# Patient Record
Sex: MALE | Race: WHITE | NOT HISPANIC OR LATINO
[De-identification: names, ages, dates, MRNs, and addresses within clinical notes are randomized per-mention and may not be internally consistent; named-entity substitution may affect disease eponyms.]

---

## 2017-01-04 ENCOUNTER — APPOINTMENT (OUTPATIENT)
Dept: UROLOGY | Facility: CLINIC | Age: 67
End: 2017-01-04

## 2017-01-04 ENCOUNTER — APPOINTMENT (OUTPATIENT)
Dept: ULTRASOUND IMAGING | Facility: IMAGING CENTER | Age: 67
End: 2017-01-04

## 2017-02-01 ENCOUNTER — APPOINTMENT (OUTPATIENT)
Dept: UROLOGY | Facility: CLINIC | Age: 67
End: 2017-02-01

## 2017-02-01 DIAGNOSIS — N20.0 CALCULUS OF KIDNEY: ICD-10-CM

## 2017-02-01 DIAGNOSIS — N40.1 BENIGN PROSTATIC HYPERPLASIA WITH LOWER URINARY TRACT SYMPMS: ICD-10-CM

## 2017-02-01 DIAGNOSIS — R33.9 RETENTION OF URINE, UNSPECIFIED: ICD-10-CM

## 2017-02-01 RX ORDER — SILDENAFIL 20 MG/1
20 TABLET ORAL 3 TIMES DAILY
Qty: 60 | Refills: 5 | Status: ACTIVE | COMMUNITY
Start: 2017-02-01 | End: 1900-01-01

## 2017-02-01 RX ORDER — FINASTERIDE 5 MG/1
5 TABLET, FILM COATED ORAL DAILY
Qty: 90 | Refills: 3 | Status: ACTIVE | COMMUNITY
Start: 2017-02-01 | End: 1900-01-01

## 2017-08-02 ENCOUNTER — APPOINTMENT (OUTPATIENT)
Dept: UROLOGY | Facility: CLINIC | Age: 67
End: 2017-08-02

## 2018-01-03 ENCOUNTER — RX RENEWAL (OUTPATIENT)
Age: 68
End: 2018-01-03

## 2018-01-03 RX ORDER — ALFUZOSIN HYDROCHLORIDE 10 MG/1
10 TABLET, EXTENDED RELEASE ORAL
Qty: 30 | Refills: 6 | Status: ACTIVE | COMMUNITY
Start: 2017-02-01 | End: 1900-01-01

## 2019-12-24 PROBLEM — E78.01 FAMILIAL HYPERCHOLESTEROLEMIA: Status: ACTIVE | Noted: 2019-12-24

## 2019-12-24 PROBLEM — N40.0 PROSTATIC HYPERTROPHY: Status: ACTIVE | Noted: 2019-12-24

## 2019-12-24 PROBLEM — Z76.89 ESTABLISHING CARE WITH NEW DOCTOR, ENCOUNTER FOR: Status: ACTIVE | Noted: 2019-12-24

## 2019-12-24 PROBLEM — J34.3 HYPERTROPHY OF NASAL TURBINATES: Status: ACTIVE | Noted: 2019-06-11

## 2019-12-24 PROBLEM — J34.2 DEVIATED NASAL SEPTUM: Status: ACTIVE | Noted: 2019-06-11

## 2019-12-24 PROBLEM — J32.9 CHRONIC SINUSITIS, UNSPECIFIED: Status: ACTIVE | Noted: 2019-06-11

## 2020-01-10 ENCOUNTER — OFFICE VISIT (OUTPATIENT)
Dept: FAMILY MEDICINE CLINIC | Facility: CLINIC | Age: 70
End: 2020-01-10
Payer: COMMERCIAL

## 2020-01-10 VITALS
DIASTOLIC BLOOD PRESSURE: 58 MMHG | OXYGEN SATURATION: 97 % | RESPIRATION RATE: 14 BRPM | WEIGHT: 158.6 LBS | BODY MASS INDEX: 25.49 KG/M2 | TEMPERATURE: 98.2 F | HEIGHT: 66 IN | SYSTOLIC BLOOD PRESSURE: 100 MMHG | HEART RATE: 89 BPM

## 2020-01-10 DIAGNOSIS — Z76.89 ESTABLISHING CARE WITH NEW DOCTOR, ENCOUNTER FOR: Primary | ICD-10-CM

## 2020-01-10 DIAGNOSIS — R53.83 FATIGUE, UNSPECIFIED TYPE: ICD-10-CM

## 2020-01-10 DIAGNOSIS — J31.0 CHRONIC RHINITIS: ICD-10-CM

## 2020-01-10 DIAGNOSIS — G47.33 OBSTRUCTIVE SLEEP APNEA SYNDROME: ICD-10-CM

## 2020-01-10 DIAGNOSIS — I71.2 THORACIC AORTIC ANEURYSM WITHOUT RUPTURE (HCC): ICD-10-CM

## 2020-01-10 DIAGNOSIS — E78.01 FAMILIAL HYPERCHOLESTEROLEMIA: ICD-10-CM

## 2020-01-10 DIAGNOSIS — F34.1 DYSTHYMIA: ICD-10-CM

## 2020-01-10 DIAGNOSIS — Z12.5 ENCOUNTER FOR PROSTATE CANCER SCREENING: ICD-10-CM

## 2020-01-10 DIAGNOSIS — M15.9 PRIMARY OSTEOARTHRITIS INVOLVING MULTIPLE JOINTS: ICD-10-CM

## 2020-01-10 PROBLEM — T84.498A FAILED ORTHOPEDIC IMPLANT (HCC): Status: ACTIVE | Noted: 2017-11-22

## 2020-01-10 PROBLEM — I71.20 THORACIC AORTIC ANEURYSM WITHOUT RUPTURE: Status: ACTIVE | Noted: 2020-01-10

## 2020-01-10 PROCEDURE — 99203 OFFICE O/P NEW LOW 30 MIN: CPT | Performed by: FAMILY MEDICINE

## 2020-01-10 PROCEDURE — 36415 COLL VENOUS BLD VENIPUNCTURE: CPT | Performed by: FAMILY MEDICINE

## 2020-01-10 PROCEDURE — 3008F BODY MASS INDEX DOCD: CPT | Performed by: FAMILY MEDICINE

## 2020-01-10 PROCEDURE — 1160F RVW MEDS BY RX/DR IN RCRD: CPT | Performed by: FAMILY MEDICINE

## 2020-01-10 RX ORDER — COVID-19 ANTIGEN TEST
KIT MISCELLANEOUS AS NEEDED
COMMUNITY

## 2020-01-10 RX ORDER — SILDENAFIL CITRATE 20 MG/1
20 TABLET ORAL AS NEEDED
COMMUNITY
Start: 2016-06-17 | End: 2021-08-03

## 2020-01-10 RX ORDER — HYDROXYZINE HYDROCHLORIDE 10 MG/1
10 TABLET, FILM COATED ORAL DAILY PRN
COMMUNITY

## 2020-01-10 RX ORDER — PYRIDOXINE HCL (VITAMIN B6) 50 MG
2000 TABLET ORAL WEEKLY
COMMUNITY

## 2020-01-10 RX ORDER — CELECOXIB 200 MG/1
CAPSULE ORAL
Refills: 0 | COMMUNITY
Start: 2019-12-10 | End: 2021-08-03

## 2020-01-10 RX ORDER — HYDROCODONE BITARTRATE AND ACETAMINOPHEN 5; 300 MG/1; MG/1
TABLET ORAL
Refills: 0 | COMMUNITY
Start: 2019-10-11 | End: 2021-08-03 | Stop reason: SDUPTHER

## 2020-01-10 RX ORDER — TRAMADOL HYDROCHLORIDE 50 MG/1
TABLET ORAL
Refills: 0 | COMMUNITY
Start: 2019-10-11 | End: 2022-02-16 | Stop reason: SDUPTHER

## 2020-01-10 NOTE — PROGRESS NOTES
BMI Counseling: Body mass index is 25 6 kg/m²  The BMI is above normal  Nutrition recommendations include encouraging healthy choices of fruits and vegetables and moderation in carbohydrate intake  Exercise recommendations include exercising 3-5 times per week  No pharmacotherapy was ordered  Assessment/Plan:    No problem-specific Assessment & Plan notes found for this encounter  Diagnoses and all orders for this visit:    Establishing care with new doctor, encounter for    Familial hypercholesterolemia  -     Comprehensive metabolic panel  -     Lipid panel    Primary osteoarthritis involving multiple joints    Obstructive sleep apnea syndrome  -     CBC and differential    Chronic rhinitis    Thoracic aortic aneurysm without rupture (HCC)    Fatigue, unspecified type  -     TSH, 3rd generation  -     CBC and differential  -     Comprehensive metabolic panel  -     Lipid panel  -     PSA Total (Reflex To Free)  -     Sedimentation rate, automated  -     C-reactive protein  -     Lyme disease, western blot    Encounter for prostate cancer screening  -     PSA Total (Reflex To Free)    Dysthymia    Other orders  -     traMADol (ULTRAM) 50 mg tablet; TK 1 T PO UP TO TID QD PRN  -     celecoxib (CeleBREX) 200 mg capsule; TK 1 C PO QD PRN  -     sildenafil (REVATIO) 20 mg tablet; Take 20 mg by mouth  -     hydrOXYzine HCL (ATARAX) 25 mg tablet; Take 25 mg by mouth  -     HYDROcodone-acetaminophen (XODOL) 5-300 MG per tablet; TK 1 T PO Q 6 H PRN  -     Naproxen Sodium (ALEVE) 220 MG CAPS; Take by mouth  -     vitamin B-12 (CYANOCOBALAMIN) 100 MCG TABS; Take 1,000 mcg by mouth daily  -     IRON, FERROUS SULFATE, PO; Take by mouth daily  -     Pseudoephedrine-guaiFENesin (MUCINEX D PO); Take by mouth as needed  -     Pseudoephedrine HCl (SUDAFED PO);  Take by mouth as needed          Patient Instructions   CONTINUE CURRENT TREATMENT PLAN AND MEDICATIONS  BW WILL BE OBTAINED    WILL SCHEDULE A CPX IN THE NEAR FUTURE    FURTHER PLANS PENDING EVALUATION        Return in about 4 weeks (around 2/7/2020) for Annual physical     Subjective:      Patient ID: Austin Winston is a 68 y o  male  Chief Complaint   Patient presents with   [facility]       PATIENT IS A NEW PATIENT TO THE PRACTICE    REVIEWED EXTENSIVE PAST MEDICAL HISTORY AND MEDICATIONS  DISCUSSED HEALTH MAINTENANCE ISSUES    PATIENT HAS BEEN UNDER A LOT OF STRESS  REVIEWED THERAPEUTIC OPTIONS      The following portions of the patient's history were reviewed and updated as appropriate: allergies, current medications, past family history, past medical history, past social history, past surgical history and problem list     Review of Systems   Constitutional: Positive for fatigue  Negative for chills and fever  HENT: Negative for congestion, ear discharge, ear pain, mouth sores, postnasal drip, sore throat and trouble swallowing  Eyes: Negative for pain, discharge and visual disturbance  Respiratory: Negative for cough, shortness of breath and wheezing  Cardiovascular: Negative for chest pain, palpitations and leg swelling  Gastrointestinal: Negative for abdominal distention, abdominal pain, blood in stool, diarrhea and nausea  Endocrine: Negative for polydipsia, polyphagia and polyuria  Genitourinary: Negative for dysuria, frequency, hematuria and urgency  Musculoskeletal: Negative for arthralgias, gait problem and joint swelling  Skin: Negative for pallor and rash  Neurological: Negative for dizziness, syncope, speech difficulty, weakness, light-headedness, numbness and headaches  Hematological: Negative for adenopathy  Psychiatric/Behavioral: Positive for dysphoric mood  Negative for behavioral problems, confusion and sleep disturbance  The patient is nervous/anxious            Current Outpatient Medications   Medication Sig Dispense Refill    alfuzosin (UROXATRAL) 10 mg 24 hr tablet Take 10 mg by mouth daily      aspirin 325 mg tablet Take 325 mg by mouth      atorvastatin (LIPITOR) 20 mg tablet Take 20 mg by mouth daily      celecoxib (CeleBREX) 200 mg capsule TK 1 C PO QD PRN  0    econazole nitrate 1 % cream Apply 1 application topically      finasteride (PROSCAR) 5 mg tablet Take 5 mg by mouth      HYDROcodone-acetaminophen (XODOL) 5-300 MG per tablet TK 1 T PO Q 6 H PRN  0    hydrOXYzine HCL (ATARAX) 25 mg tablet Take 25 mg by mouth      IRON, FERROUS SULFATE, PO Take by mouth daily      metaxalone (SKELAXIN) 800 mg tablet Take 800 mg by mouth       mometasone (NASONEX) 50 mcg/act nasal spray 50 mcg into each nostril daily      Naproxen Sodium (ALEVE) 220 MG CAPS Take by mouth      Pseudoephedrine HCl (SUDAFED PO) Take by mouth as needed      Pseudoephedrine-guaiFENesin (MUCINEX D PO) Take by mouth as needed      sildenafil (REVATIO) 20 mg tablet Take 20 mg by mouth      traMADol (ULTRAM) 50 mg tablet TK 1 T PO UP TO TID QD PRN  0    vitamin B-12 (CYANOCOBALAMIN) 100 MCG TABS Take 1,000 mcg by mouth daily      zolpidem (AMBIEN) 10 mg tablet Take 10 mg by mouth 1/2 to 1 PRN       No current facility-administered medications for this visit  Objective:    /58   Pulse 89   Temp 98 2 °F (36 8 °C) (Temporal)   Resp 14   Ht 5' 6" (1 676 m)   Wt 71 9 kg (158 lb 9 6 oz)   SpO2 97%   BMI 25 60 kg/m²        Physical Exam   Constitutional: He is oriented to person, place, and time  He appears well-developed and well-nourished  HENT:   Head: Normocephalic and atraumatic  Eyes: Pupils are equal, round, and reactive to light  Conjunctivae and EOM are normal  Right eye exhibits no discharge  Left eye exhibits no discharge  Neck: Neck supple  No JVD present  No thyromegaly present  Cardiovascular: Normal rate, regular rhythm and normal heart sounds  No murmur heard  Pulmonary/Chest: Effort normal and breath sounds normal  He has no wheezes  He has no rales  Abdominal: Soft   Bowel sounds are normal  He exhibits no mass  There is no hepatosplenomegaly  There is no tenderness  There is no rebound, no guarding and no CVA tenderness  Musculoskeletal: Normal range of motion  He exhibits no edema, tenderness or deformity  Lymphadenopathy:     He has no cervical adenopathy  He has no axillary adenopathy  Neurological: He is alert and oriented to person, place, and time  Skin: Skin is warm and dry  No rash noted  No erythema  Psychiatric: He has a normal mood and affect  His behavior is normal  Judgment and thought content normal        Depression Screening Follow-up Plan: Patient's depression screening was positive with a PHQ-2 score of 3  Their PHQ-9 score was 14  Patient assessed for underlying major depression  They have no active suicidal ideations  Brief counseling provided and recommend additional follow-up/re-evaluation next office visit           Joe Duran MD

## 2020-01-10 NOTE — LETTER
[facility] [address]    Patient Active Problem List   Diagnosis    Chronic rhinitis    Deviated nasal septum    Hypertrophy of nasal turbinates    Familial hypercholesterolemia    Prostatic hypertrophy    Establishing care with new doctor, encounter for    Allergic urticaria    Eczema of both external ears    Failed orthopedic implant ([facility] [address] )    LPRD (laryngopharyngeal reflux disease)    Osteoarthritis       Current Outpatient Medications on File Prior to Visit   Medication Sig Dispense Refill    alfuzosin (UROXATRAL) 10 mg 24 hr tablet Take 10 mg by mouth daily      aspirin 325 mg tablet Take 325 mg by mouth      atorvastatin (LIPITOR) 20 mg tablet Take 20 mg by mouth daily      econazole nitrate 1 % cream Apply 1 application topically      finasteride (PROSCAR) 5 mg tablet Take 5 mg by mouth      metaxalone (SKELAXIN) 400 MG tablet Take by mouth      mometasone (NASONEX) 50 mcg/act nasal spray 50 mcg into each nostril daily      zolpidem (AMBIEN) 10 mg tablet Take 10 mg by mouth       No current facility-administered medications on file prior to visit  No past medical history on file

## 2020-01-10 NOTE — PATIENT INSTRUCTIONS
CONTINUE CURRENT TREATMENT PLAN AND MEDICATIONS  BW WILL BE OBTAINED    WILL SCHEDULE A CPX IN THE NEAR FUTURE    FURTHER PLANS PENDING EVALUATION

## 2020-01-14 LAB
ALBUMIN SERPL-MCNC: 4.3 G/DL (ref 3.6–5.1)
ALBUMIN/GLOB SERPL: 1.7 (CALC) (ref 1–2.5)
ALP SERPL-CCNC: 98 U/L (ref 40–115)
ALT SERPL-CCNC: 10 U/L (ref 9–46)
AST SERPL-CCNC: 14 U/L (ref 10–35)
B BURGDOR IGG SER QL IB: NEGATIVE
B BURGDOR IGM SER QL IB: NEGATIVE
B BURGDOR18KD IGG SER QL IB: ABNORMAL
B BURGDOR23KD IGG SER QL IB: ABNORMAL
B BURGDOR23KD IGM SER QL IB: ABNORMAL
B BURGDOR28KD IGG SER QL IB: ABNORMAL
B BURGDOR30KD IGG SER QL IB: ABNORMAL
B BURGDOR39KD IGG SER QL IB: ABNORMAL
B BURGDOR39KD IGM SER QL IB: ABNORMAL
B BURGDOR41KD IGG SER QL IB: ABNORMAL
B BURGDOR41KD IGM SER QL IB: REACTIVE
B BURGDOR45KD IGG SER QL IB: ABNORMAL
B BURGDOR58KD IGG SER QL IB: ABNORMAL
B BURGDOR66KD IGG SER QL IB: ABNORMAL
B BURGDOR93KD IGG SER QL IB: ABNORMAL
BASOPHILS # BLD AUTO: 62 CELLS/UL (ref 0–200)
BASOPHILS NFR BLD AUTO: 1.1 %
BILIRUB SERPL-MCNC: 0.5 MG/DL (ref 0.2–1.2)
BUN SERPL-MCNC: 20 MG/DL (ref 7–25)
BUN/CREAT SERPL: NORMAL (CALC) (ref 6–22)
CALCIUM SERPL-MCNC: 10.2 MG/DL (ref 8.6–10.3)
CHLORIDE SERPL-SCNC: 106 MMOL/L (ref 98–110)
CHOLEST SERPL-MCNC: 158 MG/DL
CHOLEST/HDLC SERPL: 2.4 (CALC)
CO2 SERPL-SCNC: 27 MMOL/L (ref 20–32)
CREAT SERPL-MCNC: 0.97 MG/DL (ref 0.7–1.18)
CRP SERPL-MCNC: 25.8 MG/L
EOSINOPHIL # BLD AUTO: 202 CELLS/UL (ref 15–500)
EOSINOPHIL NFR BLD AUTO: 3.6 %
ERYTHROCYTE [DISTWIDTH] IN BLOOD BY AUTOMATED COUNT: 16.6 % (ref 11–15)
ERYTHROCYTE [SEDIMENTATION RATE] IN BLOOD BY WESTERGREN METHOD: 2 MM/H
GLOBULIN SER CALC-MCNC: 2.5 G/DL (CALC) (ref 1.9–3.7)
GLUCOSE SERPL-MCNC: 77 MG/DL (ref 65–99)
HCT VFR BLD AUTO: 44.7 % (ref 38.5–50)
HDLC SERPL-MCNC: 67 MG/DL
HGB BLD-MCNC: 14 G/DL (ref 13.2–17.1)
LDLC SERPL CALC-MCNC: 75 MG/DL (CALC)
LYMPHOCYTES # BLD AUTO: 958 CELLS/UL (ref 850–3900)
LYMPHOCYTES NFR BLD AUTO: 17.1 %
MCH RBC QN AUTO: 26.1 PG (ref 27–33)
MCHC RBC AUTO-ENTMCNC: 31.3 G/DL (ref 32–36)
MCV RBC AUTO: 83.4 FL (ref 80–100)
MONOCYTES # BLD AUTO: 448 CELLS/UL (ref 200–950)
MONOCYTES NFR BLD AUTO: 8 %
NEUTROPHILS # BLD AUTO: 3931 CELLS/UL (ref 1500–7800)
NEUTROPHILS NFR BLD AUTO: 70.2 %
NONHDLC SERPL-MCNC: 91 MG/DL (CALC)
PLATELET # BLD AUTO: 212 THOUSAND/UL (ref 140–400)
PMV BLD REES-ECKER: 11.2 FL (ref 7.5–12.5)
POTASSIUM SERPL-SCNC: 4.5 MMOL/L (ref 3.5–5.3)
PROT SERPL-MCNC: 6.8 G/DL (ref 6.1–8.1)
PSA SERPL-MCNC: 1.3 NG/ML
RBC # BLD AUTO: 5.36 MILLION/UL (ref 4.2–5.8)
SL AMB EGFR AFRICAN AMERICAN: 91 ML/MIN/1.73M2
SL AMB EGFR NON AFRICAN AMERICAN: 78 ML/MIN/1.73M2
SODIUM SERPL-SCNC: 143 MMOL/L (ref 135–146)
TRIGL SERPL-MCNC: 79 MG/DL
TSH SERPL-ACNC: 1.92 MIU/L (ref 0.4–4.5)
WBC # BLD AUTO: 5.6 THOUSAND/UL (ref 3.8–10.8)

## 2020-02-06 PROBLEM — Z76.89 ESTABLISHING CARE WITH NEW DOCTOR, ENCOUNTER FOR: Status: RESOLVED | Noted: 2019-12-24 | Resolved: 2020-02-06

## 2020-02-06 NOTE — PATIENT INSTRUCTIONS
DISCUSSED HEALTH MAINTENENCE ISSUES  BW WILL BE OBTAINED  ENCOURAGED HEALTHY DIET AND EXERCISE  COLONOSCOPY WILL BE ORDERED  RV FOR ANNUAL HEALTH EXAM IN 1 YEAR  RV SOONER IF THERE ARE ANY CONCERNS      Recent Results (from the past 6048 hour(s))   TSH, 3rd generation    Collection Time: 01/10/20  2:02 PM   Result Value Ref Range    TSH 1 92 0 40 - 4 50 mIU/L   CBC and differential    Collection Time: 01/10/20  2:02 PM   Result Value Ref Range    White Blood Cell Count 5 6 3 8 - 10 8 Thousand/uL    Red Blood Cell Count 5 36 4 20 - 5 80 Million/uL    Hemoglobin 14 0 13 2 - 17 1 g/dL    HCT 44 7 38 5 - 50 0 %    MCV 83 4 80 0 - 100 0 fL    MCH 26 1 (L) 27 0 - 33 0 pg    MCHC 31 3 (L) 32 0 - 36 0 g/dL    RDW 16 6 (H) 11 0 - 15 0 %    Platelet Count 635 449 - 400 Thousand/uL    SL AMB MPV 11 2 7 5 - 12 5 fL    Neutrophils (Absolute) 3,931 1,500 - 7,800 cells/uL    Lymphocytes (Absolute) 958 850 - 3,900 cells/uL    Monocytes (Absolute) 448 200 - 950 cells/uL    Eosinophils (Absolute) 202 15 - 500 cells/uL    Basophils ABS 62 0 - 200 cells/uL    Neutrophils 70 2 %    Lymphocytes 17 1 %    Monocytes 8 0 %    Eosinophils 3 6 %    Basophils PCT 1 1 %   Comprehensive metabolic panel    Collection Time: 01/10/20  2:02 PM   Result Value Ref Range    Glucose, Random 77 65 - 99 mg/dL    BUN 20 7 - 25 mg/dL    Creatinine 0 97 0 70 - 1 18 mg/dL    eGFR Non  78 > OR = 60 mL/min/1 73m2    eGFR  91 > OR = 60 mL/min/1 73m2    SL AMB BUN/CREATININE RATIO NOT APPLICABLE 6 - 22 (calc)    Sodium 143 135 - 146 mmol/L    Potassium 4 5 3 5 - 5 3 mmol/L    Chloride 106 98 - 110 mmol/L    CO2 27 20 - 32 mmol/L    SL AMB CALCIUM 10 2 8 6 - 10 3 mg/dL    Protein, Total 6 8 6 1 - 8 1 g/dL    Albumin 4 3 3 6 - 5 1 g/dL    Globulin 2 5 1 9 - 3 7 g/dL (calc)    Albumin/Globulin Ratio 1 7 1 0 - 2 5 (calc)    TOTAL BILIRUBIN 0 5 0 2 - 1 2 mg/dL    Alkaline Phosphatase 98 40 - 115 U/L    AST 14 10 - 35 U/L    ALT 10 9 - 46 U/L   Lipid panel    Collection Time: 01/10/20  2:02 PM   Result Value Ref Range    Total Cholesterol 158 <200 mg/dL    HDL 67 >40 mg/dL    Triglycerides 79 <150 mg/dL    LDL Direct 75 mg/dL (calc)    Chol HDLC Ratio 2 4 <5 0 (calc)    Non-HDL Cholesterol 91 <130 mg/dL (calc)   PSA Total (Reflex To Free)    Collection Time: 01/10/20  2:02 PM   Result Value Ref Range    Prostate Specific Antigen Total 1 3 < OR = 4 0 ng/mL   Sedimentation rate, automated    Collection Time: 01/10/20  2:02 PM   Result Value Ref Range    Sedimentation Rate 2 < OR = 20 mm/h   C-reactive protein    Collection Time: 01/10/20  2:02 PM   Result Value Ref Range    C-Reactive Protein, Quant 25 8 (H) <8 0 mg/L   Lyme disease, western blot    Collection Time: 01/10/20  2:02 PM   Result Value Ref Range    Lyme Disease Ab (IgG), Blot NEGATIVE NEGATIVE    Lyme 18 kD IgG NON-REACTIVE     Lyme 23 kD IgG NON-REACTIVE     Lyme 28 kD IgG NON-REACTIVE     Lyme 30 kD IgG NON-REACTIVE     Lyme 39 kD IgG NON-REACTIVE     Lyme 41 kD IgG NON-REACTIVE     Lyme 45 kD IgG NON-REACTIVE     Lyme 58 kD IgG NON-REACTIVE     Lyme 66 kD IgG NON-REACTIVE     Lyme 93 kD IgG NON-REACTIVE     Lyme Disease Ab (IgM), Blot NEGATIVE NEGATIVE    Lyme 23 kD IgM NON-REACTIVE     Lyme 39 kD IgM NON-REACTIVE     Lyme 41 kD IgM REACTIVE (A)

## 2020-02-07 ENCOUNTER — OFFICE VISIT (OUTPATIENT)
Dept: FAMILY MEDICINE CLINIC | Facility: CLINIC | Age: 70
End: 2020-02-07
Payer: COMMERCIAL

## 2020-02-07 VITALS
RESPIRATION RATE: 16 BRPM | SYSTOLIC BLOOD PRESSURE: 120 MMHG | BODY MASS INDEX: 24.61 KG/M2 | OXYGEN SATURATION: 97 % | WEIGHT: 156.8 LBS | DIASTOLIC BLOOD PRESSURE: 84 MMHG | HEIGHT: 67 IN | TEMPERATURE: 97.4 F | HEART RATE: 89 BPM

## 2020-02-07 DIAGNOSIS — E78.01 FAMILIAL HYPERCHOLESTEROLEMIA: ICD-10-CM

## 2020-02-07 DIAGNOSIS — J31.0 CHRONIC RHINITIS: ICD-10-CM

## 2020-02-07 DIAGNOSIS — M15.9 PRIMARY OSTEOARTHRITIS INVOLVING MULTIPLE JOINTS: ICD-10-CM

## 2020-02-07 DIAGNOSIS — G47.33 OBSTRUCTIVE SLEEP APNEA SYNDROME: ICD-10-CM

## 2020-02-07 DIAGNOSIS — F34.1 DYSTHYMIA: ICD-10-CM

## 2020-02-07 DIAGNOSIS — I71.2 THORACIC AORTIC ANEURYSM WITHOUT RUPTURE (HCC): ICD-10-CM

## 2020-02-07 DIAGNOSIS — N40.0 PROSTATIC HYPERTROPHY: ICD-10-CM

## 2020-02-07 DIAGNOSIS — Z12.11 COLON CANCER SCREENING: ICD-10-CM

## 2020-02-07 DIAGNOSIS — Z00.00 ROUTINE GENERAL MEDICAL EXAMINATION AT A HEALTH CARE FACILITY: Primary | ICD-10-CM

## 2020-02-07 DIAGNOSIS — K21.9 LPRD (LARYNGOPHARYNGEAL REFLUX DISEASE): ICD-10-CM

## 2020-02-07 LAB
SL AMB  POCT GLUCOSE, UA: 0
SL AMB LEUKOCYTE ESTERASE,UA: 0
SL AMB POCT BILIRUBIN,UA: 1
SL AMB POCT BLOOD,UA: 0
SL AMB POCT CLARITY,UA: CLEAR
SL AMB POCT COLOR,UA: YELLOW
SL AMB POCT FECES OCC BLD: NORMAL
SL AMB POCT KETONES,UA: 0
SL AMB POCT NITRITE,UA: 0
SL AMB POCT PH,UA: 5
SL AMB POCT SPECIFIC GRAVITY,UA: 1.03
SL AMB POCT URINE PROTEIN: 0
SL AMB POCT UROBILINOGEN: 1

## 2020-02-07 PROCEDURE — 99397 PER PM REEVAL EST PAT 65+ YR: CPT | Performed by: FAMILY MEDICINE

## 2020-02-07 PROCEDURE — 93000 ELECTROCARDIOGRAM COMPLETE: CPT | Performed by: FAMILY MEDICINE

## 2020-02-07 PROCEDURE — 3288F FALL RISK ASSESSMENT DOCD: CPT | Performed by: FAMILY MEDICINE

## 2020-02-07 PROCEDURE — 81003 URINALYSIS AUTO W/O SCOPE: CPT | Performed by: FAMILY MEDICINE

## 2020-02-07 PROCEDURE — 82270 OCCULT BLOOD FECES: CPT | Performed by: FAMILY MEDICINE

## 2020-02-07 PROCEDURE — 1101F PT FALLS ASSESS-DOCD LE1/YR: CPT | Performed by: FAMILY MEDICINE

## 2020-02-07 NOTE — PROGRESS NOTES
BMI Counseling: Body mass index is 24 56 kg/m²  The BMI is above normal  Nutrition recommendations include encouraging healthy choices of fruits and vegetables and moderation in carbohydrate intake  Exercise recommendations include exercising 3-5 times per week  No pharmacotherapy was ordered  FAMILY PRACTICE HEALTH MAINTENANCE OFFICE VISIT  [facility] [place] [address] PHYSICIANS    NAME: Phyllis Moya  AGE: 68 y o  SEX: male  : 1950     DATE: 2020    Assessment and Plan     Problem List Items Addressed This Visit        Digestive    LPRD (laryngopharyngeal reflux disease)       Respiratory    Chronic rhinitis    Obstructive sleep apnea syndrome       Cardiovascular and Mediastinum    Thoracic aortic aneurysm without rupture ([facility] [address] )    Relevant Orders    POCT urine dip auto non-scope (Completed)    POCT ECG (Completed)       Musculoskeletal and Integument    Osteoarthritis       Other    Familial hypercholesterolemia    Prostatic hypertrophy    Dysthymia      Other Visit Diagnoses     Routine general medical examination at a health care facility    -  Primary    Colon cancer screening        Relevant Orders    POCT hemoccult screening               Return in about 6 months (around 2020) for Recheck          Chief Complaint     Chief Complaint   Patient presents with    Annual Exam       History of Present Illness     [address] RECORD  NO CONCERNS AT THIS TIME        Well Adult Physical   Patient here for a comprehensive physical exam       Diet and Physical Activity  Diet: well balanced diet  Weight concerns: None, patient's BMI is between 18 5-24 9  Exercise: frequently      Depression Screen  PHQ-9 Depression Screening    PHQ-9:    Frequency of the following problems over the past two weeks:               General Health  Hearing: Normal:  bilateral  Vision: no vision problems  Dental: regular dental visits    Reproductive Health          The following portions of the patient's history were reviewed and updated as appropriate: allergies, current medications, past family history, past medical history, past social history, past surgical history and problem list     Review of Systems     Review of Systems   Constitutional: Positive for fatigue  Negative for chills and fever  HENT: Negative for congestion, ear discharge, ear pain, mouth sores, postnasal drip, sore throat and trouble swallowing  Eyes: Negative for pain, discharge and visual disturbance  Respiratory: Negative for cough, shortness of breath and wheezing  Cardiovascular: Negative for chest pain, palpitations and leg swelling  Gastrointestinal: Negative for abdominal distention, abdominal pain, blood in stool, diarrhea and nausea  Endocrine: Negative for polydipsia, polyphagia and polyuria  Genitourinary: Negative for dysuria, frequency, hematuria and urgency  Musculoskeletal: Positive for arthralgias, back pain and neck pain  Negative for gait problem and joint swelling  Skin: Negative for pallor and rash  Neurological: Negative for dizziness, syncope, speech difficulty, weakness, light-headedness, numbness and headaches  Hematological: Negative for adenopathy  Psychiatric/Behavioral: Negative for behavioral problems, confusion and sleep disturbance  The patient is not nervous/anxious  Past Medical History     History reviewed  No pertinent past medical history      Past Surgical History     Past Surgical History:   Procedure Laterality Date    HIP SURGERY Left     NISHA    HIP SURGERY Right     revision    KIDNEY STONE SURGERY      KNEE SURGERY Right     KNEE SURGERY Right     ligament    KNEE SURGERY Right     ligament    NASAL SINUS SURGERY      TONSILECTOMY AND ADNOIDECTOMY      TOTAL HIP ARTHROPLASTY Right        Social History     Social History     Socioeconomic History    Marital status: /Civil Union     Spouse name: None  Number of children: None    Years of education: None    Highest education level: None   Occupational History    None   Social Needs    Financial resource strain: None    Food insecurity:     Worry: None     Inability: None    Transportation needs:     Medical: None     Non-medical: None   Tobacco Use    Smoking status: Never Smoker    Smokeless tobacco: Never Used   Substance and Sexual Activity    Alcohol use: Yes     Frequency: Monthly or less    Drug use: Not Currently    Sexual activity: Yes     Partners: Female     Birth control/protection: None   Lifestyle    Physical activity:     Days per week: None     Minutes per session: None    Stress: None   Relationships    Social connections:     Talks on phone: None     Gets together: None     Attends Sabianist service: None     Active member of club or organization: None     Attends meetings of clubs or organizations: None     Relationship status: None    Intimate partner violence:     Fear of current or ex partner: None     Emotionally abused: None     Physically abused: None     Forced sexual activity: None   Other Topics Concern    None   Social History Narrative    None       Family History     Family History   Problem Relation Age of Onset    Cancer Mother     Cancer Father     Mental illness Neg Hx     Substance Abuse Neg Hx        Current Medications       Current Outpatient Medications:     alfuzosin (UROXATRAL) 10 mg 24 hr tablet, Take 10 mg by mouth daily, Disp: , Rfl:     aspirin 325 mg tablet, Take 325 mg by mouth, Disp: , Rfl:     atorvastatin (LIPITOR) 20 mg tablet, Take 20 mg by mouth daily, Disp: , Rfl:     celecoxib (CeleBREX) 200 mg capsule, TK 1 C PO QD PRN, Disp: , Rfl: 0    econazole nitrate 1 % cream, Apply 1 application topically, Disp: , Rfl:     finasteride (PROSCAR) 5 mg tablet, Take 5 mg by mouth, Disp: , Rfl:     HYDROcodone-acetaminophen (XODOL) 5-300 MG per tablet, TK 1 T PO Q 6 H PRN, Disp: , Rfl: 0   hydrOXYzine HCL (ATARAX) 10 mg tablet, Take 10 mg by mouth , Disp: , Rfl:     IRON, FERROUS SULFATE, PO, Take by mouth daily, Disp: , Rfl:     metaxalone (SKELAXIN) 800 mg tablet, Take 800 mg by mouth as needed , Disp: , Rfl:     mometasone (NASONEX) 50 mcg/act nasal spray, 50 mcg into each nostril as needed , Disp: , Rfl:     Naproxen Sodium (ALEVE) 220 MG CAPS, Take by mouth, Disp: , Rfl:     Pseudoephedrine HCl (SUDAFED PO), Take by mouth as needed, Disp: , Rfl:     Pseudoephedrine-guaiFENesin (MUCINEX D PO), Take by mouth as needed, Disp: , Rfl:     sildenafil (REVATIO) 20 mg tablet, Take 20 mg by mouth, Disp: , Rfl:     traMADol (ULTRAM) 50 mg tablet, TK 1 T PO UP TO TID QD PRN, Disp: , Rfl: 0    vitamin B-12 (CYANOCOBALAMIN) 100 MCG TABS, Take 1,000 mcg by mouth daily, Disp: , Rfl:     zolpidem (AMBIEN) 10 mg tablet, Take 10 mg by mouth 1/2 to 1 PRN, Disp: , Rfl:      Allergies     Allergies   Allergen Reactions    Meloxicam Other (See Comments)     suicidial / itching     Medical Tape Itching       Objective     /84   Pulse 89   Temp (!) 97 4 °F (36 3 °C) (Temporal)   Resp 16   Ht 5' 7" (1 702 m)   Wt 71 1 kg (156 lb 12 8 oz)   SpO2 97%   BMI 24 56 kg/m²      Physical Exam   Constitutional: He is oriented to person, place, and time  He appears well-developed and well-nourished  HENT:   Head: Normocephalic and atraumatic  Right Ear: External ear normal    Left Ear: External ear normal    Nose: Nose normal    Mouth/Throat: Oropharynx is clear and moist    Eyes: Pupils are equal, round, and reactive to light  Conjunctivae and EOM are normal  Right eye exhibits no discharge  Left eye exhibits no discharge  Neck: Neck supple  No JVD present  No thyromegaly present  Cardiovascular: Normal rate, regular rhythm, normal heart sounds and intact distal pulses  No murmur heard  Pulmonary/Chest: Effort normal and breath sounds normal  He has no wheezes  He has no rales     Abdominal: Soft  Bowel sounds are normal  He exhibits no mass  There is no hepatosplenomegaly  There is no tenderness  There is no rebound, no guarding and no CVA tenderness  Genitourinary: Rectum normal, prostate normal and penis normal  Rectal exam shows guaiac negative stool  Musculoskeletal: Normal range of motion  He exhibits no edema, tenderness or deformity  MOD DJD CHANGES     Lymphadenopathy:     He has no cervical adenopathy  He has no axillary adenopathy  Neurological: He is alert and oriented to person, place, and time  He has normal reflexes  No cranial nerve deficit  He exhibits normal muscle tone  Coordination normal    Skin: Skin is warm and dry  No rash noted  No erythema  Psychiatric: He has a normal mood and affect   His behavior is normal  Judgment and thought content normal          No exam data present    Health Maintenance     Health Maintenance   Topic Date Due    Hepatitis C Screening  1950    CRC Screening: Colonoscopy  1950    DTaP,Tdap,and Td Vaccines (1 - Tdap) 05/11/1959    Annual Physical  05/11/1966    Pneumococcal Vaccine: 65+ Years (2 of 2 - PPSV23) 12/18/2016    Influenza Vaccine  03/05/2020 (Originally 7/1/2019)    BMI: Adult  01/10/2021    Fall Risk  02/07/2021    Pneumococcal Vaccine: Pediatrics (0 to 5 Years) and At-Risk Patients (6 to 59 Years)  Aged Out    HIB Vaccine  Aged Out    Hepatitis B Vaccine  Aged Out    IPV Vaccine  Aged Out    Hepatitis A Vaccine  Aged Out    Meningococcal ACWY Vaccine  Aged Out    HPV Vaccine  Aged [employer] History   Administered Date(s) Administered    Pneumococcal Conjugate 13-Valent 12/18/2015       Channing Quintero MD  [facility]

## 2020-02-07 NOTE — LETTER
[address] [facility] [address]    Recent Results (from the past 6048 hour(s))   TSH, 3rd generation    Collection Time: 01/10/20  2:02 PM   Result Value Ref Range    TSH 1 92 0 40 - 4 50 mIU/L   CBC and differential    Collection Time: 01/10/20  2:02 PM   Result Value Ref Range    White Blood Cell Count 5 6 3 8 - 10 8 Thousand/uL    Red Blood Cell Count 5 36 4 20 - 5 80 Million/uL    Hemoglobin 14 0 13 2 - 17 1 g/dL    HCT 44 7 38 5 - 50 0 %    MCV 83 4 80 0 - 100 0 fL    MCH 26 1 (L) 27 0 - 33 0 pg    MCHC 31 3 (L) 32 0 - 36 0 g/dL    RDW 16 6 (H) 11 0 - 15 0 %    Platelet Count 868 265 - 400 Thousand/uL    SL AMB MPV 11 2 7 5 - 12 5 fL    Neutrophils (Absolute) 3,931 1,500 - 7,800 cells/uL    Lymphocytes (Absolute) 958 850 - 3,900 cells/uL    Monocytes (Absolute) 448 200 - 950 cells/uL    Eosinophils (Absolute) 202 15 - 500 cells/uL    Basophils ABS 62 0 - 200 cells/uL    Neutrophils 70 2 %    Lymphocytes 17 1 %    Monocytes 8 0 %    Eosinophils 3 6 %    Basophils PCT 1 1 %   Comprehensive metabolic panel    Collection Time: 01/10/20  2:02 PM   Result Value Ref Range    Glucose, Random 77 65 - 99 mg/dL    BUN 20 7 - 25 mg/dL    Creatinine 0 97 0 70 - 1 18 mg/dL    eGFR Non  78 > OR = 60 mL/min/1 73m2    eGFR  91 > OR = 60 mL/min/1 73m2    SL AMB BUN/CREATININE RATIO NOT APPLICABLE 6 - 22 (calc)    Sodium 143 135 - 146 mmol/L    Potassium 4 5 3 5 - 5 3 mmol/L    Chloride 106 98 - 110 mmol/L    CO2 27 20 - 32 mmol/L    SL AMB CALCIUM 10 2 8 6 - 10 3 mg/dL    Protein, Total 6 8 6 1 - 8 1 g/dL    Albumin 4 3 3 6 - 5 1 g/dL    Globulin 2 5 1 9 - 3 7 g/dL (calc)    Albumin/Globulin Ratio 1 7 1 0 - 2 5 (calc)    TOTAL BILIRUBIN 0 5 0 2 - 1 2 mg/dL    Alkaline Phosphatase 98 40 - 115 U/L    AST 14 10 - 35 U/L    ALT 10 9 - 46 U/L   Lipid panel    Collection Time: 01/10/20  2:02 PM   Result Value Ref Range    Total Cholesterol 158 <200 mg/dL    HDL 67 >40 mg/dL    Triglycerides 79 <150 mg/dL    LDL Direct 75 mg/dL (calc)    Chol HDLC Ratio 2 4 <5 0 (calc)    Non-HDL Cholesterol 91 <130 mg/dL (calc)   PSA Total (Reflex To Free)    Collection Time: 01/10/20  2:02 PM   Result Value Ref Range    Prostate Specific Antigen Total 1 3 < OR = 4 0 ng/mL   Sedimentation rate, automated    Collection Time: 01/10/20  2:02 PM   Result Value Ref Range    Sedimentation Rate 2 < OR = 20 mm/h   C-reactive protein    Collection Time: 01/10/20  2:02 PM   Result Value Ref Range    C-Reactive Protein, Quant 25 8 (H) <8 0 mg/L   Lyme disease, western blot    Collection Time: 01/10/20  2:02 PM   Result Value Ref Range    Lyme Disease Ab (IgG), Blot NEGATIVE NEGATIVE    Lyme 18 kD IgG NON-REACTIVE     Lyme 23 kD IgG NON-REACTIVE     Lyme 28 kD IgG NON-REACTIVE     Lyme 30 kD IgG NON-REACTIVE     Lyme 39 kD IgG NON-REACTIVE     Lyme 41 kD IgG NON-REACTIVE     Lyme 45 kD IgG NON-REACTIVE     Lyme 58 kD IgG NON-REACTIVE     Lyme 66 kD IgG NON-REACTIVE     Lyme 93 kD IgG NON-REACTIVE     Lyme Disease Ab (IgM), Blot NEGATIVE NEGATIVE    Lyme 23 kD IgM NON-REACTIVE     Lyme 39 kD IgM NON-REACTIVE     Lyme 41 kD IgM REACTIVE (A)            Current Outpatient Medications:     alfuzosin (UROXATRAL) 10 mg 24 hr tablet, Take 10 mg by mouth daily, Disp: , Rfl:     aspirin 325 mg tablet, Take 325 mg by mouth, Disp: , Rfl:     atorvastatin (LIPITOR) 20 mg tablet, Take 20 mg by mouth daily, Disp: , Rfl:     celecoxib (CeleBREX) 200 mg capsule, TK 1 C PO QD PRN, Disp: , Rfl: 0    econazole nitrate 1 % cream, Apply 1 application topically, Disp: , Rfl:     finasteride (PROSCAR) 5 mg tablet, Take 5 mg by mouth, Disp: , Rfl:     HYDROcodone-acetaminophen (XODOL) 5-300 MG per tablet, TK 1 T PO Q 6 H PRN, Disp: , Rfl: 0    hydrOXYzine HCL (ATARAX) 25 mg tablet, Take 25 mg by mouth, Disp: , Rfl:     IRON, FERROUS SULFATE, PO, Take by mouth daily, Disp: , Rfl:     metaxalone (SKELAXIN) 800 mg tablet, Take 800 mg by mouth , Disp: , Rfl:     mometasone (NASONEX) 50 mcg/act nasal spray, 50 mcg into each nostril daily, Disp: , Rfl:     Naproxen Sodium (ALEVE) 220 MG CAPS, Take by mouth, Disp: , Rfl:     Pseudoephedrine HCl (SUDAFED PO), Take by mouth as needed, Disp: , Rfl:     Pseudoephedrine-guaiFENesin (MUCINEX D PO), Take by mouth as needed, Disp: , Rfl:     sildenafil (REVATIO) 20 mg tablet, Take 20 mg by mouth, Disp: , Rfl:     traMADol (ULTRAM) 50 mg tablet, TK 1 T PO UP TO TID QD PRN, Disp: , Rfl: 0    vitamin B-12 (CYANOCOBALAMIN) 100 MCG TABS, Take 1,000 mcg by mouth daily, Disp: , Rfl:     zolpidem (AMBIEN) 10 mg tablet, Take 10 mg by mouth 1/2 to 1 PRN, Disp: , Rfl:

## 2020-02-17 ENCOUNTER — OFFICE VISIT (OUTPATIENT)
Dept: FAMILY MEDICINE CLINIC | Facility: CLINIC | Age: 70
End: 2020-02-17
Payer: COMMERCIAL

## 2020-02-17 VITALS
HEIGHT: 67 IN | SYSTOLIC BLOOD PRESSURE: 120 MMHG | RESPIRATION RATE: 18 BRPM | TEMPERATURE: 96.9 F | BODY MASS INDEX: 24.64 KG/M2 | HEART RATE: 68 BPM | DIASTOLIC BLOOD PRESSURE: 80 MMHG | WEIGHT: 157 LBS

## 2020-02-17 DIAGNOSIS — H92.01 RIGHT EAR PAIN: Primary | ICD-10-CM

## 2020-02-17 PROBLEM — Z12.5 ENCOUNTER FOR PROSTATE CANCER SCREENING: Status: RESOLVED | Noted: 2020-01-10 | Resolved: 2020-02-17

## 2020-02-17 PROCEDURE — 3008F BODY MASS INDEX DOCD: CPT | Performed by: NURSE PRACTITIONER

## 2020-02-17 PROCEDURE — 99213 OFFICE O/P EST LOW 20 MIN: CPT | Performed by: NURSE PRACTITIONER

## 2020-02-17 PROCEDURE — 1160F RVW MEDS BY RX/DR IN RCRD: CPT | Performed by: NURSE PRACTITIONER

## 2020-02-17 PROCEDURE — 4040F PNEUMOC VAC/ADMIN/RCVD: CPT | Performed by: NURSE PRACTITIONER

## 2020-02-17 PROCEDURE — 1036F TOBACCO NON-USER: CPT | Performed by: NURSE PRACTITIONER

## 2020-02-17 NOTE — PATIENT INSTRUCTIONS
Monitor for improvement in pain  If pain persists over the next 5 days, we should consider having you see ENT

## 2020-02-17 NOTE — PROGRESS NOTES
Assessment/Plan:    1  Right ear pain  Assessment & Plan:  No signs of infection or TMJ at this time  TM intact  Advise pt that if pain continues, we should have him consult ENT for further evaluation  Orders:  -     neomycin-polymyxin-hydrocortisone (CORTISPORIN) otic solution; Administer 4 drops to the right ear every 6 (six) hours          Patient Instructions   Monitor for improvement in pain  If pain persists over the next 5 days, we should consider having you see ENT      Return in about 5 years (around 2/17/2025), or if symptoms worsen or fail to improve  Subjective:      Patient ID: Adrian Kearney is a 68 y o  male  Chief Complaint   Patient presents with   Nakia York is a 68year old male who presents to the office for evaluation of right ear pain x's 4 days  Describes the pain as stabbing every few minutes  States the pain awakens him at night  Denies fevers, chills, cough, headache, shortness of breath, wheezing  Reports history of sinus issues, sinus scraping about 3 years ago  Denies any current sinus pressure or pain  Reports he wears CPAP machine about 5 nights per week, no issues currently  The following portions of the patient's history were reviewed and updated as appropriate: allergies, current medications, past family history, past medical history, past social history, past surgical history and problem list     Review of Systems   Constitutional: Negative for chills, diaphoresis, fatigue and fever  HENT: Positive for ear pain  Negative for congestion, ear discharge, facial swelling, postnasal drip, rhinorrhea, sinus pressure, sinus pain and sore throat  Respiratory: Negative for cough, chest tightness, shortness of breath and wheezing  Cardiovascular: Negative for chest pain  Neurological: Negative for dizziness and headaches  Hematological: Negative for adenopathy           Current Outpatient Medications   Medication Sig Dispense Refill    alfuzosin (UROXATRAL) 10 mg 24 hr tablet Take 10 mg by mouth daily      aspirin 325 mg tablet Take 325 mg by mouth      atorvastatin (LIPITOR) 20 mg tablet Take 20 mg by mouth daily      celecoxib (CeleBREX) 200 mg capsule TK 1 C PO QD PRN  0    econazole nitrate 1 % cream Apply 1 application topically      finasteride (PROSCAR) 5 mg tablet Take 5 mg by mouth      HYDROcodone-acetaminophen (XODOL) 5-300 MG per tablet TK 1 T PO Q 6 H PRN  0    hydrOXYzine HCL (ATARAX) 10 mg tablet Take 10 mg by mouth       IRON, FERROUS SULFATE, PO Take by mouth daily      metaxalone (SKELAXIN) 800 mg tablet Take 800 mg by mouth as needed       mometasone (NASONEX) 50 mcg/act nasal spray 50 mcg into each nostril as needed       Naproxen Sodium (ALEVE) 220 MG CAPS Take by mouth      Pseudoephedrine HCl (SUDAFED PO) Take by mouth as needed      Pseudoephedrine-guaiFENesin (MUCINEX D PO) Take by mouth as needed      sildenafil (REVATIO) 20 mg tablet Take 20 mg by mouth      traMADol (ULTRAM) 50 mg tablet TK 1 T PO UP TO TID QD PRN  0    vitamin B-12 (CYANOCOBALAMIN) 100 MCG TABS Take 1,000 mcg by mouth daily      zolpidem (AMBIEN) 10 mg tablet Take 10 mg by mouth 1/2 to 1 PRN      neomycin-polymyxin-hydrocortisone (CORTISPORIN) otic solution Administer 4 drops to the right ear every 6 (six) hours 10 mL 0     No current facility-administered medications for this visit  Objective:    /80 (BP Location: Left arm, Patient Position: Sitting, Cuff Size: Large)   Pulse 68   Temp (!) 96 9 °F (36 1 °C) (Temporal)   Resp 18   Ht 5' 7" (1 702 m)   Wt 71 2 kg (157 lb)   BMI 24 59 kg/m²        Physical Exam   Constitutional: He is oriented to person, place, and time  He appears well-developed and well-nourished  No distress  HENT:   Head: Normocephalic and atraumatic  Right Ear: Tympanic membrane, external ear and ear canal normal  No swelling or tenderness  No middle ear effusion     Left Ear: Tympanic membrane, external ear and ear canal normal  No swelling or tenderness  No middle ear effusion  Nose: Nose normal  Right sinus exhibits no maxillary sinus tenderness and no frontal sinus tenderness  Left sinus exhibits no maxillary sinus tenderness and no frontal sinus tenderness  Mouth/Throat: Uvula is midline, oropharynx is clear and moist and mucous membranes are normal    Temporomandibular joint opens without clicking or tenderness   Eyes: Conjunctivae are normal  Right eye exhibits no discharge  Left eye exhibits no discharge  Neck: Normal range of motion  Neck supple  No thyromegaly present  Cardiovascular: Normal rate, regular rhythm and normal heart sounds  Pulmonary/Chest: Effort normal and breath sounds normal  No respiratory distress  He has no decreased breath sounds  He has no wheezes  He has no rhonchi  He has no rales  Lymphadenopathy:     He has no cervical adenopathy  Neurological: He is alert and oriented to person, place, and time  Skin: Skin is warm and dry  No rash noted  He is not diaphoretic  Psychiatric: He has a normal mood and affect   His behavior is normal  Judgment and thought content normal          CROW Figueredo

## 2020-02-17 NOTE — ASSESSMENT & PLAN NOTE
No signs of infection or TMJ at this time  TM intact  Advise pt that if pain continues, we should have him consult ENT for further evaluation

## 2020-03-24 ENCOUNTER — TELEMEDICINE (OUTPATIENT)
Dept: FAMILY MEDICINE CLINIC | Facility: CLINIC | Age: 70
End: 2020-03-24
Payer: COMMERCIAL

## 2020-03-24 DIAGNOSIS — J20.0 ACUTE BRONCHITIS DUE TO MYCOPLASMA PNEUMONIAE: Primary | ICD-10-CM

## 2020-03-24 PROBLEM — H92.01 RIGHT EAR PAIN: Status: RESOLVED | Noted: 2020-02-17 | Resolved: 2020-03-24

## 2020-03-24 PROCEDURE — 99214 OFFICE O/P EST MOD 30 MIN: CPT | Performed by: FAMILY MEDICINE

## 2020-03-24 RX ORDER — METHYLPREDNISOLONE 4 MG/1
TABLET ORAL
COMMUNITY
Start: 2020-03-05 | End: 2021-05-10 | Stop reason: ALTCHOICE

## 2020-03-24 RX ORDER — DOXYCYCLINE HYCLATE 100 MG/1
100 CAPSULE ORAL EVERY 12 HOURS SCHEDULED
Qty: 28 CAPSULE | Refills: 0 | Status: SHIPPED | OUTPATIENT
Start: 2020-03-24 | End: 2020-04-07

## 2020-03-24 NOTE — PROGRESS NOTES
Virtual Regular Visit    Problem List Items Addressed This Visit        Respiratory    Acute bronchitis due to Mycoplasma pneumoniae - Primary    Relevant Medications    doxycycline hyclate (VIBRAMYCIN) 100 mg capsule               Reason for visit is ACUTE VISIT - COUGH  DRY HACKING COUGH  X 2-3 WEEKS  LOST VOICE - BETTER NOW  SL ST  SL CONGESTION  NO FEVER   NO NVD    Encounter provider Jarod Wei MD    Provider located at [address] [place] [address]  [id number]      Recent Visits  No visits were found meeting these conditions  Showing recent visits within past 7 days and meeting all other requirements     Future Appointments  No visits were found meeting these conditions  Showing future appointments within next 150 days and meeting all other requirements        After connecting through [employer], the patient was identified by name and date of birth  Valentín Orellana was informed that this is a telemedicine visit and that the visit is being conducted through [employer] [address] [employer] which may not be secure and therefore, might not be HIPAA-compliant  My office door was closed  No one else was in the room  He acknowledged consent and understanding of privacy and security of the video platform  The patient has agreed to participate and understands they can discontinue the visit at any time  Subjective  Valentín Orellana is a 68 y o  male     History reviewed  No pertinent past medical history      Past Surgical History:   Procedure Laterality Date    HIP SURGERY Left     NISHA    HIP SURGERY Right     revision    KIDNEY STONE SURGERY      KNEE SURGERY Right     KNEE SURGERY Right     ligament    KNEE SURGERY Right     ligament    NASAL SINUS SURGERY      TONSILECTOMY AND ADNOIDECTOMY      TOTAL HIP ARTHROPLASTY Right        Current Outpatient Medications   Medication Sig Dispense Refill    alfuzosin (UROXATRAL) 10 mg 24 hr tablet Take 10 mg by mouth daily  aspirin 325 mg tablet Take 325 mg by mouth      atorvastatin (LIPITOR) 20 mg tablet Take 20 mg by mouth daily      celecoxib (CeleBREX) 200 mg capsule TK 1 C PO QD PRN  0    doxycycline hyclate (VIBRAMYCIN) 100 mg capsule Take 1 capsule (100 mg total) by mouth every 12 (twelve) hours for 14 days 28 capsule 0    econazole nitrate 1 % cream Apply 1 application topically      finasteride (PROSCAR) 5 mg tablet Take 5 mg by mouth      HYDROcodone-acetaminophen (XODOL) 5-300 MG per tablet TK 1 T PO Q 6 H PRN  0    hydrOXYzine HCL (ATARAX) 10 mg tablet Take 10 mg by mouth       IRON, FERROUS SULFATE, PO Take by mouth daily      metaxalone (SKELAXIN) 800 mg tablet Take 800 mg by mouth as needed       methylPREDNISolone 4 MG tablet therapy pack FPD      mometasone (NASONEX) 50 mcg/act nasal spray 50 mcg into each nostril as needed       Naproxen Sodium (ALEVE) 220 MG CAPS Take by mouth      neomycin-polymyxin-hydrocortisone (CORTISPORIN) otic solution Administer 4 drops to the right ear every 6 (six) hours 10 mL 0    Pseudoephedrine HCl (SUDAFED PO) Take by mouth as needed      Pseudoephedrine-guaiFENesin (MUCINEX D PO) Take by mouth as needed      sildenafil (REVATIO) 20 mg tablet Take 20 mg by mouth      traMADol (ULTRAM) 50 mg tablet TK 1 T PO UP TO TID QD PRN  0    vitamin B-12 (CYANOCOBALAMIN) 100 MCG TABS Take 1,000 mcg by mouth daily      zolpidem (AMBIEN) 10 mg tablet Take 10 mg by mouth 1/2 to 1 PRN       No current facility-administered medications for this visit  Allergies   Allergen Reactions    Meloxicam Other (See Comments)     suicidial / itching     Medical Tape Itching       Review of Systems   Constitutional: Negative for chills, fatigue and fever  HENT: Positive for congestion and sore throat  Negative for ear discharge, ear pain, mouth sores, postnasal drip and trouble swallowing  Eyes: Negative for pain, discharge and visual disturbance     Respiratory: Positive for cough  Negative for shortness of breath and wheezing  Cardiovascular: Negative for chest pain, palpitations and leg swelling  Gastrointestinal: Negative for abdominal distention, abdominal pain, blood in stool, diarrhea and nausea  Endocrine: Negative for polydipsia, polyphagia and polyuria  Genitourinary: Negative for dysuria, frequency, hematuria and urgency  Musculoskeletal: Negative for arthralgias, gait problem and joint swelling  Skin: Negative for pallor and rash  Neurological: Negative for dizziness, syncope, speech difficulty, weakness, light-headedness, numbness and headaches  Hematological: Negative for adenopathy  Psychiatric/Behavioral: Negative for behavioral problems, confusion and sleep disturbance  The patient is not nervous/anxious  Physical Exam       NO PE PERFORMED      I spent 25 minutes with the patient during this visit

## 2020-04-07 ENCOUNTER — TELEMEDICINE (OUTPATIENT)
Dept: FAMILY MEDICINE CLINIC | Facility: CLINIC | Age: 70
End: 2020-04-07
Payer: COMMERCIAL

## 2020-04-07 DIAGNOSIS — J20.0 ACUTE BRONCHITIS DUE TO MYCOPLASMA PNEUMONIAE: Primary | ICD-10-CM

## 2020-04-07 DIAGNOSIS — R05.9 COUGH: ICD-10-CM

## 2020-04-07 PROCEDURE — 99213 OFFICE O/P EST LOW 20 MIN: CPT | Performed by: FAMILY MEDICINE

## 2020-06-05 ENCOUNTER — NURSE TRIAGE (OUTPATIENT)
Dept: OTHER | Facility: OTHER | Age: 70
End: 2020-06-05

## 2020-06-06 ENCOUNTER — TELEPHONE (OUTPATIENT)
Dept: FAMILY MEDICINE CLINIC | Facility: CLINIC | Age: 70
End: 2020-06-06

## 2020-06-08 ENCOUNTER — TELEMEDICINE (OUTPATIENT)
Dept: FAMILY MEDICINE CLINIC | Facility: CLINIC | Age: 70
End: 2020-06-08
Payer: COMMERCIAL

## 2020-06-08 DIAGNOSIS — H92.09 OTALGIA, UNSPECIFIED LATERALITY: Primary | ICD-10-CM

## 2020-06-08 DIAGNOSIS — J30.1 SEASONAL ALLERGIC RHINITIS DUE TO POLLEN: ICD-10-CM

## 2020-06-08 PROBLEM — J20.0 ACUTE BRONCHITIS DUE TO MYCOPLASMA PNEUMONIAE: Status: RESOLVED | Noted: 2020-03-24 | Resolved: 2020-06-08

## 2020-06-08 PROBLEM — R05.9 COUGH: Status: RESOLVED | Noted: 2020-04-07 | Resolved: 2020-06-08

## 2020-06-08 PROCEDURE — 1160F RVW MEDS BY RX/DR IN RCRD: CPT | Performed by: FAMILY MEDICINE

## 2020-06-08 PROCEDURE — 99213 OFFICE O/P EST LOW 20 MIN: CPT | Performed by: FAMILY MEDICINE

## 2020-08-07 ENCOUNTER — OFFICE VISIT (OUTPATIENT)
Dept: FAMILY MEDICINE CLINIC | Facility: CLINIC | Age: 70
End: 2020-08-07
Payer: COMMERCIAL

## 2020-08-07 VITALS
BODY MASS INDEX: 24.17 KG/M2 | TEMPERATURE: 97.9 F | OXYGEN SATURATION: 99 % | HEIGHT: 67 IN | HEART RATE: 75 BPM | WEIGHT: 154 LBS | DIASTOLIC BLOOD PRESSURE: 86 MMHG | RESPIRATION RATE: 12 BRPM | SYSTOLIC BLOOD PRESSURE: 126 MMHG

## 2020-08-07 DIAGNOSIS — M25.552 HIP PAIN, BILATERAL: ICD-10-CM

## 2020-08-07 DIAGNOSIS — E78.01 FAMILIAL HYPERCHOLESTEROLEMIA: ICD-10-CM

## 2020-08-07 DIAGNOSIS — M25.551 HIP PAIN, BILATERAL: ICD-10-CM

## 2020-08-07 DIAGNOSIS — G47.33 OBSTRUCTIVE SLEEP APNEA SYNDROME: ICD-10-CM

## 2020-08-07 DIAGNOSIS — J30.1 SEASONAL ALLERGIC RHINITIS DUE TO POLLEN: Primary | ICD-10-CM

## 2020-08-07 DIAGNOSIS — F34.1 DYSTHYMIA: ICD-10-CM

## 2020-08-07 DIAGNOSIS — N40.0 PROSTATIC HYPERTROPHY: ICD-10-CM

## 2020-08-07 DIAGNOSIS — R76.8 HEPATITIS B SURFACE ANTIGEN POSITIVE: ICD-10-CM

## 2020-08-07 DIAGNOSIS — K21.9 LPRD (LARYNGOPHARYNGEAL REFLUX DISEASE): ICD-10-CM

## 2020-08-07 DIAGNOSIS — M15.9 PRIMARY OSTEOARTHRITIS INVOLVING MULTIPLE JOINTS: ICD-10-CM

## 2020-08-07 DIAGNOSIS — E78.2 MIXED HYPERLIPIDEMIA: Primary | ICD-10-CM

## 2020-08-07 DIAGNOSIS — R53.83 FATIGUE, UNSPECIFIED TYPE: ICD-10-CM

## 2020-08-07 PROCEDURE — 4040F PNEUMOC VAC/ADMIN/RCVD: CPT | Performed by: FAMILY MEDICINE

## 2020-08-07 PROCEDURE — 3008F BODY MASS INDEX DOCD: CPT | Performed by: FAMILY MEDICINE

## 2020-08-07 PROCEDURE — 1036F TOBACCO NON-USER: CPT | Performed by: FAMILY MEDICINE

## 2020-08-07 PROCEDURE — 99214 OFFICE O/P EST MOD 30 MIN: CPT | Performed by: FAMILY MEDICINE

## 2020-08-07 PROCEDURE — 1160F RVW MEDS BY RX/DR IN RCRD: CPT | Performed by: FAMILY MEDICINE

## 2020-08-07 RX ORDER — TAMSULOSIN HYDROCHLORIDE 0.4 MG/1
CAPSULE ORAL
COMMUNITY
Start: 2020-06-06 | End: 2020-08-07 | Stop reason: SDUPTHER

## 2020-08-08 RX ORDER — MOMETASONE FUROATE 50 UG/1
1 SPRAY, METERED NASAL AS NEEDED
Qty: 17 G | Refills: 3 | Status: SHIPPED | OUTPATIENT
Start: 2020-08-08 | End: 2021-06-01 | Stop reason: SDUPTHER

## 2020-08-08 RX ORDER — FINASTERIDE 5 MG/1
5 TABLET, FILM COATED ORAL DAILY
Qty: 30 TABLET | Refills: 3 | Status: SHIPPED | OUTPATIENT
Start: 2020-08-08 | End: 2020-12-23 | Stop reason: SDUPTHER

## 2020-08-08 RX ORDER — ATORVASTATIN CALCIUM 20 MG/1
20 TABLET, FILM COATED ORAL DAILY
Qty: 30 TABLET | Refills: 3 | Status: SHIPPED | OUTPATIENT
Start: 2020-08-08 | End: 2020-12-23 | Stop reason: SDUPTHER

## 2020-08-08 RX ORDER — TAMSULOSIN HYDROCHLORIDE 0.4 MG/1
0.4 CAPSULE ORAL DAILY
Qty: 30 CAPSULE | Refills: 3 | Status: SHIPPED | OUTPATIENT
Start: 2020-08-08 | End: 2021-06-01 | Stop reason: SDUPTHER

## 2020-08-10 LAB
ALBUMIN SERPL-MCNC: 4.2 G/DL (ref 3.6–5.1)
ALBUMIN/GLOB SERPL: 2.2 (CALC) (ref 1–2.5)
ALP SERPL-CCNC: 77 U/L (ref 35–144)
ALT SERPL-CCNC: 12 U/L (ref 9–46)
AST SERPL-CCNC: 14 U/L (ref 10–35)
BASOPHILS # BLD AUTO: 30 CELLS/UL (ref 0–200)
BASOPHILS NFR BLD AUTO: 0.8 %
BILIRUB SERPL-MCNC: 0.6 MG/DL (ref 0.2–1.2)
BUN SERPL-MCNC: 19 MG/DL (ref 7–25)
BUN/CREAT SERPL: NORMAL (CALC) (ref 6–22)
CALCIUM SERPL-MCNC: 9.5 MG/DL (ref 8.6–10.3)
CHLORIDE SERPL-SCNC: 106 MMOL/L (ref 98–110)
CHOLEST SERPL-MCNC: 152 MG/DL
CHOLEST/HDLC SERPL: 2.5 (CALC)
CO2 SERPL-SCNC: 27 MMOL/L (ref 20–32)
CREAT SERPL-MCNC: 0.83 MG/DL (ref 0.7–1.18)
CRP SERPL-MCNC: 1.1 MG/L
EOSINOPHIL # BLD AUTO: 232 CELLS/UL (ref 15–500)
EOSINOPHIL NFR BLD AUTO: 6.1 %
ERYTHROCYTE [DISTWIDTH] IN BLOOD BY AUTOMATED COUNT: 14 % (ref 11–15)
ERYTHROCYTE [SEDIMENTATION RATE] IN BLOOD BY WESTERGREN METHOD: 2 MM/H
GLOBULIN SER CALC-MCNC: 1.9 G/DL (CALC) (ref 1.9–3.7)
GLUCOSE SERPL-MCNC: 85 MG/DL (ref 65–99)
HBV SURFACE AB SERPL IA-ACNC: 29 MIU/ML
HBV SURFACE AG SERPL QL IA: NORMAL
HCT VFR BLD AUTO: 46.9 % (ref 38.5–50)
HDLC SERPL-MCNC: 62 MG/DL
HGB BLD-MCNC: 15.6 G/DL (ref 13.2–17.1)
LDLC SERPL CALC-MCNC: 76 MG/DL (CALC)
LYMPHOCYTES # BLD AUTO: 977 CELLS/UL (ref 850–3900)
LYMPHOCYTES NFR BLD AUTO: 25.7 %
MCH RBC QN AUTO: 29.8 PG (ref 27–33)
MCHC RBC AUTO-ENTMCNC: 33.3 G/DL (ref 32–36)
MCV RBC AUTO: 89.7 FL (ref 80–100)
MONOCYTES # BLD AUTO: 342 CELLS/UL (ref 200–950)
MONOCYTES NFR BLD AUTO: 9 %
NEUTROPHILS # BLD AUTO: 2219 CELLS/UL (ref 1500–7800)
NEUTROPHILS NFR BLD AUTO: 58.4 %
NONHDLC SERPL-MCNC: 90 MG/DL (CALC)
PLATELET # BLD AUTO: 153 THOUSAND/UL (ref 140–400)
PMV BLD REES-ECKER: 11.3 FL (ref 7.5–12.5)
POTASSIUM SERPL-SCNC: 4.5 MMOL/L (ref 3.5–5.3)
PROT SERPL-MCNC: 6.1 G/DL (ref 6.1–8.1)
RBC # BLD AUTO: 5.23 MILLION/UL (ref 4.2–5.8)
RHEUMATOID FACT SERPL-ACNC: <14 IU/ML
SL AMB EGFR AFRICAN AMERICAN: 102 ML/MIN/1.73M2
SL AMB EGFR NON AFRICAN AMERICAN: 88 ML/MIN/1.73M2
SODIUM SERPL-SCNC: 141 MMOL/L (ref 135–146)
TRIGL SERPL-MCNC: 65 MG/DL
TSH SERPL-ACNC: 1.4 MIU/L (ref 0.4–4.5)
URATE SERPL-MCNC: 5 MG/DL (ref 4–8)
WBC # BLD AUTO: 3.8 THOUSAND/UL (ref 3.8–10.8)

## 2020-09-28 ENCOUNTER — TELEPHONE (OUTPATIENT)
Dept: FAMILY MEDICINE CLINIC | Facility: CLINIC | Age: 70
End: 2020-09-28

## 2020-09-28 NOTE — TELEPHONE ENCOUNTER
Wanted to give you an update    Going for a ct scan for 11/2 at [facility], Chest angiogram and ct calcium score that was ordered Dr Fred Rudolph in [facility]    He will have them send results to you too

## 2020-10-22 ENCOUNTER — IMMUNIZATIONS (OUTPATIENT)
Dept: FAMILY MEDICINE CLINIC | Facility: CLINIC | Age: 70
End: 2020-10-22
Payer: COMMERCIAL

## 2020-10-22 DIAGNOSIS — Z23 ENCOUNTER FOR IMMUNIZATION: ICD-10-CM

## 2020-10-22 PROCEDURE — 90471 IMMUNIZATION ADMIN: CPT

## 2020-10-22 PROCEDURE — 90662 IIV NO PRSV INCREASED AG IM: CPT

## 2020-10-29 ENCOUNTER — TELEMEDICINE (OUTPATIENT)
Dept: FAMILY MEDICINE CLINIC | Facility: CLINIC | Age: 70
End: 2020-10-29
Payer: COMMERCIAL

## 2020-10-29 DIAGNOSIS — Z20.822 EXPOSURE TO COVID-19 VIRUS: Primary | ICD-10-CM

## 2020-10-29 PROCEDURE — 99212 OFFICE O/P EST SF 10 MIN: CPT | Performed by: NURSE PRACTITIONER

## 2020-11-02 DIAGNOSIS — Z20.822 EXPOSURE TO COVID-19 VIRUS: ICD-10-CM

## 2020-11-02 PROCEDURE — U0003 INFECTIOUS AGENT DETECTION BY NUCLEIC ACID (DNA OR RNA); SEVERE ACUTE RESPIRATORY SYNDROME CORONAVIRUS 2 (SARS-COV-2) (CORONAVIRUS DISEASE [COVID-19]), AMPLIFIED PROBE TECHNIQUE, MAKING USE OF HIGH THROUGHPUT TECHNOLOGIES AS DESCRIBED BY CMS-2020-01-R: HCPCS | Performed by: NURSE PRACTITIONER

## 2020-11-03 LAB — SARS-COV-2 RNA SPEC QL NAA+PROBE: NOT DETECTED

## 2020-11-08 ENCOUNTER — NURSE TRIAGE (OUTPATIENT)
Dept: OTHER | Facility: OTHER | Age: 70
End: 2020-11-08

## 2020-11-09 ENCOUNTER — OFFICE VISIT (OUTPATIENT)
Dept: FAMILY MEDICINE CLINIC | Facility: CLINIC | Age: 70
End: 2020-11-09
Payer: COMMERCIAL

## 2020-11-09 VITALS
RESPIRATION RATE: 16 BRPM | TEMPERATURE: 97.3 F | SYSTOLIC BLOOD PRESSURE: 134 MMHG | BODY MASS INDEX: 24.48 KG/M2 | HEART RATE: 80 BPM | HEIGHT: 67 IN | WEIGHT: 156 LBS | DIASTOLIC BLOOD PRESSURE: 80 MMHG

## 2020-11-09 DIAGNOSIS — H65.21 RIGHT CHRONIC SEROUS OTITIS MEDIA: ICD-10-CM

## 2020-11-09 DIAGNOSIS — E78.2 MIXED HYPERLIPIDEMIA: ICD-10-CM

## 2020-11-09 DIAGNOSIS — H92.01 RIGHT EAR PAIN: Primary | ICD-10-CM

## 2020-11-09 DIAGNOSIS — M15.9 PRIMARY OSTEOARTHRITIS INVOLVING MULTIPLE JOINTS: ICD-10-CM

## 2020-11-09 DIAGNOSIS — I71.2 THORACIC AORTIC ANEURYSM WITHOUT RUPTURE (HCC): ICD-10-CM

## 2020-11-09 PROCEDURE — 3008F BODY MASS INDEX DOCD: CPT | Performed by: FAMILY MEDICINE

## 2020-11-09 PROCEDURE — 1101F PT FALLS ASSESS-DOCD LE1/YR: CPT | Performed by: FAMILY MEDICINE

## 2020-11-09 PROCEDURE — 3725F SCREEN DEPRESSION PERFORMED: CPT | Performed by: FAMILY MEDICINE

## 2020-11-09 PROCEDURE — 3288F FALL RISK ASSESSMENT DOCD: CPT | Performed by: FAMILY MEDICINE

## 2020-11-09 PROCEDURE — 1036F TOBACCO NON-USER: CPT | Performed by: FAMILY MEDICINE

## 2020-11-09 PROCEDURE — 99214 OFFICE O/P EST MOD 30 MIN: CPT | Performed by: FAMILY MEDICINE

## 2020-11-09 PROCEDURE — 1160F RVW MEDS BY RX/DR IN RCRD: CPT | Performed by: FAMILY MEDICINE

## 2020-12-08 ENCOUNTER — TELEPHONE (OUTPATIENT)
Dept: FAMILY MEDICINE CLINIC | Facility: CLINIC | Age: 70
End: 2020-12-08

## 2020-12-08 DIAGNOSIS — Z20.828 SARS-ASSOCIATED CORONAVIRUS EXPOSURE: Primary | ICD-10-CM

## 2020-12-11 DIAGNOSIS — E78.01 FAMILIAL HYPERCHOLESTEROLEMIA: ICD-10-CM

## 2020-12-11 DIAGNOSIS — Z11.59 NEED FOR HEPATITIS C SCREENING TEST: ICD-10-CM

## 2020-12-11 DIAGNOSIS — Z00.00 ROUTINE GENERAL MEDICAL EXAMINATION AT A HEALTH CARE FACILITY: Primary | ICD-10-CM

## 2020-12-11 DIAGNOSIS — G47.33 OBSTRUCTIVE SLEEP APNEA SYNDROME: ICD-10-CM

## 2020-12-11 DIAGNOSIS — Z13.6 SCREENING FOR HYPERTENSION: ICD-10-CM

## 2020-12-11 DIAGNOSIS — Z13.29 SCREENING FOR THYROID DISORDER: ICD-10-CM

## 2020-12-11 DIAGNOSIS — Z12.5 ENCOUNTER FOR PROSTATE CANCER SCREENING: ICD-10-CM

## 2020-12-11 DIAGNOSIS — R53.83 FATIGUE, UNSPECIFIED TYPE: ICD-10-CM

## 2020-12-11 DIAGNOSIS — E78.2 MIXED HYPERLIPIDEMIA: ICD-10-CM

## 2020-12-15 LAB — SARS-COV-2 IGG SERPL QL IA: NEGATIVE

## 2020-12-23 DIAGNOSIS — N40.0 PROSTATIC HYPERTROPHY: ICD-10-CM

## 2020-12-23 DIAGNOSIS — E78.01 FAMILIAL HYPERCHOLESTEROLEMIA: ICD-10-CM

## 2020-12-23 RX ORDER — ATORVASTATIN CALCIUM 20 MG/1
20 TABLET, FILM COATED ORAL DAILY
Qty: 90 TABLET | Refills: 3 | Status: SHIPPED | OUTPATIENT
Start: 2020-12-23 | End: 2022-01-13

## 2020-12-23 RX ORDER — FINASTERIDE 5 MG/1
5 TABLET, FILM COATED ORAL DAILY
Qty: 90 TABLET | Refills: 3 | Status: SHIPPED | OUTPATIENT
Start: 2020-12-23 | End: 2022-01-13

## 2021-01-18 ENCOUNTER — TELEPHONE (OUTPATIENT)
Dept: FAMILY MEDICINE CLINIC | Facility: CLINIC | Age: 71
End: 2021-01-18

## 2021-01-18 DIAGNOSIS — T56.891A COBALT POISONING, ACCIDENTAL OR UNINTENTIONAL, INITIAL ENCOUNTER: Primary | ICD-10-CM

## 2021-01-18 NOTE — TELEPHONE ENCOUNTER
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

## 2021-01-20 LAB
ALBUMIN SERPL-MCNC: 4.4 G/DL (ref 3.6–5.1)
ALBUMIN/GLOB SERPL: 2.2 (CALC) (ref 1–2.5)
ALP SERPL-CCNC: 79 U/L (ref 35–144)
ALT SERPL-CCNC: 15 U/L (ref 9–46)
AST SERPL-CCNC: 17 U/L (ref 10–35)
BASOPHILS # BLD AUTO: 61 CELLS/UL (ref 0–200)
BASOPHILS NFR BLD AUTO: 1.3 %
BILIRUB SERPL-MCNC: 0.9 MG/DL (ref 0.2–1.2)
BUN SERPL-MCNC: 20 MG/DL (ref 7–25)
BUN/CREAT SERPL: NORMAL (CALC) (ref 6–22)
CALCIUM SERPL-MCNC: 9.7 MG/DL (ref 8.6–10.3)
CHLORIDE SERPL-SCNC: 104 MMOL/L (ref 98–110)
CHOLEST SERPL-MCNC: 157 MG/DL
CHOLEST/HDLC SERPL: 2.2 (CALC)
CO2 SERPL-SCNC: 29 MMOL/L (ref 20–32)
CREAT SERPL-MCNC: 0.89 MG/DL (ref 0.7–1.18)
CRP SERPL-MCNC: 1.5 MG/L
EOSINOPHIL # BLD AUTO: 357 CELLS/UL (ref 15–500)
EOSINOPHIL NFR BLD AUTO: 7.6 %
ERYTHROCYTE [DISTWIDTH] IN BLOOD BY AUTOMATED COUNT: 13.7 % (ref 11–15)
ERYTHROCYTE [SEDIMENTATION RATE] IN BLOOD BY WESTERGREN METHOD: 2 MM/H
GLOBULIN SER CALC-MCNC: 2 G/DL (CALC) (ref 1.9–3.7)
GLUCOSE SERPL-MCNC: 93 MG/DL (ref 65–99)
HCT VFR BLD AUTO: 48.2 % (ref 38.5–50)
HCV AB S/CO SERPL IA: 0.01
HCV AB SERPL QL IA: NORMAL
HDLC SERPL-MCNC: 70 MG/DL
HGB BLD-MCNC: 16.1 G/DL (ref 13.2–17.1)
LDLC SERPL CALC-MCNC: 69 MG/DL (CALC)
LYMPHOCYTES # BLD AUTO: 1363 CELLS/UL (ref 850–3900)
LYMPHOCYTES NFR BLD AUTO: 29 %
MCH RBC QN AUTO: 30 PG (ref 27–33)
MCHC RBC AUTO-ENTMCNC: 33.4 G/DL (ref 32–36)
MCV RBC AUTO: 89.8 FL (ref 80–100)
MONOCYTES # BLD AUTO: 385 CELLS/UL (ref 200–950)
MONOCYTES NFR BLD AUTO: 8.2 %
NEUTROPHILS # BLD AUTO: 2533 CELLS/UL (ref 1500–7800)
NEUTROPHILS NFR BLD AUTO: 53.9 %
NONHDLC SERPL-MCNC: 87 MG/DL (CALC)
PLATELET # BLD AUTO: 161 THOUSAND/UL (ref 140–400)
PMV BLD REES-ECKER: 11.5 FL (ref 7.5–12.5)
POTASSIUM SERPL-SCNC: 4.5 MMOL/L (ref 3.5–5.3)
PROT SERPL-MCNC: 6.4 G/DL (ref 6.1–8.1)
PSA SERPL-MCNC: 0.8 NG/ML
RBC # BLD AUTO: 5.37 MILLION/UL (ref 4.2–5.8)
SL AMB EGFR AFRICAN AMERICAN: 99 ML/MIN/1.73M2
SL AMB EGFR NON AFRICAN AMERICAN: 85 ML/MIN/1.73M2
SODIUM SERPL-SCNC: 141 MMOL/L (ref 135–146)
TRIGL SERPL-MCNC: 95 MG/DL
TSH SERPL-ACNC: 2.75 MIU/L (ref 0.4–4.5)
WBC # BLD AUTO: 4.7 THOUSAND/UL (ref 3.8–10.8)

## 2021-01-21 LAB — COBALT SERPL-MCNC: 0.6 MCG/L (ref 0.1–0.4)

## 2021-02-10 ENCOUNTER — OFFICE VISIT (OUTPATIENT)
Dept: FAMILY MEDICINE CLINIC | Facility: CLINIC | Age: 71
End: 2021-02-10
Payer: COMMERCIAL

## 2021-02-10 VITALS
SYSTOLIC BLOOD PRESSURE: 138 MMHG | RESPIRATION RATE: 18 BRPM | HEART RATE: 72 BPM | DIASTOLIC BLOOD PRESSURE: 80 MMHG | TEMPERATURE: 97.7 F | OXYGEN SATURATION: 98 % | BODY MASS INDEX: 24.8 KG/M2 | WEIGHT: 158 LBS | HEIGHT: 67 IN

## 2021-02-10 DIAGNOSIS — I71.2 THORACIC AORTIC ANEURYSM WITHOUT RUPTURE (HCC): ICD-10-CM

## 2021-02-10 DIAGNOSIS — K21.9 LPRD (LARYNGOPHARYNGEAL REFLUX DISEASE): ICD-10-CM

## 2021-02-10 DIAGNOSIS — Z13.6 SCREENING FOR HYPERTENSION: ICD-10-CM

## 2021-02-10 DIAGNOSIS — E78.2 MIXED HYPERLIPIDEMIA: ICD-10-CM

## 2021-02-10 DIAGNOSIS — F34.1 DYSTHYMIA: ICD-10-CM

## 2021-02-10 DIAGNOSIS — E78.01 FAMILIAL HYPERCHOLESTEROLEMIA: ICD-10-CM

## 2021-02-10 DIAGNOSIS — G47.33 OBSTRUCTIVE SLEEP APNEA SYNDROME: ICD-10-CM

## 2021-02-10 DIAGNOSIS — N40.0 PROSTATIC HYPERTROPHY: ICD-10-CM

## 2021-02-10 DIAGNOSIS — Z00.00 ROUTINE GENERAL MEDICAL EXAMINATION AT A HEALTH CARE FACILITY: Primary | ICD-10-CM

## 2021-02-10 DIAGNOSIS — Z12.11 COLON CANCER SCREENING: ICD-10-CM

## 2021-02-10 DIAGNOSIS — M15.9 PRIMARY OSTEOARTHRITIS INVOLVING MULTIPLE JOINTS: ICD-10-CM

## 2021-02-10 DIAGNOSIS — J31.0 CHRONIC RHINITIS: ICD-10-CM

## 2021-02-10 PROBLEM — H92.09 OTALGIA: Status: RESOLVED | Noted: 2020-06-08 | Resolved: 2021-02-10

## 2021-02-10 LAB
ECG INTERP DURING EX: NORMAL MS
SL AMB POCT FECES OCC BLD: NORMAL

## 2021-02-10 PROCEDURE — 99397 PER PM REEVAL EST PAT 65+ YR: CPT | Performed by: FAMILY MEDICINE

## 2021-02-10 PROCEDURE — 3008F BODY MASS INDEX DOCD: CPT | Performed by: FAMILY MEDICINE

## 2021-02-10 PROCEDURE — 1036F TOBACCO NON-USER: CPT | Performed by: FAMILY MEDICINE

## 2021-02-10 PROCEDURE — 1160F RVW MEDS BY RX/DR IN RCRD: CPT | Performed by: FAMILY MEDICINE

## 2021-02-10 PROCEDURE — 82270 OCCULT BLOOD FECES: CPT | Performed by: FAMILY MEDICINE

## 2021-02-10 PROCEDURE — 93000 ELECTROCARDIOGRAM COMPLETE: CPT | Performed by: FAMILY MEDICINE

## 2021-02-10 RX ORDER — HYDROXYZINE HYDROCHLORIDE 25 MG/1
TABLET, FILM COATED ORAL
COMMUNITY
Start: 2021-01-22 | End: 2021-08-03

## 2021-02-10 NOTE — LETTER
[address] [facility],  [address]      Current Outpatient Medications:     alfuzosin (UROXATRAL) 10 mg 24 hr tablet, Take 10 mg by mouth daily, Disp: , Rfl:     aspirin 325 mg tablet, Take 325 mg by mouth, Disp: , Rfl:     atorvastatin (LIPITOR) 20 mg tablet, Take 1 tablet (20 mg total) by mouth daily, Disp: 90 tablet, Rfl: 3    celecoxib (CeleBREX) 200 mg capsule, TK 1 C PO QD PRN, Disp: , Rfl: 0    econazole nitrate 1 % cream, Apply 1 application topically, Disp: , Rfl:     finasteride (PROSCAR) 5 mg tablet, Take 1 tablet (5 mg total) by mouth daily, Disp: 90 tablet, Rfl: 3    HYDROcodone-acetaminophen (XODOL) 5-300 MG per tablet, TK 1 T PO Q 6 H PRN, Disp: , Rfl: 0    hydrOXYzine HCL (ATARAX) 10 mg tablet, Take 10 mg by mouth , Disp: , Rfl:     IRON, FERROUS SULFATE, PO, Take by mouth daily, Disp: , Rfl:     metaxalone (SKELAXIN) 800 mg tablet, Take 800 mg by mouth as needed , Disp: , Rfl:     methylPREDNISolone 4 MG tablet therapy pack, FPD, Disp: , Rfl:     mometasone (NASONEX) 50 mcg/act nasal spray, 1 spray into each nostril as needed (nasal congestion), Disp: 17 g, Rfl: 3    Naproxen Sodium (ALEVE) 220 MG CAPS, Take by mouth, Disp: , Rfl:     neomycin-polymyxin-hydrocortisone (CORTISPORIN) otic solution, Administer 4 drops to the right ear every 6 (six) hours, Disp: 10 mL, Rfl: 0    Pseudoephedrine HCl (SUDAFED PO), Take by mouth as needed, Disp: , Rfl:     Pseudoephedrine-guaiFENesin (MUCINEX D PO), Take by mouth as needed, Disp: , Rfl:     sildenafil (REVATIO) 20 mg tablet, Take 20 mg by mouth, Disp: , Rfl:     tamsulosin (FLOMAX) 0 4 mg, Take 1 capsule (0 4 mg total) by mouth daily, Disp: 30 capsule, Rfl: 3    traMADol (ULTRAM) 50 mg tablet, TK 1 T PO UP TO TID QD PRN, Disp: , Rfl: 0    vitamin B-12 (CYANOCOBALAMIN) 100 MCG TABS, Take 1,000 mcg by mouth daily, Disp: , Rfl:     zolpidem (AMBIEN) 10 mg tablet, Take 10 mg by mouth 1/2 to 1 PRN, Disp: , Rfl:       Recent Results (from the past 672 hour(s)) CBC and differential    Collection Time: 01/19/21  7:49 AM   Result Value Ref Range    White Blood Cell Count 4 7 3 8 - 10 8 Thousand/uL    Red Blood Cell Count 5 37 4 20 - 5 80 Million/uL    Hemoglobin 16 1 13 2 - 17 1 g/dL    HCT 48 2 38 5 - 50 0 %    MCV 89 8 80 0 - 100 0 fL    MCH 30 0 27 0 - 33 0 pg    MCHC 33 4 32 0 - 36 0 g/dL    RDW 13 7 11 0 - 15 0 %    Platelet Count 669 549 - 400 Thousand/uL    SL AMB MPV 11 5 7 5 - 12 5 fL    Neutrophils (Absolute) 2,533 1,500 - 7,800 cells/uL    Lymphocytes (Absolute) 1,363 850 - 3,900 cells/uL    Monocytes (Absolute) 385 200 - 950 cells/uL    Eosinophils (Absolute) 357 15 - 500 cells/uL    Basophils ABS 61 0 - 200 cells/uL    Neutrophils 53 9 %    Lymphocytes 29 0 %    Monocytes 8 2 %    Eosinophils 7 6 %    Basophils PCT 1 3 %   Comprehensive metabolic panel    Collection Time: 01/19/21  7:49 AM   Result Value Ref Range    Glucose, Random 93 65 - 99 mg/dL    BUN 20 7 - 25 mg/dL    Creatinine 0 89 0 70 - 1 18 mg/dL    eGFR Non  85 > OR = 60 mL/min/1 73m2    eGFR  99 > OR = 60 mL/min/1 73m2    SL AMB BUN/CREATININE RATIO NOT APPLICABLE 6 - 22 (calc)    Sodium 141 135 - 146 mmol/L    Potassium 4 5 3 5 - 5 3 mmol/L    Chloride 104 98 - 110 mmol/L    CO2 29 20 - 32 mmol/L    Calcium 9 7 8 6 - 10 3 mg/dL    Protein, Total 6 4 6 1 - 8 1 g/dL    Albumin 4 4 3 6 - 5 1 g/dL    Globulin 2 0 1 9 - 3 7 g/dL (calc)    Albumin/Globulin Ratio 2 2 1 0 - 2 5 (calc)    TOTAL BILIRUBIN 0 9 0 2 - 1 2 mg/dL    Alkaline Phosphatase 79 35 - 144 U/L    AST 17 10 - 35 U/L    ALT 15 9 - 46 U/L   Lipid panel    Collection Time: 01/19/21  7:49 AM   Result Value Ref Range    Total Cholesterol 157 <200 mg/dL    HDL 70 > OR = 40 mg/dL    Triglycerides 95 <150 mg/dL    LDL Calculated 69 mg/dL (calc)    Chol HDLC Ratio 2 2 <5 0 (calc)    Non-HDL Cholesterol 87 <130 mg/dL (calc)   PSA Total (Reflex To Free)    Collection Time: 01/19/21  7:49 AM   Result Value Ref Range Prostate Specific Antigen Total 0 8 < OR = 4 0 ng/mL   TSH, 3rd generation    Collection Time: 01/19/21  7:49 AM   Result Value Ref Range    TSH 2 75 0 40 - 4 50 mIU/L   Hepatitis C Ab W/Refl To HCV RNA, Qn, PCR    Collection Time: 01/19/21  7:49 AM   Result Value Ref Range    HEP C AB NON-REACTIVE NON-REACTIVE    Signal to Cut-Off 0 01 <1 00   Sedimentation rate, automated    Collection Time: 01/19/21  7:49 AM   Result Value Ref Range    Sedimentation Rate 2 < OR = 20 mm/h   C-reactive protein    Collection Time: 01/19/21  7:49 AM   Result Value Ref Range    C-Reactive Protein, Quant 1 5 <8 0 mg/L   COBALT, SERUM/PLASMA    Collection Time: 01/19/21  7:53 AM   Result Value Ref Range    [place], Plasma 0 6 (H) 0 1 - 0 4 mcg/L

## 2021-02-10 NOTE — PATIENT INSTRUCTIONS
DISCUSSED HEALTH MAINTENENCE ISSUES  BW WILL BE REVIEWED  ENCOURAGED HEALTHY DIET AND EXERCISE  COLONOSCOPY WILL BE ORDERED  RV FOR ANNUAL HEALTH EXAM IN 1 YEAR  RV SOONER IF THERE ARE ANY CONCERNS      Recent Results (from the past 672 hour(s))   CBC and differential    Collection Time: 01/19/21  7:49 AM   Result Value Ref Range    White Blood Cell Count 4 7 3 8 - 10 8 Thousand/uL    Red Blood Cell Count 5 37 4 20 - 5 80 Million/uL    Hemoglobin 16 1 13 2 - 17 1 g/dL    HCT 48 2 38 5 - 50 0 %    MCV 89 8 80 0 - 100 0 fL    MCH 30 0 27 0 - 33 0 pg    MCHC 33 4 32 0 - 36 0 g/dL    RDW 13 7 11 0 - 15 0 %    Platelet Count 782 698 - 400 Thousand/uL    SL AMB MPV 11 5 7 5 - 12 5 fL    Neutrophils (Absolute) 2,533 1,500 - 7,800 cells/uL    Lymphocytes (Absolute) 1,363 850 - 3,900 cells/uL    Monocytes (Absolute) 385 200 - 950 cells/uL    Eosinophils (Absolute) 357 15 - 500 cells/uL    Basophils ABS 61 0 - 200 cells/uL    Neutrophils 53 9 %    Lymphocytes 29 0 %    Monocytes 8 2 %    Eosinophils 7 6 %    Basophils PCT 1 3 %   Comprehensive metabolic panel    Collection Time: 01/19/21  7:49 AM   Result Value Ref Range    Glucose, Random 93 65 - 99 mg/dL    BUN 20 7 - 25 mg/dL    Creatinine 0 89 0 70 - 1 18 mg/dL    eGFR Non  85 > OR = 60 mL/min/1 73m2    eGFR  99 > OR = 60 mL/min/1 73m2    SL AMB BUN/CREATININE RATIO NOT APPLICABLE 6 - 22 (calc)    Sodium 141 135 - 146 mmol/L    Potassium 4 5 3 5 - 5 3 mmol/L    Chloride 104 98 - 110 mmol/L    CO2 29 20 - 32 mmol/L    Calcium 9 7 8 6 - 10 3 mg/dL    Protein, Total 6 4 6 1 - 8 1 g/dL    Albumin 4 4 3 6 - 5 1 g/dL    Globulin 2 0 1 9 - 3 7 g/dL (calc)    Albumin/Globulin Ratio 2 2 1 0 - 2 5 (calc)    TOTAL BILIRUBIN 0 9 0 2 - 1 2 mg/dL    Alkaline Phosphatase 79 35 - 144 U/L    AST 17 10 - 35 U/L    ALT 15 9 - 46 U/L   Lipid panel    Collection Time: 01/19/21  7:49 AM   Result Value Ref Range    Total Cholesterol 157 <200 mg/dL    HDL 70 > OR = 40 mg/dL    Triglycerides 95 <150 mg/dL    LDL Calculated 69 mg/dL (calc)    Chol HDLC Ratio 2 2 <5 0 (calc)    Non-HDL Cholesterol 87 <130 mg/dL (calc)   PSA Total (Reflex To Free)    Collection Time: 01/19/21  7:49 AM   Result Value Ref Range    Prostate Specific Antigen Total 0 8 < OR = 4 0 ng/mL   TSH, 3rd generation    Collection Time: 01/19/21  7:49 AM   Result Value Ref Range    TSH 2 75 0 40 - 4 50 mIU/L   Hepatitis C Ab W/Refl To HCV RNA, Qn, PCR    Collection Time: 01/19/21  7:49 AM   Result Value Ref Range    HEP C AB NON-REACTIVE NON-REACTIVE    Signal to Cut-Off 0 01 <1 00   Sedimentation rate, automated    Collection Time: 01/19/21  7:49 AM   Result Value Ref Range    Sedimentation Rate 2 < OR = 20 mm/h   C-reactive protein    Collection Time: 01/19/21  7:49 AM   Result Value Ref Range    C-Reactive Protein, Quant 1 5 <8 0 mg/L   COBALT, SERUM/PLASMA    Collection Time: 01/19/21  7:53 AM   Result Value Ref Range    [place], Plasma 0 6 (H) 0 1 - 0 4 mcg/L

## 2021-02-10 NOTE — PROGRESS NOTES
FAMILY PRACTICE HEALTH MAINTENANCE OFFICE VISIT  [facility] - CHARISSE Gray [address] [facility] PHYSICIANS    NAME: Mena Nickerson  AGE: 65 y o  SEX: male  : 1950     DATE: 2/10/2021    Assessment and Plan     Problem List Items Addressed This Visit        Digestive    LPRD (laryngopharyngeal reflux disease)       Respiratory    Chronic rhinitis    Obstructive sleep apnea syndrome       Cardiovascular and Mediastinum    Thoracic aortic aneurysm without rupture (HCC)    Relevant Orders    POCT ECG (Completed)       Musculoskeletal and Integument    Osteoarthritis       Other    Mixed hyperlipidemia    Prostatic hypertrophy    Dysthymia    Relevant Medications    hydrOXYzine HCL (ATARAX) 25 mg tablet    RESOLVED: Familial hypercholesterolemia      Other Visit Diagnoses     Routine general medical examination at a health care facility    -  Primary    Screening for hypertension        Relevant Orders    POCT ECG (Completed)    Colon cancer screening        Relevant Orders    POCT hemoccult screening (Completed)               Return in about 6 months (around 8/10/2021) for Recheck          Chief Complaint     Chief Complaint   Patient presents with    Physical Exam       History of Present Illness     [facility] [address] RECORD  NO CONCERNS AT THIS TIME        Well Adult Physical   Patient here for a comprehensive physical exam       Diet and Physical Activity  Diet: well balanced diet  Weight concerns: None, patient's BMI is between 18 5-24 9  Exercise: intermittently      Depression Screen  PHQ-9 Depression Screening    PHQ-9:   Frequency of the following problems over the past two weeks:              General Health  Hearing: Normal:  bilateral  Vision: no vision problems  Dental: regular dental visits    Reproductive Health          The following portions of the patient's history were reviewed and updated as appropriate: allergies, current medications, past family history, past medical history, past social history, past surgical history and problem list     Review of Systems     Review of Systems   Constitutional: Negative for chills, fatigue and fever  HENT: Negative for congestion, ear discharge, ear pain, mouth sores, postnasal drip, sore throat and trouble swallowing  Eyes: Negative for pain, discharge and visual disturbance  Respiratory: Negative for cough, shortness of breath and wheezing  Cardiovascular: Negative for chest pain, palpitations and leg swelling  Gastrointestinal: Negative for abdominal distention, abdominal pain, blood in stool, diarrhea and nausea  Endocrine: Negative for polydipsia, polyphagia and polyuria  Genitourinary: Negative for dysuria, frequency, hematuria and urgency  Musculoskeletal: Negative for arthralgias, gait problem and joint swelling  Skin: Negative for pallor and rash  Neurological: Negative for dizziness, syncope, speech difficulty, weakness, light-headedness, numbness and headaches  Hematological: Negative for adenopathy  Psychiatric/Behavioral: Negative for behavioral problems, confusion and sleep disturbance  The patient is not nervous/anxious  Past Medical History     History reviewed  No pertinent past medical history      Past Surgical History     Past Surgical History:   Procedure Laterality Date    HIP SURGERY Left     NISHA    HIP SURGERY Right     revision    KIDNEY STONE SURGERY      KNEE SURGERY Right     KNEE SURGERY Right     ligament    KNEE SURGERY Right     ligament    NASAL SINUS SURGERY      TONSILECTOMY AND ADNOIDECTOMY      TOTAL HIP ARTHROPLASTY Right        Social History     Social History     Socioeconomic History    Marital status: /Civil Union     Spouse name: None    Number of children: None    Years of education: None    Highest education level: None   Occupational History    None   Social Needs    Financial resource strain: None    Food insecurity     Worry: None Inability: None    Transportation needs     Medical: None     Non-medical: None   Tobacco Use    Smoking status: Never Smoker    Smokeless tobacco: Never Used   Substance and Sexual Activity    Alcohol use: Yes     Frequency: Monthly or less    Drug use: Not Currently    Sexual activity: Yes     Partners: Female     Birth control/protection: None   Lifestyle    Physical activity     Days per week: None     Minutes per session: None    Stress: None   Relationships    Social connections     Talks on phone: None     Gets together: None     Attends [employer] service: None     Active member of club or organization: None     Attends meetings of clubs or organizations: None     Relationship status: None    Intimate partner violence     Fear of current or ex partner: None     Emotionally abused: None     Physically abused: None     Forced sexual activity: None   Other Topics Concern    None   Social History Narrative    None       Family History     Family History   Problem Relation Age of Onset    Cancer Mother     Arthritis Mother     Prostate cancer Father     Hyperlipidemia Father     Hypertension Father     Prostate cancer Paternal Uncle     Mental illness Neg Hx     Substance Abuse Neg Hx        Current Medications       Current Outpatient Medications:     alfuzosin (UROXATRAL) 10 mg 24 hr tablet, Take 10 mg by mouth daily, Disp: , Rfl:     aspirin 325 mg tablet, Take 325 mg by mouth, Disp: , Rfl:     atorvastatin (LIPITOR) 20 mg tablet, Take 1 tablet (20 mg total) by mouth daily, Disp: 90 tablet, Rfl: 3    celecoxib (CeleBREX) 200 mg capsule, TK 1 C PO QD PRN, Disp: , Rfl: 0    econazole nitrate 1 % cream, Apply 1 application topically, Disp: , Rfl:     finasteride (PROSCAR) 5 mg tablet, Take 1 tablet (5 mg total) by mouth daily, Disp: 90 tablet, Rfl: 3    HYDROcodone-acetaminophen (XODOL) 5-300 MG per tablet, TK 1 T PO Q 6 H PRN, Disp: , Rfl: 0    hydrOXYzine HCL (ATARAX) 10 mg tablet, Take 10 mg by mouth , Disp: , Rfl:     IRON, FERROUS SULFATE, PO, Take by mouth daily, Disp: , Rfl:     metaxalone (SKELAXIN) 800 mg tablet, Take 800 mg by mouth as needed , Disp: , Rfl:     methylPREDNISolone 4 MG tablet therapy pack, FPD, Disp: , Rfl:     mometasone (NASONEX) 50 mcg/act nasal spray, 1 spray into each nostril as needed (nasal congestion), Disp: 17 g, Rfl: 3    Naproxen Sodium (ALEVE) 220 MG CAPS, Take by mouth, Disp: , Rfl:     neomycin-polymyxin-hydrocortisone (CORTISPORIN) otic solution, Administer 4 drops to the right ear every 6 (six) hours, Disp: 10 mL, Rfl: 0    Pseudoephedrine HCl (SUDAFED PO), Take by mouth as needed, Disp: , Rfl:     Pseudoephedrine-guaiFENesin (MUCINEX D PO), Take by mouth as needed, Disp: , Rfl:     sildenafil (REVATIO) 20 mg tablet, Take 20 mg by mouth, Disp: , Rfl:     tamsulosin (FLOMAX) 0 4 mg, Take 1 capsule (0 4 mg total) by mouth daily, Disp: 30 capsule, Rfl: 3    traMADol (ULTRAM) 50 mg tablet, TK 1 T PO UP TO TID QD PRN, Disp: , Rfl: 0    vitamin B-12 (CYANOCOBALAMIN) 100 MCG TABS, Take 1,000 mcg by mouth daily, Disp: , Rfl:     zolpidem (AMBIEN) 10 mg tablet, Take 10 mg by mouth 1/2 to 1 PRN, Disp: , Rfl:     hydrOXYzine HCL (ATARAX) 25 mg tablet, TAKE 1 TABLET BY MOUTH THREE TIMES DAILY AS NEEDED FOR ITCHING, Disp: , Rfl:      Allergies     Allergies   Allergen Reactions    Meloxicam Other (See Comments)     suicidial / itching     Medical Tape Itching       Objective     /80   Pulse 72   Temp 97 7 °F (36 5 °C) (Temporal)   Resp 18   Ht 5' 7" (1 702 m)   Wt 71 7 kg (158 lb)   SpO2 98%   BMI 24 75 kg/m²      Physical Exam  Constitutional:       Appearance: He is well-developed  HENT:      Head: Normocephalic and atraumatic  Right Ear: External ear normal       Left Ear: External ear normal       Nose: Nose normal    Eyes:      General:         Right eye: No discharge  Left eye: No discharge        Conjunctiva/sclera: Conjunctivae normal       Pupils: Pupils are equal, round, and reactive to light  Neck:      Musculoskeletal: Neck supple  Thyroid: No thyromegaly  Vascular: No JVD  Cardiovascular:      Rate and Rhythm: Normal rate and regular rhythm  Heart sounds: Normal heart sounds  No murmur  Pulmonary:      Effort: Pulmonary effort is normal       Breath sounds: Normal breath sounds  No wheezing or rales  Abdominal:      General: Bowel sounds are normal       Palpations: Abdomen is soft  There is no mass  Tenderness: There is no abdominal tenderness  There is no guarding or rebound  Genitourinary:     Penis: Normal        Prostate: Normal       Rectum: Normal  Guaiac result negative  Musculoskeletal: Normal range of motion  General: No tenderness or deformity  Lymphadenopathy:      Cervical: No cervical adenopathy  Skin:     General: Skin is warm and dry  Findings: No erythema or rash  Neurological:      Mental Status: He is alert and oriented to person, place, and time  Cranial Nerves: No cranial nerve deficit  Motor: No abnormal muscle tone  Coordination: Coordination normal       Deep Tendon Reflexes: Reflexes are normal and symmetric  Psychiatric:         Behavior: Behavior normal          Thought Content:  Thought content normal          Judgment: Judgment normal            No exam data present    Health Maintenance     Health Maintenance   Topic Date Due    DTaP,Tdap,and Td Vaccines (1 - Tdap) 05/11/1969    Pneumococcal Vaccine: 65+ Years (2 of 2 - PPSV23) 12/18/2016    Fall Risk  11/09/2021    Depression Remission PHQ  11/09/2021    BMI: Adult  02/10/2022    Annual Physical  02/10/2022    Colorectal Cancer Screening  08/18/2025    Hepatitis C Screening  Completed    Influenza Vaccine  Completed    HIB Vaccine  Aged Out    Hepatitis B Vaccine  Aged Out    IPV Vaccine  Aged Out    Hepatitis A Vaccine  Aged Out    Meningococcal ACWY Vaccine Aged Out    HPV Vaccine  Aged [employer] History   Administered Date(s) Administered    Influenza, high dose seasonal 0 7 mL 10/22/2020    Pneumococcal Conjugate 13-Valent 12/18/2015       Keiko West MD  [facility]

## 2021-05-10 ENCOUNTER — OFFICE VISIT (OUTPATIENT)
Dept: FAMILY MEDICINE CLINIC | Facility: CLINIC | Age: 71
End: 2021-05-10
Payer: COMMERCIAL

## 2021-05-10 VITALS
WEIGHT: 164 LBS | BODY MASS INDEX: 25.74 KG/M2 | RESPIRATION RATE: 16 BRPM | SYSTOLIC BLOOD PRESSURE: 152 MMHG | DIASTOLIC BLOOD PRESSURE: 82 MMHG | HEIGHT: 67 IN | OXYGEN SATURATION: 97 % | HEART RATE: 84 BPM | TEMPERATURE: 97.9 F

## 2021-05-10 DIAGNOSIS — R55 NEAR SYNCOPE: Primary | ICD-10-CM

## 2021-05-10 DIAGNOSIS — E55.9 VITAMIN D DEFICIENCY: ICD-10-CM

## 2021-05-10 DIAGNOSIS — E78.2 MIXED HYPERLIPIDEMIA: ICD-10-CM

## 2021-05-10 DIAGNOSIS — R53.83 FATIGUE, UNSPECIFIED TYPE: ICD-10-CM

## 2021-05-10 PROCEDURE — 99214 OFFICE O/P EST MOD 30 MIN: CPT | Performed by: FAMILY MEDICINE

## 2021-05-10 PROCEDURE — 93000 ELECTROCARDIOGRAM COMPLETE: CPT | Performed by: FAMILY MEDICINE

## 2021-05-10 PROCEDURE — 3008F BODY MASS INDEX DOCD: CPT | Performed by: FAMILY MEDICINE

## 2021-05-10 PROCEDURE — 1036F TOBACCO NON-USER: CPT | Performed by: FAMILY MEDICINE

## 2021-05-10 PROCEDURE — 1160F RVW MEDS BY RX/DR IN RCRD: CPT | Performed by: FAMILY MEDICINE

## 2021-05-10 PROCEDURE — 3725F SCREEN DEPRESSION PERFORMED: CPT | Performed by: FAMILY MEDICINE

## 2021-05-10 NOTE — PATIENT INSTRUCTIONS
PLENTY OF FLUIDS  MONITOR SYMPTOMS - SYMPTOM DIARY  BW WILL BE OBTAINED    ECHO  EST WILL BE ORDERED  MAY NEED CARDIOLOGY CONSULT

## 2021-05-10 NOTE — PROGRESS NOTES
BMI Counseling: Body mass index is 25 69 kg/m²  The BMI is above normal  Nutrition recommendations include encouraging healthy choices of fruits and vegetables and moderation in carbohydrate intake  Exercise recommendations include exercising 3-5 times per week  No pharmacotherapy was ordered  Assessment/Plan:    No problem-specific Assessment & Plan notes found for this encounter  Diagnoses and all orders for this visit:    Near syncope  -     POCT ECG  -     Echo complete with contrast if indicated; Future  -     Stress test only, exercise; Future  -     CBC and differential  -     Comprehensive metabolic panel    Mixed hyperlipidemia    Fatigue, unspecified type  -     TSH, 3rd generation  -     CBC and differential  -     Comprehensive metabolic panel  -     Vitamin B12  -     Sedimentation rate, automated  -     C-reactive protein  -     Iron    Vitamin D deficiency  -     Vitamin D 25 hydroxy          Patient Instructions   PLENTY OF FLUIDS  MONITOR SYMPTOMS - SYMPTOM DIARY  BW WILL BE OBTAINED    ECHO  EST WILL BE ORDERED  MAY NEED CARDIOLOGY CONSULT      Return in about 2 weeks (around 5/24/2021) for Recheck  Subjective:      Patient ID: Yen Dooley is a 65 y o  male  Chief Complaint   Patient presents with    Dizziness     Pt c/o dizziness for the past few months  PATIENT STATES HE HAS BEEN HAVING INCREASED EPISODES OF DIZZINESS, LIGHT HEADINESS  EVERY SEVERAL WEEKS  LASTS SEVERAL MINUTES  [id number] [facility] FEELS LIKE HIS HEART IS POUNDING  ? VERY MILD CHEST PRESSURE AT TIMES    NOT RELATED TO EXERTION   CAN BE JUST SITTING  HAS BEEN UNDER A BIT OF STRESS THOUGH    HAS HAD A CARDIAC WORKUP MANY YEARS AGO  NO RECENT ILLNESS  NO FEVER OR CHILLS  NO NVD      Dizziness  This is a chronic problem  The current episode started more than 1 year ago  The problem occurs intermittently  The problem has been gradually worsening  Associated symptoms include chest pain   Pertinent negatives include no abdominal pain, anorexia, arthralgias, change in bowel habit, chills, congestion, coughing, diaphoresis, fatigue, fever, headaches, joint swelling, myalgias, nausea, neck pain, numbness, rash, sore throat, swollen glands, urinary symptoms, vertigo, vomiting or weakness  Nothing aggravates the symptoms  He has tried nothing for the symptoms  The following portions of the patient's history were reviewed and updated as appropriate: allergies, current medications, past family history, past medical history, past social history, past surgical history and problem list     Review of Systems   Constitutional: Negative for chills, diaphoresis, fatigue and fever  HENT: Negative for congestion, ear discharge, ear pain, mouth sores, postnasal drip, sore throat and trouble swallowing  Eyes: Negative for pain, discharge and visual disturbance  Respiratory: Negative for cough, shortness of breath and wheezing  Cardiovascular: Positive for chest pain  Negative for palpitations and leg swelling  Gastrointestinal: Negative for abdominal distention, abdominal pain, anorexia, blood in stool, change in bowel habit, diarrhea, nausea and vomiting  Endocrine: Negative for polydipsia, polyphagia and polyuria  Genitourinary: Negative for dysuria, frequency, hematuria and urgency  Musculoskeletal: Negative for arthralgias, gait problem, joint swelling, myalgias and neck pain  Skin: Negative for pallor and rash  Neurological: Positive for dizziness  Negative for vertigo, syncope, speech difficulty, weakness, light-headedness, numbness and headaches  Hematological: Negative for adenopathy  Psychiatric/Behavioral: Negative for behavioral problems, confusion and sleep disturbance  The patient is not nervous/anxious            Current Outpatient Medications   Medication Sig Dispense Refill    aspirin 325 mg tablet Take 325 mg by mouth every other day       atorvastatin (LIPITOR) 20 mg tablet Take 1 tablet (20 mg total) by mouth daily 90 tablet 3    celecoxib (CeleBREX) 200 mg capsule TK 1 C PO QD PRN  0    econazole nitrate 1 % cream Apply 1 application topically daily as needed       finasteride (PROSCAR) 5 mg tablet Take 1 tablet (5 mg total) by mouth daily 90 tablet 3    HYDROcodone-acetaminophen (XODOL) 5-300 MG per tablet TK 1 T PO Q 6 H PRN  0    hydrOXYzine HCL (ATARAX) 10 mg tablet Take 10 mg by mouth daily as needed       IRON, FERROUS SULFATE, PO Take by mouth every other day       metaxalone (SKELAXIN) 800 mg tablet Take 800 mg by mouth as needed       mometasone (NASONEX) 50 mcg/act nasal spray 1 spray into each nostril as needed (nasal congestion) 17 g 3    Naproxen Sodium (ALEVE) 220 MG CAPS Take by mouth as needed       Pseudoephedrine HCl (SUDAFED PO) Take by mouth as needed      Pseudoephedrine-guaiFENesin (MUCINEX D PO) Take by mouth as needed      sildenafil (REVATIO) 20 mg tablet Take 20 mg by mouth as needed       tamsulosin (FLOMAX) 0 4 mg Take 1 capsule (0 4 mg total) by mouth daily (Patient taking differently: Take 0 4 mg by mouth every other day ) 30 capsule 3    traMADol (ULTRAM) 50 mg tablet TK 1 T PO UP TO TID QD PRN  0    zolpidem (AMBIEN) 10 mg tablet Take 10 mg by mouth 1/2 to 1 PRN      alfuzosin (UROXATRAL) 10 mg 24 hr tablet Take 10 mg by mouth daily      hydrOXYzine HCL (ATARAX) 25 mg tablet TAKE 1 TABLET BY MOUTH THREE TIMES DAILY AS NEEDED FOR ITCHING      vitamin B-12 (CYANOCOBALAMIN) 100 MCG TABS Take 1,000 mcg by mouth daily       No current facility-administered medications for this visit  Objective:    /82 (BP Location: Right arm, Patient Position: Sitting, Cuff Size: Adult)   Pulse 84   Temp 97 9 °F (36 6 °C) (Temporal)   Resp 16   Ht 5' 7" (1 702 m)   Wt 74 4 kg (164 lb)   SpO2 97%   BMI 25 69 kg/m²        Physical Exam  Constitutional:       Appearance: He is well-developed  HENT:      Head: Normocephalic and atraumatic     Eyes: General:         Right eye: No discharge  Left eye: No discharge  Conjunctiva/sclera: Conjunctivae normal       Pupils: Pupils are equal, round, and reactive to light  Neck:      Musculoskeletal: Neck supple  Thyroid: No thyromegaly  Vascular: No JVD  Cardiovascular:      Rate and Rhythm: Normal rate and regular rhythm  Heart sounds: Normal heart sounds  No murmur  Pulmonary:      Effort: Pulmonary effort is normal       Breath sounds: Normal breath sounds  No wheezing or rales  Abdominal:      General: Bowel sounds are normal       Palpations: Abdomen is soft  There is no mass  Tenderness: There is no abdominal tenderness  There is no guarding or rebound  Musculoskeletal: Normal range of motion  General: No tenderness or deformity  Lymphadenopathy:      Cervical: No cervical adenopathy  Skin:     General: Skin is warm and dry  Findings: No erythema or rash  Neurological:      Mental Status: He is alert and oriented to person, place, and time  Psychiatric:         Behavior: Behavior normal          Thought Content:  Thought content normal          Judgment: Judgment normal                 Catia Kang MD

## 2021-05-11 ENCOUNTER — TELEPHONE (OUTPATIENT)
Dept: FAMILY MEDICINE CLINIC | Facility: CLINIC | Age: 71
End: 2021-05-11

## 2021-05-12 DIAGNOSIS — R55 NEAR SYNCOPE: Primary | ICD-10-CM

## 2021-05-12 LAB
25(OH)D3 SERPL-MCNC: 39 NG/ML (ref 30–100)
ALBUMIN SERPL-MCNC: 4.5 G/DL (ref 3.6–5.1)
ALBUMIN/GLOB SERPL: 2.3 (CALC) (ref 1–2.5)
ALP SERPL-CCNC: 89 U/L (ref 35–144)
ALT SERPL-CCNC: 15 U/L (ref 9–46)
AST SERPL-CCNC: 18 U/L (ref 10–35)
BASOPHILS # BLD AUTO: 70 CELLS/UL (ref 0–200)
BASOPHILS NFR BLD AUTO: 1.3 %
BILIRUB SERPL-MCNC: 0.6 MG/DL (ref 0.2–1.2)
BUN SERPL-MCNC: 22 MG/DL (ref 7–25)
BUN/CREAT SERPL: NORMAL (CALC) (ref 6–22)
CALCIUM SERPL-MCNC: 9.6 MG/DL (ref 8.6–10.3)
CHLORIDE SERPL-SCNC: 108 MMOL/L (ref 98–110)
CO2 SERPL-SCNC: 22 MMOL/L (ref 20–32)
CREAT SERPL-MCNC: 1.03 MG/DL (ref 0.7–1.18)
CRP SERPL-MCNC: 5.3 MG/L
EOSINOPHIL # BLD AUTO: 281 CELLS/UL (ref 15–500)
EOSINOPHIL NFR BLD AUTO: 5.2 %
ERYTHROCYTE [DISTWIDTH] IN BLOOD BY AUTOMATED COUNT: 13.3 % (ref 11–15)
ERYTHROCYTE [SEDIMENTATION RATE] IN BLOOD BY WESTERGREN METHOD: 2 MM/H
GLOBULIN SER CALC-MCNC: 2 G/DL (CALC) (ref 1.9–3.7)
GLUCOSE SERPL-MCNC: 98 MG/DL (ref 65–99)
HCT VFR BLD AUTO: 45.7 % (ref 38.5–50)
HGB BLD-MCNC: 15.7 G/DL (ref 13.2–17.1)
IRON SERPL-MCNC: 76 MCG/DL (ref 50–180)
LYMPHOCYTES # BLD AUTO: 1107 CELLS/UL (ref 850–3900)
LYMPHOCYTES NFR BLD AUTO: 20.5 %
MCH RBC QN AUTO: 30.5 PG (ref 27–33)
MCHC RBC AUTO-ENTMCNC: 34.4 G/DL (ref 32–36)
MCV RBC AUTO: 88.7 FL (ref 80–100)
MONOCYTES # BLD AUTO: 410 CELLS/UL (ref 200–950)
MONOCYTES NFR BLD AUTO: 7.6 %
NEUTROPHILS # BLD AUTO: 3532 CELLS/UL (ref 1500–7800)
NEUTROPHILS NFR BLD AUTO: 65.4 %
PLATELET # BLD AUTO: 177 THOUSAND/UL (ref 140–400)
PMV BLD REES-ECKER: 11.5 FL (ref 7.5–12.5)
POTASSIUM SERPL-SCNC: 4.3 MMOL/L (ref 3.5–5.3)
PROT SERPL-MCNC: 6.5 G/DL (ref 6.1–8.1)
RBC # BLD AUTO: 5.15 MILLION/UL (ref 4.2–5.8)
SL AMB EGFR AFRICAN AMERICAN: 84 ML/MIN/1.73M2
SL AMB EGFR NON AFRICAN AMERICAN: 72 ML/MIN/1.73M2
SODIUM SERPL-SCNC: 143 MMOL/L (ref 135–146)
TSH SERPL-ACNC: 1.17 MIU/L (ref 0.4–4.5)
VIT B12 SERPL-MCNC: 812 PG/ML (ref 200–1100)
WBC # BLD AUTO: 5.4 THOUSAND/UL (ref 3.8–10.8)

## 2021-05-12 NOTE — TELEPHONE ENCOUNTER
Here on Monday patient stated that you had spoke to him about an echo not sure not if you wanted to order the test     Shayla Castaneda LPN

## 2021-05-26 ENCOUNTER — HOSPITAL ENCOUNTER (OUTPATIENT)
Dept: RADIOLOGY | Facility: HOSPITAL | Age: 71
Discharge: HOME | End: 2021-05-26
Payer: COMMERCIAL

## 2021-05-26 DIAGNOSIS — R55 NEAR SYNCOPE: ICD-10-CM

## 2021-05-26 PROCEDURE — 93880 EXTRACRANIAL BILAT STUDY: CPT | Performed by: SURGERY

## 2021-05-26 PROCEDURE — 93880 EXTRACRANIAL BILAT STUDY: CPT

## 2021-06-01 DIAGNOSIS — N40.0 PROSTATIC HYPERTROPHY: ICD-10-CM

## 2021-06-01 DIAGNOSIS — J30.1 SEASONAL ALLERGIC RHINITIS DUE TO POLLEN: ICD-10-CM

## 2021-06-01 RX ORDER — MOMETASONE FUROATE 50 UG/1
1 SPRAY, METERED NASAL AS NEEDED
Qty: 17 G | Refills: 3 | Status: SHIPPED | OUTPATIENT
Start: 2021-06-01 | End: 2021-08-03

## 2021-06-01 RX ORDER — TAMSULOSIN HYDROCHLORIDE 0.4 MG/1
0.4 CAPSULE ORAL EVERY OTHER DAY
Qty: 90 CAPSULE | Refills: 2 | Status: SHIPPED | OUTPATIENT
Start: 2021-06-01

## 2021-06-01 NOTE — TELEPHONE ENCOUNTER
Wanted to give you an update    You requested an echo - scheduled for 6/2 at 11:00 at [facility]    Stress test 6/14/21 - through [address] [place] [address] Aj

## 2021-06-07 ENCOUNTER — TELEPHONE (OUTPATIENT)
Dept: FAMILY MEDICINE CLINIC | Facility: CLINIC | Age: 71
End: 2021-06-07

## 2021-06-07 NOTE — TELEPHONE ENCOUNTER
Lorrierodger Rodrigez got his echo and stress test results which was normal   What is the next step? See a cardiologist again? Reminder- Lorrierodger Rodrigez already sees a cardiologist for his aortic aneurysm  Please advise next step [phone]

## 2021-07-30 ENCOUNTER — TELEPHONE (OUTPATIENT)
Dept: FAMILY MEDICINE CLINIC | Facility: CLINIC | Age: 71
End: 2021-07-30

## 2021-07-30 DIAGNOSIS — Z11.52 ENCOUNTER FOR SCREENING FOR COVID-19: Primary | ICD-10-CM

## 2021-07-30 NOTE — TELEPHONE ENCOUNTER
Has had people in his house yesterday and he is not sure where and what they have been  His wife is immune compromised and he is requesting to have a covid test done  No symptoms  He is going to see people on [place] and wants to make sure he is going to be alright to be with them   (they are sick)    He would also like to have an order to have his antibodies test   Advised patient to check with insurance to see if they will cover it    He is not concerned with the cost

## 2021-07-30 NOTE — TELEPHONE ENCOUNTER
Order for Juan's covid swab and antibody test are in chart  He should wear a mask and maintain 6 ft distance unless he has been vaccinated  If he hasn't, then he should get vaccinated so that he can protect himself and those around him  THERE 14 DAYS for him to start shedding the virus so if negative today, does not mean he wont spread in 2 weeks       Indra Jacob

## 2021-08-03 ENCOUNTER — OFFICE VISIT (OUTPATIENT)
Dept: FAMILY MEDICINE CLINIC | Facility: CLINIC | Age: 71
End: 2021-08-03
Payer: COMMERCIAL

## 2021-08-03 VITALS
DIASTOLIC BLOOD PRESSURE: 82 MMHG | OXYGEN SATURATION: 96 % | RESPIRATION RATE: 16 BRPM | SYSTOLIC BLOOD PRESSURE: 132 MMHG | WEIGHT: 161 LBS | TEMPERATURE: 97.1 F | HEIGHT: 67 IN | BODY MASS INDEX: 25.27 KG/M2 | HEART RATE: 78 BPM

## 2021-08-03 DIAGNOSIS — M15.9 PRIMARY OSTEOARTHRITIS INVOLVING MULTIPLE JOINTS: ICD-10-CM

## 2021-08-03 DIAGNOSIS — G47.33 OBSTRUCTIVE SLEEP APNEA SYNDROME: ICD-10-CM

## 2021-08-03 DIAGNOSIS — J30.1 SEASONAL ALLERGIC RHINITIS DUE TO POLLEN: ICD-10-CM

## 2021-08-03 DIAGNOSIS — R21 RASH: ICD-10-CM

## 2021-08-03 DIAGNOSIS — I71.2 THORACIC AORTIC ANEURYSM WITHOUT RUPTURE (HCC): ICD-10-CM

## 2021-08-03 DIAGNOSIS — K21.9 LPRD (LARYNGOPHARYNGEAL REFLUX DISEASE): ICD-10-CM

## 2021-08-03 DIAGNOSIS — E78.2 MIXED HYPERLIPIDEMIA: Primary | ICD-10-CM

## 2021-08-03 PROCEDURE — 99214 OFFICE O/P EST MOD 30 MIN: CPT | Performed by: FAMILY MEDICINE

## 2021-08-03 PROCEDURE — 3008F BODY MASS INDEX DOCD: CPT | Performed by: FAMILY MEDICINE

## 2021-08-03 PROCEDURE — 1160F RVW MEDS BY RX/DR IN RCRD: CPT | Performed by: FAMILY MEDICINE

## 2021-08-03 PROCEDURE — 1036F TOBACCO NON-USER: CPT | Performed by: FAMILY MEDICINE

## 2021-08-03 RX ORDER — CLOTRIMAZOLE AND BETAMETHASONE DIPROPIONATE 10; .5 MG/ML; MG/ML
LOTION TOPICAL 2 TIMES DAILY
COMMUNITY
End: 2021-08-03 | Stop reason: SDUPTHER

## 2021-08-03 RX ORDER — CLOTRIMAZOLE AND BETAMETHASONE DIPROPIONATE 10; .5 MG/ML; MG/ML
LOTION TOPICAL 2 TIMES DAILY
Qty: 30 ML | Refills: 3 | Status: SHIPPED | OUTPATIENT
Start: 2021-08-03

## 2021-08-03 RX ORDER — ALBUTEROL SULFATE 90 UG/1
2 AEROSOL, METERED RESPIRATORY (INHALATION)
COMMUNITY
Start: 2021-07-22

## 2021-08-03 RX ORDER — HYDROCODONE BITARTRATE AND ACETAMINOPHEN 5; 300 MG/1; MG/1
1 TABLET ORAL EVERY 6 HOURS PRN
Qty: 15 TABLET | Refills: 0 | Status: SHIPPED | OUTPATIENT
Start: 2021-08-03

## 2021-08-03 RX ORDER — MOMETASONE FUROATE 50 UG/1
1 SPRAY, METERED NASAL AS NEEDED
Qty: 17 G | Refills: 3 | Status: SHIPPED | OUTPATIENT
Start: 2021-08-03 | End: 2022-03-24

## 2021-08-03 NOTE — PROGRESS NOTES
Assessment/Plan:    Mixed hyperlipidemia  WATCHING CHOLESTEROL INTAKE  COMPLIANT WITH MEDICATION  NO CONCERNS    - CONTINUE TO MONITOR DIETARY CHOL INTAKE  - CONTINUE CURRENT MEDICATION  - ENCOURAGED PHYSICAL ACTIVITY        Osteoarthritis  STABLE  DENIES ANY JOINT SWELLING OR REDNESS  JOINT STIFFNESS PRESENT  PAIN MANAGEMENT ADEQUATE    - CONTINUE CURRENT MANAGEMENT  - MEDICATION AS DIRECTED  - CALL / RETURN IF SYMPTOMS WORSEN      LPRD (laryngopharyngeal reflux disease)  STABLE  DENIES ANY CP, INDIGESTION, OR COUGH  NOTES NO MELENA OR HEMATOCHEZIA  NO HEMATEMESIS  COMPLIANT WITH MEDICATION  NO CONCERNS    - CONTINUE CURRENT TREATMENT PLAN  - MEDICATION AS PRESCRIBED  - AVOID CAFFEINE, ALCOHOL OR SMOKING      Thoracic aortic aneurysm without rupture (HCC)  FOLLOWED BY CARDIOLOGY  CTA PENDING       Diagnoses and all orders for this visit:    Mixed hyperlipidemia    Primary osteoarthritis involving multiple joints  -     HYDROcodone-acetaminophen (XODOL) 5-300 MG per tablet; Take 1 tablet by mouth every 6 (six) hours as needed for moderate painMax Daily Amount: 4 tablets    LPRD (laryngopharyngeal reflux disease)    Thoracic aortic aneurysm without rupture (HCC)    Obstructive sleep apnea syndrome    Rash  -     clotrimazole-betamethasone (LOTRISONE) 1-0 05 % lotion; Apply topically 2 (two) times a day    Seasonal allergic rhinitis due to pollen  -     mometasone (NASONEX) 50 mcg/act nasal spray; 1 spray into each nostril as needed (nasal congestion)    Other orders  -     albuterol (PROVENTIL HFA,VENTOLIN HFA) 90 mcg/act inhaler; Inhale 2 puffs every 4 - 6 hours as needed  -     Discontinue: clotrimazole-betamethasone (LOTRISONE) 1-0 05 % lotion;  Apply topically 2 (two) times a day  -     patient supplied medication          Patient Instructions   CONTINUE CURRENT TREATMENT PLAN  MONITOR DIETARY SODIUM, CHOL, AND CARBOHYDRATE INTAKE  ENCOURAGE PHYSICAL ACTIVITY      RV 6 M, SOONER PRN        Return in about 6 months (around 2/3/2022) for Recheck, Annual physical     Subjective:      Patient ID: Keegan Davis is a 66 y o  male  Chief Complaint   Patient presents with    Follow-up     SIX MONTH RECHECK       PATIENT RETURNS FOR ROUTINE EVALUATION OF PATIENT'S MEDICAL ISSUES    INDIVIDUAL MEDICAL ISSUES WITH THEIR CURRENT STATUS, ASSESSMENT AND PLANS ARE LISTED ABOVE          The following portions of the patient's history were reviewed and updated as appropriate: allergies, current medications, past family history, past medical history, past social history, past surgical history and problem list     Review of Systems   Constitutional: Negative for chills, fatigue and fever  HENT: Negative for congestion, ear discharge, ear pain, mouth sores, postnasal drip, sore throat and trouble swallowing  Eyes: Negative for pain, discharge and visual disturbance  Respiratory: Negative for cough, shortness of breath and wheezing  Cardiovascular: Negative for chest pain, palpitations and leg swelling  Gastrointestinal: Negative for abdominal distention, abdominal pain, blood in stool, diarrhea and nausea  Endocrine: Negative for polydipsia, polyphagia and polyuria  Genitourinary: Negative for dysuria, frequency, hematuria and urgency  Musculoskeletal: Positive for arthralgias  Negative for gait problem and joint swelling  Skin: Negative for pallor and rash  Neurological: Negative for dizziness, syncope, speech difficulty, weakness, light-headedness, numbness and headaches  Hematological: Negative for adenopathy  Psychiatric/Behavioral: Negative for behavioral problems, confusion and sleep disturbance  The patient is nervous/anxious            Current Outpatient Medications   Medication Sig Dispense Refill    albuterol (PROVENTIL HFA,VENTOLIN HFA) 90 mcg/act inhaler Inhale 2 puffs every 4 - 6 hours as needed      alfuzosin (UROXATRAL) 10 mg 24 hr tablet Take 10 mg by mouth daily      aspirin 325 mg tablet Take 325 mg by mouth every other day       atorvastatin (LIPITOR) 20 mg tablet Take 1 tablet (20 mg total) by mouth daily 90 tablet 3    clotrimazole-betamethasone (LOTRISONE) 1-0 05 % lotion Apply topically 2 (two) times a day 30 mL 3    finasteride (PROSCAR) 5 mg tablet Take 1 tablet (5 mg total) by mouth daily 90 tablet 3    HYDROcodone-acetaminophen (XODOL) 5-300 MG per tablet Take 1 tablet by mouth every 6 (six) hours as needed for moderate painMax Daily Amount: 4 tablets 15 tablet 0    hydrOXYzine HCL (ATARAX) 10 mg tablet Take 10 mg by mouth daily as needed       IRON, FERROUS SULFATE, PO Take by mouth every other day       metaxalone (SKELAXIN) 800 mg tablet Take 800 mg by mouth as needed       Naproxen Sodium (ALEVE) 220 MG CAPS Take by mouth as needed       patient supplied medication       Pseudoephedrine HCl (SUDAFED PO) Take by mouth as needed      Pseudoephedrine-guaiFENesin (MUCINEX D PO) Take by mouth as needed      tamsulosin (FLOMAX) 0 4 mg Take 1 capsule (0 4 mg total) by mouth every other day 90 capsule 2    traMADol (ULTRAM) 50 mg tablet TK 1 T PO UP TO TID QD PRN  0    vitamin B-12 (CYANOCOBALAMIN) 100 MCG TABS Take 1,000 mcg by mouth daily      zolpidem (AMBIEN) 10 mg tablet Take 10 mg by mouth 1/2 to 1 PRN      mometasone (NASONEX) 50 mcg/act nasal spray 1 spray into each nostril as needed (nasal congestion) 17 g 3     No current facility-administered medications for this visit  Objective:    /82   Pulse 78   Temp (!) 97 1 °F (36 2 °C) (Temporal)   Resp 16   Ht 5' 7" (1 702 m)   Wt 73 kg (161 lb)   SpO2 96%   BMI 25 22 kg/m²        Physical Exam  Constitutional:       Appearance: He is well-developed  HENT:      Head: Normocephalic and atraumatic  Eyes:      General:         Right eye: No discharge  Left eye: No discharge  Conjunctiva/sclera: Conjunctivae normal       Pupils: Pupils are equal, round, and reactive to light     Neck: Thyroid: No thyromegaly  Vascular: No JVD  Cardiovascular:      Rate and Rhythm: Normal rate and regular rhythm  Heart sounds: Normal heart sounds  No murmur heard  Pulmonary:      Effort: Pulmonary effort is normal       Breath sounds: Normal breath sounds  No wheezing or rales  Abdominal:      General: Bowel sounds are normal       Palpations: Abdomen is soft  There is no mass  Tenderness: There is no abdominal tenderness  There is no guarding or rebound  Musculoskeletal:         General: No tenderness or deformity  Normal range of motion  Cervical back: Neck supple  Comments: MODERATE DJD CHANGES     Lymphadenopathy:      Cervical: No cervical adenopathy  Skin:     General: Skin is warm and dry  Findings: No erythema or rash  Neurological:      Mental Status: He is alert and oriented to person, place, and time  Psychiatric:         Behavior: Behavior normal          Thought Content:  Thought content normal          Judgment: Judgment normal                 Frederick Thurman MD

## 2021-08-04 PROCEDURE — U0005 INFEC AGEN DETEC AMPLI PROBE: HCPCS | Performed by: NURSE PRACTITIONER

## 2021-08-04 PROCEDURE — U0003 INFECTIOUS AGENT DETECTION BY NUCLEIC ACID (DNA OR RNA); SEVERE ACUTE RESPIRATORY SYNDROME CORONAVIRUS 2 (SARS-COV-2) (CORONAVIRUS DISEASE [COVID-19]), AMPLIFIED PROBE TECHNIQUE, MAKING USE OF HIGH THROUGHPUT TECHNOLOGIES AS DESCRIBED BY CMS-2020-01-R: HCPCS | Performed by: NURSE PRACTITIONER

## 2021-08-05 LAB
SARS-COV-2 IGG SERPL QL IA: NEGATIVE
SARS-COV-2 IGM SERPL QL IA: NEGATIVE

## 2021-08-16 ENCOUNTER — TELEPHONE (OUTPATIENT)
Dept: FAMILY MEDICINE CLINIC | Facility: CLINIC | Age: 71
End: 2021-08-16

## 2021-08-17 ENCOUNTER — TELEMEDICINE (OUTPATIENT)
Dept: FAMILY MEDICINE CLINIC | Facility: CLINIC | Age: 71
End: 2021-08-17
Payer: COMMERCIAL

## 2021-08-17 VITALS — HEIGHT: 67 IN | WEIGHT: 161 LBS | BODY MASS INDEX: 25.27 KG/M2 | HEART RATE: 81 BPM

## 2021-08-17 DIAGNOSIS — F34.1 DYSTHYMIA: ICD-10-CM

## 2021-08-17 DIAGNOSIS — Z63.8 FAMILY DISCORD: Primary | ICD-10-CM

## 2021-08-17 PROCEDURE — 1160F RVW MEDS BY RX/DR IN RCRD: CPT | Performed by: FAMILY MEDICINE

## 2021-08-17 PROCEDURE — 1036F TOBACCO NON-USER: CPT | Performed by: FAMILY MEDICINE

## 2021-08-17 PROCEDURE — 99214 OFFICE O/P EST MOD 30 MIN: CPT | Performed by: FAMILY MEDICINE

## 2021-08-17 PROCEDURE — 3008F BODY MASS INDEX DOCD: CPT | Performed by: FAMILY MEDICINE

## 2021-08-17 RX ORDER — CELECOXIB 200 MG/1
200 CAPSULE ORAL DAILY PRN
COMMUNITY
End: 2022-04-26 | Stop reason: SDUPTHER

## 2021-08-17 SDOH — SOCIAL STABILITY - SOCIAL INSECURITY: OTHER SPECIFIED PROBLEMS RELATED TO PRIMARY SUPPORT GROUP: Z63.8

## 2021-08-17 NOTE — PROGRESS NOTES
Virtual Regular Visit    Verification of patient location:    Patient is located in the following state in which I hold an active license [place]      Assessment/Plan:    Problem List Items Addressed This Visit        Other    Dysthymia    Family discord - Primary               Reason for visit is   Chief Complaint   Patient presents with    Medication Management     Pts pain medication that we recently prescribed is almost gone due to his daughter taking them  the police have been notified  Pts BP is 156/89   Virtual Regular Visit        Encounter provider Paluina Mathias MD    Provider located at [address] [place], [place] [id number]      Recent Visits  Date Type Provider Dept   08/16/21 Telephone [address] [place] [address] Physicians   Showing recent visits within past 7 days and meeting all other requirements  Today's Visits  Date Type Provider Dept   08/17/21 Telemedicine Paulina Mathias MD [facility]   Showing today's visits and meeting all other requirements  Future Appointments  No visits were found meeting these conditions  Showing future appointments within next 150 days and meeting all other requirements       The patient was identified by name and date of birth  Terry Elkins was informed that this is a telemedicine visit and that the visit is being conducted through [address] [place] [address] and patient was informed that this is not a secure, HIPAA-compliant platform  He agrees to proceed     My office door was closed  No one else was in the room  He acknowledged consent and understanding of privacy and security of the video platform  The patient has agreed to participate and understands they can discontinue the visit at any time  Patient is aware this is a billable service  Subjective  Terry Elkins is a 66 y o  male            DISCUSSION    PATIENT VERY CONCERNED ABOUT HIS DAUGHTER  APPEARS TO HAVE A SIGNIFICANT DRUG PROBLEM  RECENTLY STOLE MEDICATION FROM HIM    REVIEWED ISSUES AT LENGTH  PROFESSIONAL RESOURCES WERE DISCUSSED  SUPPORT GIVEN       History reviewed  No pertinent past medical history      Past Surgical History:   Procedure Laterality Date    HIP SURGERY Left     NISHA    HIP SURGERY Right     revision    KIDNEY STONE SURGERY      KNEE SURGERY Right     KNEE SURGERY Right     ligament    KNEE SURGERY Right     ligament    NASAL SINUS SURGERY      TONSILECTOMY AND ADNOIDECTOMY      TOTAL HIP ARTHROPLASTY Right        Current Outpatient Medications   Medication Sig Dispense Refill    albuterol (PROVENTIL HFA,VENTOLIN HFA) 90 mcg/act inhaler Inhale 2 puffs every 4 - 6 hours as needed      alfuzosin (UROXATRAL) 10 mg 24 hr tablet Take 10 mg by mouth daily      AMOXICILLIN PO Take by mouth as needed      aspirin 325 mg tablet Take 325 mg by mouth every other day       atorvastatin (LIPITOR) 20 mg tablet Take 1 tablet (20 mg total) by mouth daily 90 tablet 3    celecoxib (CeleBREX) 200 mg capsule Take 200 mg by mouth daily as needed for mild pain      Cholecalciferol (VITAMIN D3 PO) Take 2,000 Units by mouth      clotrimazole-betamethasone (LOTRISONE) 1-0 05 % lotion Apply topically 2 (two) times a day (Patient taking differently: Apply topically 2 (two) times a day as needed ) 30 mL 3    finasteride (PROSCAR) 5 mg tablet Take 1 tablet (5 mg total) by mouth daily 90 tablet 3    HYDROcodone-acetaminophen (XODOL) 5-300 MG per tablet Take 1 tablet by mouth every 6 (six) hours as needed for moderate painMax Daily Amount: 4 tablets 15 tablet 0    hydrOXYzine HCL (ATARAX) 10 mg tablet Take 10 mg by mouth daily as needed       IRON, FERROUS SULFATE, PO Take by mouth every other day       metaxalone (SKELAXIN) 800 mg tablet Take 800 mg by mouth as needed       mometasone (NASONEX) 50 mcg/act nasal spray 1 spray into each nostril as needed (nasal congestion) 17 g 3    Naproxen Sodium (ALEVE) 220 MG CAPS Take by mouth as needed       Pseudoephedrine HCl (SUDAFED PO) Take by mouth as needed      Pseudoephedrine-guaiFENesin (MUCINEX D PO) Take by mouth as needed      tamsulosin (FLOMAX) 0 4 mg Take 1 capsule (0 4 mg total) by mouth every other day 90 capsule 2    traMADol (ULTRAM) 50 mg tablet TK 1 T PO UP TO TID QD PRN  0    vitamin B-12 (CYANOCOBALAMIN) 100 MCG TABS Take 1,000 mcg by mouth daily      zolpidem (AMBIEN) 10 mg tablet Take 10 mg by mouth 1/2 to 1 PRN       No current facility-administered medications for this visit  Allergies   Allergen Reactions    Meloxicam Other (See Comments)     suicidial / itching     Medical Tape Itching       Review of Systems   Constitutional: Negative for chills, fatigue and fever  HENT: Negative for congestion, ear discharge, ear pain, mouth sores, postnasal drip, sore throat and trouble swallowing  Eyes: Negative for pain, discharge and visual disturbance  Respiratory: Negative for cough, shortness of breath and wheezing  Cardiovascular: Negative for chest pain, palpitations and leg swelling  Gastrointestinal: Negative for abdominal distention, abdominal pain, blood in stool, diarrhea and nausea  Endocrine: Negative for polydipsia, polyphagia and polyuria  Genitourinary: Negative for dysuria, frequency, hematuria and urgency  Musculoskeletal: Negative for arthralgias, gait problem and joint swelling  Skin: Negative for pallor and rash  Neurological: Negative for dizziness, syncope, speech difficulty, weakness, light-headedness, numbness and headaches  Hematological: Negative for adenopathy  Psychiatric/Behavioral: Positive for dysphoric mood  Negative for behavioral problems, confusion and sleep disturbance  The patient is nervous/anxious          Video Exam    Vitals:    08/17/21 1025   Pulse: 81   Weight: 73 kg (161 lb)   Height: 5' 7" (1 702 m)       Physical Exam     PATIENT VISUALLY APPEARS WELL IN NO OBVIOUS DISTRESS      I spent 30 minutes directly with the patient during this visit    VIRTUAL VISIT DISCLAIMER      Renetta Pavon verbally agrees to participate in [facility]  Pt is aware that Virtual Care Services could be limited without vital signs or the ability to perform a full hands-on physical exam  Juan Vargas understands he or the provider may request at any time to terminate the video visit and request the patient to seek care or treatment in person

## 2021-12-02 ENCOUNTER — NURSE TRIAGE (OUTPATIENT)
Dept: OTHER | Facility: OTHER | Age: 71
End: 2021-12-02

## 2021-12-02 DIAGNOSIS — Z20.822 ENCOUNTER FOR SCREENING LABORATORY TESTING FOR COVID-19 VIRUS: Primary | ICD-10-CM

## 2021-12-03 PROCEDURE — U0005 INFEC AGEN DETEC AMPLI PROBE: HCPCS | Performed by: FAMILY MEDICINE

## 2021-12-03 PROCEDURE — U0003 INFECTIOUS AGENT DETECTION BY NUCLEIC ACID (DNA OR RNA); SEVERE ACUTE RESPIRATORY SYNDROME CORONAVIRUS 2 (SARS-COV-2) (CORONAVIRUS DISEASE [COVID-19]), AMPLIFIED PROBE TECHNIQUE, MAKING USE OF HIGH THROUGHPUT TECHNOLOGIES AS DESCRIBED BY CMS-2020-01-R: HCPCS | Performed by: FAMILY MEDICINE

## 2021-12-17 ENCOUNTER — RA CDI HCC (OUTPATIENT)
Dept: OTHER | Facility: HOSPITAL | Age: 71
End: 2021-12-17

## 2021-12-20 RX ORDER — AMLODIPINE BESYLATE 5 MG/1
5 TABLET ORAL DAILY
COMMUNITY
Start: 2021-11-20

## 2021-12-23 ENCOUNTER — TELEPHONE (OUTPATIENT)
Dept: ADMINISTRATIVE | Facility: OTHER | Age: 71
End: 2021-12-23

## 2021-12-23 ENCOUNTER — OFFICE VISIT (OUTPATIENT)
Dept: FAMILY MEDICINE CLINIC | Facility: CLINIC | Age: 71
End: 2021-12-23
Payer: COMMERCIAL

## 2021-12-23 VITALS
DIASTOLIC BLOOD PRESSURE: 78 MMHG | TEMPERATURE: 97.7 F | HEART RATE: 92 BPM | OXYGEN SATURATION: 97 % | HEIGHT: 67 IN | WEIGHT: 158 LBS | BODY MASS INDEX: 24.8 KG/M2 | RESPIRATION RATE: 12 BRPM | SYSTOLIC BLOOD PRESSURE: 110 MMHG

## 2021-12-23 DIAGNOSIS — K21.9 LPRD (LARYNGOPHARYNGEAL REFLUX DISEASE): ICD-10-CM

## 2021-12-23 DIAGNOSIS — R05.3 PERSISTENT COUGH FOR 3 WEEKS OR LONGER: Primary | ICD-10-CM

## 2021-12-23 DIAGNOSIS — M15.9 PRIMARY OSTEOARTHRITIS INVOLVING MULTIPLE JOINTS: ICD-10-CM

## 2021-12-23 DIAGNOSIS — E78.2 MIXED HYPERLIPIDEMIA: ICD-10-CM

## 2021-12-23 DIAGNOSIS — J31.0 CHRONIC RHINITIS: ICD-10-CM

## 2021-12-23 DIAGNOSIS — F11.20 CONTINUOUS OPIOID DEPENDENCE (HCC): ICD-10-CM

## 2021-12-23 DIAGNOSIS — M25.552 HIP PAIN, BILATERAL: ICD-10-CM

## 2021-12-23 DIAGNOSIS — M25.551 HIP PAIN, BILATERAL: ICD-10-CM

## 2021-12-23 PROCEDURE — 1160F RVW MEDS BY RX/DR IN RCRD: CPT | Performed by: FAMILY MEDICINE

## 2021-12-23 PROCEDURE — 3288F FALL RISK ASSESSMENT DOCD: CPT | Performed by: FAMILY MEDICINE

## 2021-12-23 PROCEDURE — 1101F PT FALLS ASSESS-DOCD LE1/YR: CPT | Performed by: FAMILY MEDICINE

## 2021-12-23 PROCEDURE — 99214 OFFICE O/P EST MOD 30 MIN: CPT | Performed by: FAMILY MEDICINE

## 2021-12-23 PROCEDURE — 3008F BODY MASS INDEX DOCD: CPT | Performed by: FAMILY MEDICINE

## 2021-12-23 RX ORDER — DOXYCYCLINE HYCLATE 100 MG
100 TABLET ORAL 2 TIMES DAILY
Qty: 28 TABLET | Refills: 0 | Status: SHIPPED | OUTPATIENT
Start: 2021-12-23 | End: 2022-01-06

## 2021-12-23 RX ORDER — AMOXICILLIN AND CLAVULANATE POTASSIUM 500; 125 MG/1; MG/1
1 TABLET, FILM COATED ORAL EVERY 12 HOURS
COMMUNITY
Start: 2021-12-06 | End: 2021-12-23 | Stop reason: ALTCHOICE

## 2021-12-23 RX ORDER — PREDNISONE 20 MG/1
TABLET ORAL
Qty: 14 TABLET | Refills: 0 | Status: SHIPPED | OUTPATIENT
Start: 2021-12-23 | End: 2022-02-11 | Stop reason: ALTCHOICE

## 2022-01-13 ENCOUNTER — OFFICE VISIT (OUTPATIENT)
Dept: FAMILY MEDICINE CLINIC | Facility: CLINIC | Age: 72
End: 2022-01-13
Payer: COMMERCIAL

## 2022-01-13 VITALS
HEART RATE: 77 BPM | WEIGHT: 159 LBS | BODY MASS INDEX: 24.96 KG/M2 | RESPIRATION RATE: 16 BRPM | OXYGEN SATURATION: 95 % | TEMPERATURE: 97.1 F | HEIGHT: 67 IN | DIASTOLIC BLOOD PRESSURE: 66 MMHG | SYSTOLIC BLOOD PRESSURE: 112 MMHG

## 2022-01-13 DIAGNOSIS — R05.3 PERSISTENT COUGH FOR 3 WEEKS OR LONGER: Primary | ICD-10-CM

## 2022-01-13 DIAGNOSIS — E78.01 FAMILIAL HYPERCHOLESTEROLEMIA: ICD-10-CM

## 2022-01-13 DIAGNOSIS — M15.9 PRIMARY OSTEOARTHRITIS INVOLVING MULTIPLE JOINTS: ICD-10-CM

## 2022-01-13 DIAGNOSIS — E78.2 MIXED HYPERLIPIDEMIA: ICD-10-CM

## 2022-01-13 DIAGNOSIS — K21.9 LPRD (LARYNGOPHARYNGEAL REFLUX DISEASE): ICD-10-CM

## 2022-01-13 DIAGNOSIS — N40.0 PROSTATIC HYPERTROPHY: ICD-10-CM

## 2022-01-13 PROCEDURE — 99214 OFFICE O/P EST MOD 30 MIN: CPT | Performed by: FAMILY MEDICINE

## 2022-01-13 PROCEDURE — 3008F BODY MASS INDEX DOCD: CPT | Performed by: FAMILY MEDICINE

## 2022-01-13 PROCEDURE — 1036F TOBACCO NON-USER: CPT | Performed by: FAMILY MEDICINE

## 2022-01-13 PROCEDURE — 1160F RVW MEDS BY RX/DR IN RCRD: CPT | Performed by: FAMILY MEDICINE

## 2022-01-13 RX ORDER — FINASTERIDE 5 MG/1
TABLET, FILM COATED ORAL
Qty: 90 TABLET | Refills: 3 | Status: SHIPPED | OUTPATIENT
Start: 2022-01-13

## 2022-01-13 RX ORDER — OMEPRAZOLE 40 MG/1
40 CAPSULE, DELAYED RELEASE ORAL DAILY
Qty: 30 CAPSULE | Refills: 1 | Status: SHIPPED | OUTPATIENT
Start: 2022-01-13 | End: 2022-03-17

## 2022-01-13 RX ORDER — ATORVASTATIN CALCIUM 20 MG/1
TABLET, FILM COATED ORAL
Qty: 90 TABLET | Refills: 3 | Status: SHIPPED | OUTPATIENT
Start: 2022-01-13

## 2022-01-13 NOTE — PROGRESS NOTES
Assessment/Plan:    Osteoarthritis  STABLE  DENIES ANY JOINT SWELLING OR REDNESS  JOINT STIFFNESS PRESENT  PAIN MANAGEMENT ADEQUATE    - CONTINUE CURRENT MANAGEMENT  - MEDICATION AS DIRECTED  - CALL / RETURN IF SYMPTOMS WORSEN      Mixed hyperlipidemia  WATCHING CHOLESTEROL INTAKE  COMPLIANT WITH MEDICATION  NO CONCERNS    - CONTINUE TO MONITOR DIETARY CHOL INTAKE  - CONTINUE CURRENT MEDICATION  - ENCOURAGED PHYSICAL ACTIVITY        Persistent cough for 3 weeks or longer  NOT REALLY IMPROVED  CONTINUE PULMONARY FOLLOW UP    TRIAL OF OMEPRAZOLE         Diagnoses and all orders for this visit:    Persistent cough for 3 weeks or longer  -     XR chest pa & lateral; Future  -     omeprazole (PriLOSEC) 40 MG capsule; Take 1 capsule (40 mg total) by mouth daily    LPRD (laryngopharyngeal reflux disease)  -     XR chest pa & lateral; Future  -     omeprazole (PriLOSEC) 40 MG capsule; Take 1 capsule (40 mg total) by mouth daily    Primary osteoarthritis involving multiple joints    Mixed hyperlipidemia          Patient Instructions   CONTINUE CURRENT TREATMENT PLAN  TRIAL OF OMEPRAZOLE    CXR    RV 6 WEEKS, SOONER PRN      Return in about 6 weeks (around 2/24/2022) for Recheck  Subjective:      Patient ID: Ashley Palma is a 66 y o  male  Chief Complaint   Patient presents with    Follow-up     THREE WEEK       PATIENT RETURNS FOR ROUTINE EVALUATION OF PATIENT'S MEDICAL ISSUES    INDIVIDUAL MEDICAL ISSUES WITH THEIR CURRENT STATUS, ASSESSMENT AND PLANS ARE LISTED ABOVE          The following portions of the patient's history were reviewed and updated as appropriate: allergies, current medications, past family history, past medical history, past social history, past surgical history and problem list     Review of Systems   Constitutional: Negative for chills, fatigue and fever  HENT: Negative for congestion, ear discharge, ear pain, mouth sores, postnasal drip, sore throat and trouble swallowing      Eyes: Negative for pain, discharge and visual disturbance  Respiratory: Positive for cough  Negative for shortness of breath and wheezing  Cardiovascular: Negative for chest pain, palpitations and leg swelling  Gastrointestinal: Negative for abdominal distention, abdominal pain, blood in stool, diarrhea and nausea  Endocrine: Negative for polydipsia, polyphagia and polyuria  Genitourinary: Negative for dysuria, frequency, hematuria and urgency  Musculoskeletal: Negative for arthralgias, gait problem and joint swelling  Skin: Negative for pallor and rash  Neurological: Negative for dizziness, syncope, speech difficulty, weakness, light-headedness, numbness and headaches  Hematological: Negative for adenopathy  Psychiatric/Behavioral: Negative for behavioral problems, confusion and sleep disturbance  The patient is not nervous/anxious            Current Outpatient Medications   Medication Sig Dispense Refill    albuterol (PROVENTIL HFA,VENTOLIN HFA) 90 mcg/act inhaler Inhale 2 puffs every 4 - 6 hours as needed      amLODIPine (NORVASC) 5 mg tablet Take 5 mg by mouth daily      AMOXICILLIN PO Take by mouth as needed      aspirin 325 mg tablet Take 325 mg by mouth daily        atorvastatin (LIPITOR) 20 mg tablet Take 1 tablet (20 mg total) by mouth daily 90 tablet 3    celecoxib (CeleBREX) 200 mg capsule Take 200 mg by mouth daily as needed for mild pain      Cholecalciferol (VITAMIN D3 PO) Take 2,000 Units by mouth      clotrimazole-betamethasone (LOTRISONE) 1-0 05 % lotion Apply topically 2 (two) times a day (Patient taking differently: Apply topically 2 (two) times a day as needed ) 30 mL 3    finasteride (PROSCAR) 5 mg tablet Take 1 tablet (5 mg total) by mouth daily 90 tablet 3    HYDROcodone-acetaminophen (XODOL) 5-300 MG per tablet Take 1 tablet by mouth every 6 (six) hours as needed for moderate painMax Daily Amount: 4 tablets 15 tablet 0    hydrOXYzine HCL (ATARAX) 10 mg tablet Take 10 mg by mouth daily as needed       IRON, FERROUS SULFATE, PO Take by mouth every other day       metaxalone (SKELAXIN) 800 mg tablet Take 800 mg by mouth as needed       mometasone (NASONEX) 50 mcg/act nasal spray 1 spray into each nostril as needed (nasal congestion) 17 g 3    Naproxen Sodium (ALEVE) 220 MG CAPS Take by mouth as needed       predniSONE 20 mg tablet 2 PO QD X 4 DAYS, THEN 1 PO QD X 4 DAYS, THEN 1/2 PO QD X 4 DAYS 14 tablet 0    Pseudoephedrine HCl (SUDAFED PO) Take by mouth as needed      Pseudoephedrine-guaiFENesin (MUCINEX D PO) Take by mouth as needed      traMADol (ULTRAM) 50 mg tablet TK 1 T PO UP TO TID QD PRN  0    vitamin B-12 (CYANOCOBALAMIN) 100 MCG TABS Take 2,000 mcg by mouth once a week        zolpidem (AMBIEN) 10 mg tablet Take 10 mg by mouth 1/2 to 1 PRN      omeprazole (PriLOSEC) 40 MG capsule Take 1 capsule (40 mg total) by mouth daily 30 capsule 1    tamsulosin (FLOMAX) 0 4 mg Take 1 capsule (0 4 mg total) by mouth every other day 90 capsule 2     No current facility-administered medications for this visit  Objective:    /66   Pulse 77   Temp (!) 97 1 °F (36 2 °C) (Temporal)   Resp 16   Ht 5' 7" (1 702 m)   Wt 72 1 kg (159 lb)   SpO2 95%   BMI 24 90 kg/m²        Physical Exam  Constitutional:       Appearance: He is well-developed  HENT:      Head: Normocephalic and atraumatic  Eyes:      General:         Right eye: No discharge  Left eye: No discharge  Conjunctiva/sclera: Conjunctivae normal       Pupils: Pupils are equal, round, and reactive to light  Neck:      Thyroid: No thyromegaly  Vascular: No JVD  Cardiovascular:      Rate and Rhythm: Normal rate and regular rhythm  Heart sounds: Normal heart sounds  No murmur heard  Pulmonary:      Effort: Pulmonary effort is normal       Breath sounds: Normal breath sounds  No wheezing or rales     Abdominal:      General: Bowel sounds are normal       Palpations: Abdomen is soft  There is no mass  Tenderness: There is no abdominal tenderness  There is no guarding or rebound  Musculoskeletal:         General: No tenderness or deformity  Normal range of motion  Cervical back: Neck supple  Lymphadenopathy:      Cervical: No cervical adenopathy  Skin:     General: Skin is warm and dry  Findings: No erythema or rash  Neurological:      Mental Status: He is alert and oriented to person, place, and time  Psychiatric:         Behavior: Behavior normal          Thought Content:  Thought content normal          Judgment: Judgment normal                 Viviana Benjamin MD

## 2022-01-21 DIAGNOSIS — E78.2 MIXED HYPERLIPIDEMIA: ICD-10-CM

## 2022-01-21 DIAGNOSIS — Z13.29 SCREENING FOR THYROID DISORDER: ICD-10-CM

## 2022-01-21 DIAGNOSIS — E78.01 FAMILIAL HYPERCHOLESTEROLEMIA: ICD-10-CM

## 2022-01-21 DIAGNOSIS — Z13.6 SCREENING FOR HYPERTENSION: ICD-10-CM

## 2022-01-21 DIAGNOSIS — Z12.5 ENCOUNTER FOR PROSTATE CANCER SCREENING: ICD-10-CM

## 2022-01-21 DIAGNOSIS — Z00.00 ROUTINE GENERAL MEDICAL EXAMINATION AT A HEALTH CARE FACILITY: Primary | ICD-10-CM

## 2022-01-21 DIAGNOSIS — N40.0 PROSTATIC HYPERTROPHY: ICD-10-CM

## 2022-01-27 ENCOUNTER — SOCIAL WORK (OUTPATIENT)
Dept: BEHAVIORAL/MENTAL HEALTH CLINIC | Facility: CLINIC | Age: 72
End: 2022-01-27
Payer: COMMERCIAL

## 2022-01-27 DIAGNOSIS — F90.1 ADHD, HYPERACTIVE-IMPULSIVE TYPE: Primary | ICD-10-CM

## 2022-01-27 PROCEDURE — 90791 PSYCH DIAGNOSTIC EVALUATION: CPT | Performed by: SOCIAL WORKER

## 2022-01-31 ENCOUNTER — TELEPHONE (OUTPATIENT)
Dept: FAMILY MEDICINE CLINIC | Facility: CLINIC | Age: 72
End: 2022-01-31

## 2022-01-31 PROBLEM — E53.8 B12 DEFICIENCY: Status: ACTIVE | Noted: 2021-10-17

## 2022-01-31 NOTE — TELEPHONE ENCOUNTER
Would like to have additional bw added onto his cpx bw    He is requesting Cobalt,chromium, iron & B12    Explained that these tests may not be paid by insurance, patient understood    [facility]    Call when ready, he will
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
## 2022-02-07 ENCOUNTER — RA CDI HCC (OUTPATIENT)
Dept: OTHER | Facility: HOSPITAL | Age: 72
End: 2022-02-07

## 2022-02-07 NOTE — PROGRESS NOTES
[facility]  coding opportunities       Chart reviewed, no opportunity found: CHART REVIEWED, NO OPPORTUNITY FOUND                        Patients insurance company: [facility] [address]  (Medicare Advantage and Commercial)

## 2022-02-10 ENCOUNTER — SOCIAL WORK (OUTPATIENT)
Dept: BEHAVIORAL/MENTAL HEALTH CLINIC | Facility: CLINIC | Age: 72
End: 2022-02-10
Payer: COMMERCIAL

## 2022-02-10 DIAGNOSIS — F43.22 ADJUSTMENT DISORDER WITH ANXIETY: ICD-10-CM

## 2022-02-10 DIAGNOSIS — F90.1 ADHD, HYPERACTIVE-IMPULSIVE TYPE: Primary | ICD-10-CM

## 2022-02-10 PROCEDURE — 90834 PSYTX W PT 45 MINUTES: CPT | Performed by: SOCIAL WORKER

## 2022-02-11 ENCOUNTER — OFFICE VISIT (OUTPATIENT)
Dept: FAMILY MEDICINE CLINIC | Facility: CLINIC | Age: 72
End: 2022-02-11
Payer: COMMERCIAL

## 2022-02-11 VITALS
OXYGEN SATURATION: 97 % | TEMPERATURE: 97.6 F | SYSTOLIC BLOOD PRESSURE: 128 MMHG | RESPIRATION RATE: 12 BRPM | DIASTOLIC BLOOD PRESSURE: 80 MMHG | HEART RATE: 84 BPM | WEIGHT: 159 LBS | HEIGHT: 66 IN | BODY MASS INDEX: 25.55 KG/M2

## 2022-02-11 DIAGNOSIS — Z12.11 COLON CANCER SCREENING: ICD-10-CM

## 2022-02-11 DIAGNOSIS — J30.1 SEASONAL ALLERGIC RHINITIS DUE TO POLLEN: ICD-10-CM

## 2022-02-11 DIAGNOSIS — E78.2 MIXED HYPERLIPIDEMIA: ICD-10-CM

## 2022-02-11 DIAGNOSIS — J31.0 CHRONIC RHINITIS: ICD-10-CM

## 2022-02-11 DIAGNOSIS — R05.3 PERSISTENT COUGH FOR 3 WEEKS OR LONGER: ICD-10-CM

## 2022-02-11 DIAGNOSIS — R03.0 ELEVATED BLOOD-PRESSURE READING WITHOUT DIAGNOSIS OF HYPERTENSION: ICD-10-CM

## 2022-02-11 DIAGNOSIS — K21.9 LPRD (LARYNGOPHARYNGEAL REFLUX DISEASE): ICD-10-CM

## 2022-02-11 DIAGNOSIS — Z12.5 ENCOUNTER FOR PROSTATE CANCER SCREENING: ICD-10-CM

## 2022-02-11 DIAGNOSIS — Z00.00 ROUTINE GENERAL MEDICAL EXAMINATION AT A HEALTH CARE FACILITY: Primary | ICD-10-CM

## 2022-02-11 DIAGNOSIS — N40.0 BENIGN PROSTATIC HYPERPLASIA, UNSPECIFIED WHETHER LOWER URINARY TRACT SYMPTOMS PRESENT: ICD-10-CM

## 2022-02-11 DIAGNOSIS — M15.9 PRIMARY OSTEOARTHRITIS INVOLVING MULTIPLE JOINTS: ICD-10-CM

## 2022-02-11 PROBLEM — M51.36 DDD (DEGENERATIVE DISC DISEASE), LUMBAR: Status: ACTIVE | Noted: 2021-10-17

## 2022-02-11 PROBLEM — I70.90 ATHEROSCLEROSIS: Status: ACTIVE | Noted: 2019-02-08

## 2022-02-11 PROBLEM — M51.369 DDD (DEGENERATIVE DISC DISEASE), LUMBAR: Status: ACTIVE | Noted: 2021-10-17

## 2022-02-11 LAB
SL AMB  POCT GLUCOSE, UA: NORMAL
SL AMB LEUKOCYTE ESTERASE,UA: ABNORMAL
SL AMB POCT BILIRUBIN,UA: 1
SL AMB POCT BLOOD,UA: ABNORMAL
SL AMB POCT CLARITY,UA: CLEAR
SL AMB POCT COLOR,UA: YELLOW
SL AMB POCT KETONES,UA: ABNORMAL
SL AMB POCT NITRITE,UA: ABNORMAL
SL AMB POCT PH,UA: 6
SL AMB POCT SPECIFIC GRAVITY,UA: 1.02
SL AMB POCT URINE PROTEIN: ABNORMAL
SL AMB POCT UROBILINOGEN: 1

## 2022-02-11 PROCEDURE — 1160F RVW MEDS BY RX/DR IN RCRD: CPT | Performed by: FAMILY MEDICINE

## 2022-02-11 PROCEDURE — 3725F SCREEN DEPRESSION PERFORMED: CPT | Performed by: FAMILY MEDICINE

## 2022-02-11 PROCEDURE — 1036F TOBACCO NON-USER: CPT | Performed by: FAMILY MEDICINE

## 2022-02-11 PROCEDURE — 99397 PER PM REEVAL EST PAT 65+ YR: CPT | Performed by: FAMILY MEDICINE

## 2022-02-11 PROCEDURE — 81003 URINALYSIS AUTO W/O SCOPE: CPT | Performed by: FAMILY MEDICINE

## 2022-02-11 PROCEDURE — 3008F BODY MASS INDEX DOCD: CPT | Performed by: FAMILY MEDICINE

## 2022-02-11 NOTE — PROGRESS NOTES
BMI Counseling: Body mass index is 25 66 kg/m²  The BMI is above normal  Nutrition recommendations include encouraging healthy choices of fruits and vegetables and moderation in carbohydrate intake  Exercise recommendations include exercising 3-5 times per week  No pharmacotherapy was ordered  Rationale for BMI follow-up plan is due to patient being overweight or obese  FAMILY PRACTICE HEALTH MAINTENANCE OFFICE VISIT  [facility] [place] [id number] PHYSICIANS    NAME: Arie Richardson  AGE: 66 y o  SEX: male  : 1950     DATE: 2022    Assessment and Plan     Problem List Items Addressed This Visit        Digestive    LPRD (laryngopharyngeal reflux disease)       Respiratory    Chronic rhinitis    Seasonal allergic rhinitis due to pollen       Musculoskeletal and Integument    Osteoarthritis       Genitourinary    BPH (benign prostatic hyperplasia)       Other    Mixed hyperlipidemia    Persistent cough for 3 weeks or longer    Elevated blood-pressure reading without diagnosis of hypertension      Other Visit Diagnoses     Routine general medical examination at a health care facility    -  Primary    Relevant Orders    POCT urine dip auto non-scope (Completed)    Colon cancer screening        Encounter for prostate cancer screening                   Return in about 3 months (around 2022) for Recheck  Chief Complaint     Chief Complaint   Patient presents with    Annual Exam       History of Present Illness     [address] [place] [address] Rd RECORD  NO CONCERNS AT THIS TIME        Well Adult Physical   Patient here for a comprehensive physical exam       Diet and Physical Activity  Diet: well balanced diet  Weight concerns: Patient is overweight (BMI 25 0-29  9)  Exercise: occasionally      Depression Screen  PHQ-2/9 Depression Screening    Little interest or pleasure in doing things: 0 - not at all  Feeling down, depressed, or hopeless: 0 - not at all  Trouble falling or staying asleep, or sleeping too much: 1 - several days  Feeling tired or having little energy: 0 - not at all  Poor appetite or overeatin - not at all  Feeling bad about yourself - or that you are a failure or have let yourself or your family down: 1 - several days  Trouble concentrating on things, such as reading the newspaper or watching television: 0 - not at all  Moving or speaking so slowly that other people could have noticed  Or the opposite - being so fidgety or restless that you have been moving around a lot more than usual: 0 - not at all  Thoughts that you would be better off dead, or of hurting yourself in some way: 0 - not at all  PHQ-9 Score: 2   PHQ-9 Interpretation: No or Minimal depression           General Health  Hearing: Normal:  bilateral  Vision: no vision problems  Dental: regular dental visits    Reproductive Health          The following portions of the patient's history were reviewed and updated as appropriate: allergies, current medications, past family history, past medical history, past social history, past surgical history and problem list     Review of Systems     Review of Systems   Constitutional: Negative for chills, fatigue and fever  HENT: Negative for congestion, ear discharge, ear pain, mouth sores, postnasal drip, sore throat and trouble swallowing  Eyes: Negative for pain, discharge and visual disturbance  Respiratory: Negative for cough, shortness of breath and wheezing  Cardiovascular: Negative for chest pain, palpitations and leg swelling  Gastrointestinal: Negative for abdominal distention, abdominal pain, blood in stool, diarrhea and nausea  Endocrine: Negative for polydipsia, polyphagia and polyuria  Genitourinary: Negative for dysuria, frequency, hematuria and urgency  Musculoskeletal: Negative for arthralgias, gait problem and joint swelling  Skin: Negative for pallor and rash     Neurological: Negative for dizziness, syncope, speech difficulty, weakness, light-headedness, numbness and headaches  Hematological: Negative for adenopathy  Psychiatric/Behavioral: Negative for behavioral problems, confusion and sleep disturbance  The patient is not nervous/anxious  Past Medical History     History reviewed  No pertinent past medical history  Past Surgical History     Past Surgical History:   Procedure Laterality Date    HIP SURGERY Left     NISHA    HIP SURGERY Right     revision    KIDNEY STONE SURGERY      KNEE SURGERY Right     KNEE SURGERY Right     ligament    KNEE SURGERY Right     ligament    NASAL SINUS SURGERY      TONSILECTOMY AND ADNOIDECTOMY      TOTAL HIP ARTHROPLASTY Right        Social History     Social History     Socioeconomic History    Marital status: /Civil Union     Spouse name: None    Number of children: None    Years of education: None    Highest education level: None   Occupational History    None   Tobacco Use    Smoking status: Never Smoker    Smokeless tobacco: Never Used   Vaping Use    Vaping Use: Never used   Substance and Sexual Activity    Alcohol use:  Yes    Drug use: Not Currently    Sexual activity: Yes     Partners: Female     Birth control/protection: None   Other Topics Concern    None   Social History Narrative    None     Social Determinants of Health     Financial Resource Strain: Not on file   Food Insecurity: Not on file   Transportation Needs: Not on file   Physical Activity: Not on file   Stress: Not on file   Social Connections: Not on file   Intimate Partner Violence: Not on file   Housing Stability: Not on file       Family History     Family History   Problem Relation Age of Onset    Cancer Mother     Arthritis Mother     Prostate cancer Father     Hyperlipidemia Father     Hypertension Father     Prostate cancer Paternal Uncle     Mental illness Neg Hx     Substance Abuse Neg Hx        Current Medications       Current Outpatient Medications:    albuterol (PROVENTIL HFA,VENTOLIN HFA) 90 mcg/act inhaler, Inhale 2 puffs every 4 - 6 hours as needed, Disp: , Rfl:     amLODIPine (NORVASC) 5 mg tablet, Take 5 mg by mouth daily, Disp: , Rfl:     AMOXICILLIN PO, Take by mouth as needed, Disp: , Rfl:     aspirin 325 mg tablet, Take 325 mg by mouth daily  , Disp: , Rfl:     atorvastatin (LIPITOR) 20 mg tablet, TAKE 1 TABLET(20 MG) BY MOUTH DAILY, Disp: 90 tablet, Rfl: 3    celecoxib (CeleBREX) 200 mg capsule, Take 200 mg by mouth daily as needed for mild pain, Disp: , Rfl:     Cholecalciferol (VITAMIN D3 PO), Take 2,000 Units by mouth, Disp: , Rfl:     clotrimazole-betamethasone (LOTRISONE) 1-0 05 % lotion, Apply topically 2 (two) times a day (Patient taking differently: Apply topically 2 (two) times a day as needed ), Disp: 30 mL, Rfl: 3    finasteride (PROSCAR) 5 mg tablet, TAKE 1 TABLET(5 MG) BY MOUTH DAILY, Disp: 90 tablet, Rfl: 3    HYDROcodone-acetaminophen (XODOL) 5-300 MG per tablet, Take 1 tablet by mouth every 6 (six) hours as needed for moderate painMax Daily Amount: 4 tablets, Disp: 15 tablet, Rfl: 0    hydrOXYzine HCL (ATARAX) 10 mg tablet, Take 10 mg by mouth daily as needed , Disp: , Rfl:     IRON, FERROUS SULFATE, PO, Take by mouth every other day , Disp: , Rfl:     metaxalone (SKELAXIN) 800 mg tablet, Take 800 mg by mouth as needed , Disp: , Rfl:     mometasone (NASONEX) 50 mcg/act nasal spray, 1 spray into each nostril as needed (nasal congestion), Disp: 17 g, Rfl: 3    Naproxen Sodium (ALEVE) 220 MG CAPS, Take by mouth as needed , Disp: , Rfl:     omeprazole (PriLOSEC) 40 MG capsule, Take 1 capsule (40 mg total) by mouth daily, Disp: 30 capsule, Rfl: 1    Pseudoephedrine HCl (SUDAFED PO), Take by mouth as needed, Disp: , Rfl:     Pseudoephedrine-guaiFENesin (MUCINEX D PO), Take by mouth as needed, Disp: , Rfl:     tamsulosin (FLOMAX) 0 4 mg, Take 1 capsule (0 4 mg total) by mouth every other day, Disp: 90 capsule, Rfl: 2   traMADol (ULTRAM) 50 mg tablet, TK 1 T PO UP TO TID QD PRN, Disp: , Rfl: 0    vitamin B-12 (CYANOCOBALAMIN) 100 MCG TABS, Take 2,000 mcg by mouth once a week  , Disp: , Rfl:     zolpidem (AMBIEN) 10 mg tablet, Take 10 mg by mouth 1/2 to 1 PRN, Disp: , Rfl:      Allergies     Allergies   Allergen Reactions    Meloxicam Other (See Comments)     suicidial / itching     Nsaids Itching    Medical Tape Itching       Objective     /80 (BP Location: Right arm, Patient Position: Sitting, Cuff Size: Adult)   Pulse 84   Temp 97 6 °F (36 4 °C) (Temporal)   Resp 12   Ht 5' 6" (1 676 m)   Wt 72 1 kg (159 lb)   SpO2 97%   BMI 25 66 kg/m²      Physical Exam  Constitutional:       Appearance: He is well-developed  HENT:      Head: Normocephalic and atraumatic  Right Ear: External ear normal       Left Ear: External ear normal       Nose: Nose normal    Eyes:      General:         Right eye: No discharge  Left eye: No discharge  Conjunctiva/sclera: Conjunctivae normal       Pupils: Pupils are equal, round, and reactive to light  Neck:      Thyroid: No thyromegaly  Vascular: No JVD  Cardiovascular:      Rate and Rhythm: Normal rate and regular rhythm  Heart sounds: Normal heart sounds  No murmur heard  Pulmonary:      Effort: Pulmonary effort is normal       Breath sounds: Normal breath sounds  No wheezing or rales  Abdominal:      General: Bowel sounds are normal       Palpations: Abdomen is soft  There is no mass  Tenderness: There is no abdominal tenderness  There is no guarding or rebound  Genitourinary:     Penis: Normal        Prostate: Normal       Rectum: Normal  Guaiac result negative  Musculoskeletal:         General: No tenderness or deformity  Normal range of motion  Cervical back: Neck supple  Lymphadenopathy:      Cervical: No cervical adenopathy  Skin:     General: Skin is warm and dry  Findings: No erythema or rash     Neurological: Mental Status: He is alert and oriented to person, place, and time  Cranial Nerves: No cranial nerve deficit  Motor: No abnormal muscle tone  Coordination: Coordination normal       Deep Tendon Reflexes: Reflexes are normal and symmetric  Psychiatric:         Behavior: Behavior normal          Thought Content:  Thought content normal          Judgment: Judgment normal            No exam data present    Health Maintenance     Health Maintenance   Topic Date Due    DTaP,Tdap,and Td Vaccines (1 - Tdap) Never done    Fall Risk  12/23/2022    BMI: Adult  02/11/2023    Annual Physical  02/11/2023    Depression Remission PHQ  02/11/2023    BMI: Followup Plan  02/13/2023    Colorectal Cancer Screening  08/18/2025    Hepatitis C Screening  Completed    Pneumococcal Vaccine: 65+ Years  Completed    Influenza Vaccine  Completed    COVID-19 Vaccine  Completed    HIB Vaccine  Aged Out    Hepatitis B Vaccine  Aged Out    IPV Vaccine  Aged Out    Hepatitis A Vaccine  Aged Out    Meningococcal ACWY Vaccine  Aged Out    HPV Vaccine  Aged Out     Immunization History   Administered Date(s) Administered    COVID-19 PFIZER VACCINE 0 3 ML IM 03/01/2021, 03/22/2021, 09/30/2021    INFLUENZA 09/20/2021    Influenza, high dose seasonal 0 7 mL 10/22/2020    Pneumococcal Conjugate 13-Valent 12/18/2015, 11/01/2017    Pneumococcal Polysaccharide PPV23 11/20/2019    influenza, trivalent, adjuvanted 09/30/2021       Behzad Black MD  [facility]

## 2022-02-11 NOTE — LETTER
[address] [facility],  [address]      Current Outpatient Medications:     albuterol (PROVENTIL HFA,VENTOLIN HFA) 90 mcg/act inhaler, Inhale 2 puffs every 4 - 6 hours as needed, Disp: , Rfl:     amLODIPine (NORVASC) 5 mg tablet, Take 5 mg by mouth daily, Disp: , Rfl:     AMOXICILLIN PO, Take by mouth as needed, Disp: , Rfl:     aspirin 325 mg tablet, Take 325 mg by mouth daily  , Disp: , Rfl:     atorvastatin (LIPITOR) 20 mg tablet, TAKE 1 TABLET(20 MG) BY MOUTH DAILY, Disp: 90 tablet, Rfl: 3    celecoxib (CeleBREX) 200 mg capsule, Take 200 mg by mouth daily as needed for mild pain, Disp: , Rfl:     Cholecalciferol (VITAMIN D3 PO), Take 2,000 Units by mouth, Disp: , Rfl:     clotrimazole-betamethasone (LOTRISONE) 1-0 05 % lotion, Apply topically 2 (two) times a day (Patient taking differently: Apply topically 2 (two) times a day as needed ), Disp: 30 mL, Rfl: 3    finasteride (PROSCAR) 5 mg tablet, TAKE 1 TABLET(5 MG) BY MOUTH DAILY, Disp: 90 tablet, Rfl: 3    HYDROcodone-acetaminophen (XODOL) 5-300 MG per tablet, Take 1 tablet by mouth every 6 (six) hours as needed for moderate painMax Daily Amount: 4 tablets, Disp: 15 tablet, Rfl: 0    hydrOXYzine HCL (ATARAX) 10 mg tablet, Take 10 mg by mouth daily as needed , Disp: , Rfl:     IRON, FERROUS SULFATE, PO, Take by mouth every other day , Disp: , Rfl:     metaxalone (SKELAXIN) 800 mg tablet, Take 800 mg by mouth as needed , Disp: , Rfl:     mometasone (NASONEX) 50 mcg/act nasal spray, 1 spray into each nostril as needed (nasal congestion), Disp: 17 g, Rfl: 3    Naproxen Sodium (ALEVE) 220 MG CAPS, Take by mouth as needed , Disp: , Rfl:     omeprazole (PriLOSEC) 40 MG capsule, Take 1 capsule (40 mg total) by mouth daily, Disp: 30 capsule, Rfl: 1    predniSONE 20 mg tablet, 2 PO QD X 4 DAYS, THEN 1 PO QD X 4 DAYS, THEN 1/2 PO QD X 4 DAYS, Disp: 14 tablet, Rfl: 0    Pseudoephedrine HCl (SUDAFED PO), Take by mouth as needed, Disp: , Rfl:     Pseudoephedrine-guaiFENesin ([place] [address] D PO), Take by mouth as needed, Disp: , Rfl:     tamsulosin (FLOMAX) 0 4 mg, Take 1 capsule (0 4 mg total) by mouth every other day, Disp: 90 capsule, Rfl: 2    traMADol (ULTRAM) 50 mg tablet, TK 1 T PO UP TO TID QD PRN, Disp: , Rfl: 0    vitamin B-12 (CYANOCOBALAMIN) 100 MCG TABS, Take 2,000 mcg by mouth once a week  , Disp: , Rfl:     zolpidem (AMBIEN) 10 mg tablet, Take 10 mg by mouth 1/2 to 1 PRN, Disp: , Rfl:     Recent Results (from the past 672 hour(s))   Iron    Collection Time: 02/02/22  8:03 AM   Result Value Ref Range    Iron, Serum 95 50 - 180 mcg/dL   Vitamin B12    Collection Time: 02/02/22  8:03 AM   Result Value Ref Range    Vitamin B-12 760 200 - 1,100 pg/mL   [place]    Collection Time: 02/02/22  8:03 AM   Result Value Ref Range    [place] <0 5 <=1 8 mcg/L   Chromium level    Collection Time: 02/02/22  8:03 AM   Result Value Ref Range    Chromium <0 5 <=1 2 mcg/L   Lipid panel    Collection Time: 02/02/22  8:03 AM   Result Value Ref Range    Total Cholesterol 174 <200 mg/dL    HDL 84 > OR = 40 mg/dL    Triglycerides 60 <150 mg/dL    LDL Calculated 76 mg/dL (calc)    Chol HDLC Ratio 2 1 <5 0 (calc)    Non-HDL Cholesterol 90 <130 mg/dL (calc)   Comprehensive metabolic panel    Collection Time: 02/02/22  8:03 AM   Result Value Ref Range    Glucose, Random 101 (H) 65 - 99 mg/dL    BUN 22 7 - 25 mg/dL    Creatinine 0 87 0 70 - 1 18 mg/dL    eGFR Non  86 > OR = 60 mL/min/1 73m2    eGFR  99 > OR = 60 mL/min/1 73m2    SL AMB BUN/CREATININE RATIO NOT APPLICABLE 6 - 22 (calc)    Sodium 142 135 - 146 mmol/L    Potassium 5 1 3 5 - 5 3 mmol/L    Chloride 106 98 - 110 mmol/L    CO2 29 20 - 32 mmol/L    Calcium 9 9 8 6 - 10 3 mg/dL    Protein, Total 6 5 6 1 - 8 1 g/dL    Albumin 4 5 3 6 - 5 1 g/dL    Globulin 2 0 1 9 - 3 7 g/dL (calc)    Albumin/Globulin Ratio 2 3 1 0 - 2 5 (calc)    TOTAL BILIRUBIN 0 7 0 2 - 1 2 mg/dL    Alkaline Phosphatase 76 35 - 144 U/L    AST 18 10 - 35 U/L    ALT 16 9 - 46 U/L   CBC and differential    Collection Time: 02/02/22  8:03 AM   Result Value Ref Range    White Blood Cell Count 4 2 3 8 - 10 8 Thousand/uL    Red Blood Cell Count 5 12 4 20 - 5 80 Million/uL    Hemoglobin 15 2 13 2 - 17 1 g/dL    HCT 46 0 38 5 - 50 0 %    MCV 89 8 80 0 - 100 0 fL    MCH 29 7 27 0 - 33 0 pg    MCHC 33 0 32 0 - 36 0 g/dL    RDW 13 3 11 0 - 15 0 %    Platelet Count 012 430 - 400 Thousand/uL    SL AMB MPV 11 1 7 5 - 12 5 fL    Neutrophils (Absolute) 2,554 1,500 - 7,800 cells/uL    Lymphocytes (Absolute) 1,004 850 - 3,900 cells/uL    Monocytes (Absolute) 328 200 - 950 cells/uL    Eosinophils (Absolute) 256 15 - 500 cells/uL    Basophils ABS 59 0 - 200 cells/uL    Neutrophils 60 8 %    Lymphocytes 23 9 %    Monocytes 7 8 %    Eosinophils 6 1 %    Basophils PCT 1 4 %   TSH, 3rd generation    Collection Time: 02/02/22  8:03 AM   Result Value Ref Range    TSH 2 19 0 40 - 4 50 mIU/L   PSA Total (Reflex To Free)    Collection Time: 02/02/22  8:03 AM   Result Value Ref Range    Prostate Specific Antigen Total 1 2 < OR = 4 0 ng/mL

## 2022-02-11 NOTE — PATIENT INSTRUCTIONS
DISCUSSED HEALTH MAINTENENCE ISSUES  BW WILL BE OBTAINED  ENCOURAGED HEALTHY DIET AND EXERCISE  COLONOSCOPY WILL BE ORDERED  RV FOR ANNUAL HEALTH EXAM IN 1 YEAR  RV SOONER IF THERE ARE ANY CONCERNS      rv 3 m    Recent Results (from the past 672 hour(s))   Iron    Collection Time: 02/02/22  8:03 AM   Result Value Ref Range    Iron, Serum 95 50 - 180 mcg/dL   Vitamin B12    Collection Time: 02/02/22  8:03 AM   Result Value Ref Range    Vitamin B-12 760 200 - 1,100 pg/mL   [place]    Collection Time: 02/02/22  8:03 AM   Result Value Ref Range    [place] <0 5 <=1 8 mcg/L   Chromium level    Collection Time: 02/02/22  8:03 AM   Result Value Ref Range    Chromium <0 5 <=1 2 mcg/L   Lipid panel    Collection Time: 02/02/22  8:03 AM   Result Value Ref Range    Total Cholesterol 174 <200 mg/dL    HDL 84 > OR = 40 mg/dL    Triglycerides 60 <150 mg/dL    LDL Calculated 76 mg/dL (calc)    Chol HDLC Ratio 2 1 <5 0 (calc)    Non-HDL Cholesterol 90 <130 mg/dL (calc)   Comprehensive metabolic panel    Collection Time: 02/02/22  8:03 AM   Result Value Ref Range    Glucose, Random 101 (H) 65 - 99 mg/dL    BUN 22 7 - 25 mg/dL    Creatinine 0 87 0 70 - 1 18 mg/dL    eGFR Non  86 > OR = 60 mL/min/1 73m2    eGFR  99 > OR = 60 mL/min/1 73m2    SL AMB BUN/CREATININE RATIO NOT APPLICABLE 6 - 22 (calc)    Sodium 142 135 - 146 mmol/L    Potassium 5 1 3 5 - 5 3 mmol/L    Chloride 106 98 - 110 mmol/L    CO2 29 20 - 32 mmol/L    Calcium 9 9 8 6 - 10 3 mg/dL    Protein, Total 6 5 6 1 - 8 1 g/dL    Albumin 4 5 3 6 - 5 1 g/dL    Globulin 2 0 1 9 - 3 7 g/dL (calc)    Albumin/Globulin Ratio 2 3 1 0 - 2 5 (calc)    TOTAL BILIRUBIN 0 7 0 2 - 1 2 mg/dL    Alkaline Phosphatase 76 35 - 144 U/L    AST 18 10 - 35 U/L    ALT 16 9 - 46 U/L   CBC and differential    Collection Time: 02/02/22  8:03 AM   Result Value Ref Range    White Blood Cell Count 4 2 3 8 - 10 8 Thousand/uL    Red Blood Cell Count 5 12 4 20 - 5 80 Million/uL Hemoglobin 15 2 13 2 - 17 1 g/dL    HCT 46 0 38 5 - 50 0 %    MCV 89 8 80 0 - 100 0 fL    MCH 29 7 27 0 - 33 0 pg    MCHC 33 0 32 0 - 36 0 g/dL    RDW 13 3 11 0 - 15 0 %    Platelet Count 992 980 - 400 Thousand/uL    SL AMB MPV 11 1 7 5 - 12 5 fL    Neutrophils (Absolute) 2,554 1,500 - 7,800 cells/uL    Lymphocytes (Absolute) 1,004 850 - 3,900 cells/uL    Monocytes (Absolute) 328 200 - 950 cells/uL    Eosinophils (Absolute) 256 15 - 500 cells/uL    Basophils ABS 59 0 - 200 cells/uL    Neutrophils 60 8 %    Lymphocytes 23 9 %    Monocytes 7 8 %    Eosinophils 6 1 %    Basophils PCT 1 4 %   TSH, 3rd generation    Collection Time: 02/02/22  8:03 AM   Result Value Ref Range    TSH 2 19 0 40 - 4 50 mIU/L   PSA Total (Reflex To Free)    Collection Time: 02/02/22  8:03 AM   Result Value Ref Range    Prostate Specific Antigen Total 1 2 < OR = 4 0 ng/mL

## 2022-02-16 ENCOUNTER — TELEPHONE (OUTPATIENT)
Dept: FAMILY MEDICINE CLINIC | Facility: CLINIC | Age: 72
End: 2022-02-16

## 2022-02-16 NOTE — TELEPHONE ENCOUNTER
Has a few follow up questions    1) You sent in rx for omeprazole  When Jeanine Roth was taking this it really did not work for him  He thought you were going to wait  Do you still want him to take it? 2) You were going to send in tramadol and Ambien  Nothing was sent  Uses [facility] in [place]    3)  You were going to remove his allergy to NSAIDS  He wanted to make sure that it was done  He stated he explained all that to you

## 2022-02-17 ENCOUNTER — TELEPHONE (OUTPATIENT)
Dept: FAMILY MEDICINE CLINIC | Facility: CLINIC | Age: 72
End: 2022-02-17

## 2022-02-17 NOTE — TELEPHONE ENCOUNTER
Abdulaziz Ledbetter states he picked up Tramadol, but would like a rx for Fatou Letters to [facility]

## 2022-02-17 NOTE — PATIENT INSTRUCTIONS
Please continue to schedule a follow-up session within one week  In case of a psychiatric emergency please contact any of the following:  [facility] [phone] or [phone]     [place] Suicide Prevention Lifeline : 9-425.252.9747

## 2022-02-17 NOTE — PSYCH
This note was not shared with the patient due to this is a psychotherapy note     Assessment/Plan:      Diagnoses and all orders for this visit:    ADHD, hyperactive-impulsive type    Adjustment disorder with anxiety          Subjective:     Patient ID: Ezekiel Molina is a 66 y o  male who presented for his follow-up outpatient counseling appointment  Ron valleiues to express the desire to work on his anger management skills  The undersigned therapist assisted Ron Kristin identify his triggers and discuss CBT  Ron Foster has to process internal thoughts and Ron Foster reports he's aware that he sometimes jumps to defensive patterns  Ron Foster reports since therapy he's been exercising more and wanting to use his body for a natural release of hormones to change his attitude  Ron Foster reports he hasn't had an "angry outburst" in months, but was triggered by his wife's comment during dinner about him not changing his clothes everyday  Ron Foster also expressed feelings of guilt about his daughter, and how he feels his anger and short temper may have influenced her  The undersigned therapist assisted Ron Foster process his feelings about his relationship with his daughter and what is in his control at this time  Ron Foster shared that he went birding this Sunday with his friend and had a great time being mindful and focused in the moment  Ron Foster also disclosed having an emotional and intimate affair with a "lady friend from school "  Ron Foster reports she's the only woman he didn't have difficulty performing sexually, but reports he hasn't seen her since he moved to [address] [place] [address]  Ron Foster  denies suicidal intent, gesture or ideation at this time  The undersigned therapist provided Ron Foster with 45 minutes of psychotherapy  Review of Systems   Psychiatric/Behavioral: Negative            Objective:     Physical Exam  Psychiatric:         Attention and Perception: Attention and perception normal          Mood and Affect: Mood and affect normal          Speech: Speech normal          Behavior: Behavior is hyperactive  Behavior is cooperative  Thought Content:  Thought content normal          Cognition and Memory: Cognition and memory normal          Judgment: Judgment normal

## 2022-02-24 ENCOUNTER — SOCIAL WORK (OUTPATIENT)
Dept: BEHAVIORAL/MENTAL HEALTH CLINIC | Facility: CLINIC | Age: 72
End: 2022-02-24
Payer: COMMERCIAL

## 2022-02-24 DIAGNOSIS — F90.1 ADHD, HYPERACTIVE-IMPULSIVE TYPE: Primary | ICD-10-CM

## 2022-02-24 DIAGNOSIS — F43.22 ADJUSTMENT DISORDER WITH ANXIETY: ICD-10-CM

## 2022-02-24 PROCEDURE — 90834 PSYTX W PT 45 MINUTES: CPT | Performed by: SOCIAL WORKER

## 2022-03-03 ENCOUNTER — SOCIAL WORK (OUTPATIENT)
Dept: BEHAVIORAL/MENTAL HEALTH CLINIC | Facility: CLINIC | Age: 72
End: 2022-03-03
Payer: COMMERCIAL

## 2022-03-03 DIAGNOSIS — F43.22 ADJUSTMENT DISORDER WITH ANXIETY: ICD-10-CM

## 2022-03-03 DIAGNOSIS — F90.1 ADHD, HYPERACTIVE-IMPULSIVE TYPE: Primary | ICD-10-CM

## 2022-03-03 PROCEDURE — 90834 PSYTX W PT 45 MINUTES: CPT | Performed by: SOCIAL WORKER

## 2022-03-03 NOTE — PATIENT INSTRUCTIONS
Please continue to schedule a follow-up session within one week  In case of a psychiatric emergency please contact any of the following:  [facility] [phone] or [phone]     National Suicide Prevention Lifeline : 6-596.397.8136

## 2022-03-04 NOTE — PSYCH
This note was not shared with the patient due to this is a psychotherapy note       Assessment/Plan:      Diagnoses and all orders for this visit:    ADHD, hyperactive-impulsive type    Adjustment disorder with anxiety          Subjective:     Patient ID: Ezekiel Molina is a 66 y o  male who presented for his follow-up outpatient counseling appointment with undersigned therapist   During today's session, Ron Foster expressed his feeling about his wife and daughter  Ron Foster reports his wife is constnatly negative and he's trying to make a conscious effort not to fight or argue with her and we discussed anger management strategies  Ron Foster expressed feelings of guilt towards his daughter's mental health and behaviors and feels he didn't do the best job when she was little ot deal with her mental health  The undersigned therapist noramlized his feelings of guilt and encouraged he work on being open with her now and attending terapy together  Ron Foster reports his daugther was back in the hospital due to her anxiety but continues to exhibit drug seeking behaviors  Ron Foster shared history with his previous paramour and reports they talked on the phone this week, but hasn't seen her in 5 years  Ron Foster shared that in 44 he had a stillborn and after that is when they adopted his present daughter  The undersigned therapist assisted Ron Foster process his feelings about the stillborn  Ron Foster reports he's been 36 years  and wants to continue working things through  Ron Foster  denies suicidal intent, gesture or ideation at this time  The undersigned therapist provided Ron Kristin with 45 minutes of psychotherapy  Review of Systems   Psychiatric/Behavioral: The patient is hyperactive  Objective:     Physical Exam  Psychiatric:         Attention and Perception: Attention and perception normal          Mood and Affect: Mood and affect normal          Speech: Speech normal          Behavior: Behavior normal  Behavior is cooperative  Thought Content:  Thought content normal          Cognition and Memory: Cognition and memory normal          Judgment: Judgment normal

## 2022-03-10 ENCOUNTER — SOCIAL WORK (OUTPATIENT)
Dept: BEHAVIORAL/MENTAL HEALTH CLINIC | Facility: CLINIC | Age: 72
End: 2022-03-10
Payer: COMMERCIAL

## 2022-03-10 DIAGNOSIS — F90.1 ADHD, HYPERACTIVE-IMPULSIVE TYPE: Primary | ICD-10-CM

## 2022-03-10 DIAGNOSIS — F43.22 ADJUSTMENT DISORDER WITH ANXIETY: ICD-10-CM

## 2022-03-10 PROCEDURE — 90834 PSYTX W PT 45 MINUTES: CPT | Performed by: SOCIAL WORKER

## 2022-03-11 NOTE — PSYCH
This note was not shared with the patient due to this is a psychotherapy note     Assessment/Plan:      Diagnoses and all orders for this visit:    ADHD, hyperactive-impulsive type    Adjustment disorder with anxiety          Subjective:     Patient ID: Marylou Torres is a 66 y o  male who presented for his follow-up outpatient counseling appointment with undersigned therapist  During today's session Jamel Bermeo expressed his desire to study bee-keeping  He felt very disorganized this morning and also felt sad/depressed  He believes the moon influenced his mood and reports feeling better today  Jamel Bermeo shared heavily about his difficulty relating to men and his experiences with his new friends  The undersigned therapist assisted Jamel Bermeo process his feelings about his behaviors with men, and his social activity  Jamel Bermeo shared an incident where he scared a group of guys and one of the guys didn't like his joke- and has ignored him ever since  The undersigned therapist continued to express that Jamel Bermeo is not responsible for other people's feelings or behaviors  Jamel Bermeo agreed  Jamel Bermeo reports his daughter, a [place] adoptee, has been struggling with her addiction  Jamel Bermeo reports some increased stress due to the [place] political climate that's going on now  Jamel Bermeo denies suicidal intent, gesture or ideation at this time  The undersigned therapist provided Jamel Bermeo with 45 minutes of psychotherapy  Review of Systems   Psychiatric/Behavioral: The patient is nervous/anxious  Objective:     Physical Exam  Psychiatric:         Attention and Perception: Attention and perception normal          Mood and Affect: Mood is anxious  Speech: Speech normal          Behavior: Behavior is hyperactive  Behavior is cooperative  Thought Content: Thought content normal          Cognition and Memory: Cognition and memory normal          Judgment: Judgment is impulsive

## 2022-03-11 NOTE — PATIENT INSTRUCTIONS
Please continue to schedule a follow-up session within one week  In case of a psychiatric emergency please contact any of the following:  [facility] [phone] or [phone]     National Suicide Prevention Lifeline : [phone]

## 2022-03-17 ENCOUNTER — SOCIAL WORK (OUTPATIENT)
Dept: BEHAVIORAL/MENTAL HEALTH CLINIC | Facility: CLINIC | Age: 72
End: 2022-03-17
Payer: COMMERCIAL

## 2022-03-17 DIAGNOSIS — K21.9 LPRD (LARYNGOPHARYNGEAL REFLUX DISEASE): ICD-10-CM

## 2022-03-17 DIAGNOSIS — R05.3 PERSISTENT COUGH FOR 3 WEEKS OR LONGER: ICD-10-CM

## 2022-03-17 DIAGNOSIS — F43.22 ADJUSTMENT DISORDER WITH ANXIETY: ICD-10-CM

## 2022-03-17 DIAGNOSIS — F90.1 ADHD, HYPERACTIVE-IMPULSIVE TYPE: Primary | ICD-10-CM

## 2022-03-17 PROCEDURE — 90834 PSYTX W PT 45 MINUTES: CPT | Performed by: SOCIAL WORKER

## 2022-03-17 RX ORDER — OMEPRAZOLE 40 MG/1
CAPSULE, DELAYED RELEASE ORAL
Qty: 30 CAPSULE | Refills: 1 | Status: SHIPPED | OUTPATIENT
Start: 2022-03-17

## 2022-03-17 NOTE — PSYCH
This note was not shared with the patient due to this is a psychotherapy note     Assessment/Plan:      There are no diagnoses linked to this encounter  Subjective:     Patient ID: Ashley Palma is a 66 y o  male who presented for his follow-up outpatient counseling appointment with undersigned therapist  Hayde Kumar reports his daughter went back to the hospital after the surgery and appears to continue drug seeking behaviors  Hayde Kumar reports he's been mindful about his eating, dieting and feels he has been able to control his anger  Hayde Kumar reports his wife will be leaving to [place] the end of this month  Hayde Kumar did share that he has been drinking one beer almost every 4 days and he has been hiding it from his wife  However, he reports moderation and we discussed the disease of addiciton and to keep in mind the risk  Hayde Choudhurycatrachita appears hyperactive as usual and engaged in sessions, but reports he will be taking time off of therapy for a month or two until he returns  The undersigned therapist agreed and encourage he call when he wants to make a follow up appointment  Hayde Kumar denies suicidal intent, gesture or ideation at this time  The undersigned therapist provided Hayde Kumar with 45 minutes of psychotherapy  Review of Systems   Psychiatric/Behavioral: Negative  Objective:     Physical Exam  Psychiatric:         Attention and Perception: Attention and perception normal          Mood and Affect: Mood and affect normal          Speech: Speech normal          Behavior: Behavior is hyperactive  Behavior is cooperative  Thought Content:  Thought content normal          Cognition and Memory: Cognition and memory normal          Judgment: Judgment normal

## 2022-03-21 NOTE — PSYCH
This note was not shared with the patient due to this is a psychotherapy note   Assessment/Plan:      Diagnoses and all orders for this visit:    ADHD, hyperactive-impulsive type    Adjustment disorder with anxiety          Subjective:     Patient ID: Kaleigh Lobo is a 66 y o  male who presented for his outpatient counseling appointment with undersigned therapist   Viry Webber reports he had a "rough week" this week  Viry Webber reports today is the first day of his no coffee streak  Viry Webber reports his sister's  passed away in [facility] 68  Viry Webber reports he found out Sunday night and has been unable to communicate with his sister  Viry Webber reports continued stress with his wife but reports he's been keeping calm and collect  Viry Webber reports his daughter recently got shoulder surgery and continues to present with Münchhausen's Syndrome and drug seeking behavior  The undersigned therapist assisted Viry Webber process his feelings about his daughter and wife  Viry Webber continues to express the perspective of "things are not in my control," and has been calmer  Viry Webber denies suicidal intent, gesture or ideation at this time  The undersigned therapist provided Viry Webber with 45 minutes of psychotherapy  Review of Systems   Psychiatric/Behavioral: The patient is nervous/anxious  Objective:     Physical Exam  Psychiatric:         Attention and Perception: Attention and perception normal          Mood and Affect: Affect normal  Mood is anxious  Speech: Speech normal          Behavior: Behavior normal  Behavior is cooperative  Thought Content:  Thought content normal          Cognition and Memory: Cognition and memory normal          Judgment: Judgment normal

## 2022-03-21 NOTE — PATIENT INSTRUCTIONS
Please continue to schedule a follow-up session within one week  In case of a psychiatric emergency please contact any of the following:  [facility] [phone] or [phone]     National Suicide Prevention Lifeline : 2-720.828.8940

## 2022-03-24 DIAGNOSIS — J30.1 SEASONAL ALLERGIC RHINITIS DUE TO POLLEN: ICD-10-CM

## 2022-03-24 RX ORDER — MOMETASONE FUROATE 50 UG/1
SPRAY, METERED NASAL
Qty: 17 G | Refills: 3 | Status: SHIPPED | OUTPATIENT
Start: 2022-03-24

## 2022-03-24 NOTE — PATIENT INSTRUCTIONS
Please continue to schedule a follow-up session within one week  In case of a psychiatric emergency please contact any of the following:  [facility] [phone] or [phone]     National Suicide Prevention Lifeline : 2-424.746.7301

## 2022-03-25 DIAGNOSIS — T84.498S FAILED ORTHOPEDIC IMPLANT, SEQUELA: ICD-10-CM

## 2022-03-25 RX ORDER — TRAMADOL HYDROCHLORIDE 50 MG/1
50 TABLET ORAL EVERY 8 HOURS PRN
Qty: 30 TABLET | Refills: 0 | Status: SHIPPED | OUTPATIENT
Start: 2022-03-25 | End: 2022-05-05 | Stop reason: SDUPTHER

## 2022-04-26 DIAGNOSIS — M15.9 PRIMARY OSTEOARTHRITIS INVOLVING MULTIPLE JOINTS: Primary | ICD-10-CM

## 2022-04-26 RX ORDER — CELECOXIB 200 MG/1
200 CAPSULE ORAL DAILY PRN
Qty: 30 CAPSULE | Refills: 0 | Status: SHIPPED | OUTPATIENT
Start: 2022-04-26 | End: 2022-06-29

## 2022-04-27 PROCEDURE — 93000 ELECTROCARDIOGRAM COMPLETE: CPT | Performed by: FAMILY MEDICINE

## 2022-05-05 ENCOUNTER — SOCIAL WORK (OUTPATIENT)
Dept: BEHAVIORAL/MENTAL HEALTH CLINIC | Facility: CLINIC | Age: 72
End: 2022-05-05
Payer: COMMERCIAL

## 2022-05-05 DIAGNOSIS — T84.498S FAILED ORTHOPEDIC IMPLANT, SEQUELA: ICD-10-CM

## 2022-05-05 DIAGNOSIS — G47.9 SLEEP DISORDER: ICD-10-CM

## 2022-05-05 DIAGNOSIS — F43.22 ADJUSTMENT DISORDER WITH ANXIETY: ICD-10-CM

## 2022-05-05 DIAGNOSIS — F90.1 ADHD, HYPERACTIVE-IMPULSIVE TYPE: Primary | ICD-10-CM

## 2022-05-05 PROCEDURE — 90834 PSYTX W PT 45 MINUTES: CPT | Performed by: SOCIAL WORKER

## 2022-05-05 RX ORDER — TRAMADOL HYDROCHLORIDE 50 MG/1
50 TABLET ORAL EVERY 8 HOURS PRN
Qty: 30 TABLET | Refills: 0 | Status: SHIPPED | OUTPATIENT
Start: 2022-05-05 | End: 2022-07-12 | Stop reason: SDUPTHER

## 2022-05-05 RX ORDER — ZOLPIDEM TARTRATE 10 MG/1
10 TABLET ORAL
Qty: 30 TABLET | Refills: 0 | Status: SHIPPED | OUTPATIENT
Start: 2022-05-05 | End: 2022-07-12 | Stop reason: SDUPTHER

## 2022-05-10 NOTE — PATIENT INSTRUCTIONS
Please continue to schedule a follow-up session within one week  In case of a psychiatric emergency please contact any of the following:  [facility] [phone] or [phone]     National Suicide Prevention Lifeline : 0-377.534.2403

## 2022-05-10 NOTE — PSYCH
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  Mili Wilcox reports he hasn't lost his temper in a long time and enjoyed his time away from his wife  Mili Wilcox reports 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 and tries to get out of his home as much as he can  Mili Wilcox reports his wife continues to present negatively and be unhappy but he continues to work through it and not let it affect him  Mili Wilcox denies any anxiety, depression or anger at this time  The undersigned therapist assisted Mili Wilcox process his feelings about his employment changing and being rational with himself  Mili Wilcox denies suicidal intent, gesture or ideation at this time  The undersigned therapist provided Mili Wilcox with 45 minutes of psychotherapy  Review of Systems   Psychiatric/Behavioral: Negative  Objective:     Physical Exam  Psychiatric:         Attention and Perception: Attention and perception normal          Mood and Affect: Mood and affect normal          Speech: Speech normal          Behavior: Behavior normal  Behavior is cooperative  Thought Content:  Thought content normal          Cognition and Memory: Cognition and memory normal          Judgment: Judgment normal

## 2022-05-11 ENCOUNTER — OFFICE VISIT (OUTPATIENT)
Dept: FAMILY MEDICINE CLINIC | Facility: CLINIC | Age: 72
End: 2022-05-11
Payer: COMMERCIAL

## 2022-05-11 VITALS
OXYGEN SATURATION: 98 % | RESPIRATION RATE: 12 BRPM | DIASTOLIC BLOOD PRESSURE: 82 MMHG | WEIGHT: 155 LBS | SYSTOLIC BLOOD PRESSURE: 140 MMHG | HEIGHT: 66 IN | BODY MASS INDEX: 24.91 KG/M2 | HEART RATE: 79 BPM | TEMPERATURE: 97.2 F

## 2022-05-11 DIAGNOSIS — R03.0 ELEVATED BLOOD-PRESSURE READING WITHOUT DIAGNOSIS OF HYPERTENSION: Primary | ICD-10-CM

## 2022-05-11 DIAGNOSIS — N40.0 BENIGN PROSTATIC HYPERPLASIA, UNSPECIFIED WHETHER LOWER URINARY TRACT SYMPTOMS PRESENT: ICD-10-CM

## 2022-05-11 DIAGNOSIS — K21.9 LPRD (LARYNGOPHARYNGEAL REFLUX DISEASE): ICD-10-CM

## 2022-05-11 DIAGNOSIS — M15.9 PRIMARY OSTEOARTHRITIS INVOLVING MULTIPLE JOINTS: ICD-10-CM

## 2022-05-11 DIAGNOSIS — E78.2 MIXED HYPERLIPIDEMIA: ICD-10-CM

## 2022-05-11 PROBLEM — R53.83 FATIGUE: Status: RESOLVED | Noted: 2020-01-10 | Resolved: 2022-05-11

## 2022-05-11 PROCEDURE — 99214 OFFICE O/P EST MOD 30 MIN: CPT | Performed by: FAMILY MEDICINE

## 2022-05-11 PROCEDURE — 1036F TOBACCO NON-USER: CPT | Performed by: FAMILY MEDICINE

## 2022-05-11 PROCEDURE — 3008F BODY MASS INDEX DOCD: CPT | Performed by: FAMILY MEDICINE

## 2022-05-11 NOTE — PROGRESS NOTES
Assessment/Plan:    Elevated blood-pressure reading without diagnosis of hypertension  STABLE  DENIES ANY CP, SOB, PALPITATIONS, OR HEADACHE  NOTES NO WATER RETENTION  COMPLIANT WITH MEDICATION  NO CONCERNS    - CONTINUE CURRENT TREATMENT PLAN  - MONITOR DIETARY SODIUM INTAKE  - ENCOURAGE PHYSICAL ACTIVITY  - RV 3 MONTHS      Mixed hyperlipidemia  WATCHING CHOLESTEROL INTAKE  COMPLIANT WITH MEDICATION  NO CONCERNS    - CONTINUE TO MONITOR DIETARY CHOL INTAKE  - CONTINUE CURRENT MEDICATION  - ENCOURAGED PHYSICAL ACTIVITY        LPRD (laryngopharyngeal reflux disease)  STABLE  DENIES ANY CP, INDIGESTION, OR COUGH  NOTES NO MELENA OR HEMATOCHEZIA  NO HEMATEMESIS  COMPLIANT WITH MEDICATION  NO CONCERNS    - CONTINUE CURRENT TREATMENT PLAN  - MEDICATION AS PRESCRIBED  - AVOID CAFFEINE, ALCOHOL OR SMOKING      Osteoarthritis  STABLE  DENIES ANY JOINT SWELLING OR REDNESS  JOINT STIFFNESS PRESENT  PAIN MANAGEMENT ADEQUATE    - CONTINUE CURRENT MANAGEMENT  - MEDICATION AS DIRECTED  - CALL / RETURN IF SYMPTOMS WORSEN         Diagnoses and all orders for this visit:    Elevated blood-pressure reading without diagnosis of hypertension    Mixed hyperlipidemia    LPRD (laryngopharyngeal reflux disease)    Benign prostatic hyperplasia, unspecified whether lower urinary tract symptoms present    Primary osteoarthritis involving multiple joints          Patient Instructions   CONTINUE CURRENT TREATMENT PLAN  MONITOR DIETARY SODIUM, CHOL, AND CARBOHYDRATE INTAKE  ENCOURAGE PHYSICAL ACTIVITY      RV 6 M, SOONER PRN            Return in about 6 months (around 11/11/2022)  Subjective:      Patient ID: Marva Mclean is a 74 y o  male      Chief Complaint   Patient presents with    Follow-up       PATIENT RETURNS FOR ROUTINE EVALUATION OF PATIENT'S MEDICAL ISSUES    INDIVIDUAL MEDICAL ISSUES WITH THEIR CURRENT STATUS, ASSESSMENT AND PLANS ARE LISTED ABOVE          The following portions of the patient's history were reviewed and updated as appropriate: allergies, current medications, past family history, past medical history, past social history, past surgical history and problem list     Review of Systems   Constitutional: Negative for chills, fatigue and fever  HENT: Negative for congestion, ear discharge, ear pain, mouth sores, postnasal drip, sore throat and trouble swallowing  Eyes: Negative for pain, discharge and visual disturbance  Respiratory: Negative for cough, shortness of breath and wheezing  Cardiovascular: Negative for chest pain, palpitations and leg swelling  Gastrointestinal: Negative for abdominal distention, abdominal pain, blood in stool, diarrhea and nausea  Endocrine: Negative for polydipsia, polyphagia and polyuria  Genitourinary: Negative for dysuria, frequency, hematuria and urgency  Musculoskeletal: Negative for arthralgias, gait problem and joint swelling  Skin: Negative for pallor and rash  Neurological: Negative for dizziness, syncope, speech difficulty, weakness, light-headedness, numbness and headaches  Hematological: Negative for adenopathy  Psychiatric/Behavioral: Negative for behavioral problems, confusion and sleep disturbance  The patient is not nervous/anxious            Current Outpatient Medications   Medication Sig Dispense Refill    albuterol (PROVENTIL HFA,VENTOLIN HFA) 90 mcg/act inhaler Inhale 2 puffs every 4 - 6 hours as needed      amLODIPine (NORVASC) 5 mg tablet Take 5 mg by mouth daily      aspirin 325 mg tablet Take 325 mg by mouth every other day      atorvastatin (LIPITOR) 20 mg tablet TAKE 1 TABLET(20 MG) BY MOUTH DAILY 90 tablet 3    celecoxib (CeleBREX) 200 mg capsule Take 1 capsule (200 mg total) by mouth daily as needed for mild pain 30 capsule 0    Cholecalciferol (VITAMIN D3 PO) Take 2,000 Units by mouth      clotrimazole-betamethasone (LOTRISONE) 1-0 05 % lotion Apply topically 2 (two) times a day (Patient taking differently: Apply topically 2 (two) times a day as needed) 30 mL 3    finasteride (PROSCAR) 5 mg tablet TAKE 1 TABLET(5 MG) BY MOUTH DAILY 90 tablet 3    hydrOXYzine HCL (ATARAX) 10 mg tablet Take 10 mg by mouth daily as needed       IRON, FERROUS SULFATE, PO Take by mouth every other day       mometasone (NASONEX) 50 mcg/act nasal spray USE 1 SPRAY IN EACH NOSTRIL AS NEEDED 17 g 3    Naproxen Sodium 220 MG CAPS Take by mouth as needed       omeprazole (PriLOSEC) 40 MG capsule TAKE 1 CAPSULE(40 MG) BY MOUTH DAILY 30 capsule 1    Pseudoephedrine-guaiFENesin (MUCINEX D PO) Take by mouth as needed      tamsulosin (FLOMAX) 0 4 mg Take 1 capsule (0 4 mg total) by mouth every other day 90 capsule 2    traMADol (ULTRAM) 50 mg tablet Take 1 tablet (50 mg total) by mouth every 8 (eight) hours as needed for moderate pain 30 tablet 0    vitamin B-12 (CYANOCOBALAMIN) 100 MCG TABS Take 2,000 mcg by mouth once a week        zolpidem (AMBIEN) 10 mg tablet Take 1 tablet (10 mg total) by mouth daily at bedtime as needed for sleep 1/2 to 1 PRN 30 tablet 0    AMOXICILLIN PO Take by mouth as needed (Patient not taking: Reported on 5/11/2022)      HYDROcodone-acetaminophen (XODOL) 5-300 MG per tablet Take 1 tablet by mouth every 6 (six) hours as needed for moderate painMax Daily Amount: 4 tablets (Patient not taking: Reported on 5/11/2022) 15 tablet 0    metaxalone (SKELAXIN) 800 mg tablet Take 800 mg by mouth as needed  (Patient not taking: Reported on 5/11/2022)      Pseudoephedrine HCl (SUDAFED PO) Take by mouth as needed       No current facility-administered medications for this visit  Objective:    /82   Pulse 79   Temp (!) 97 2 °F (36 2 °C) (Temporal)   Resp 12   Ht 5' 6" (1 676 m)   Wt 70 3 kg (155 lb)   SpO2 98%   BMI 25 02 kg/m²        Physical Exam  Constitutional:       Appearance: He is well-developed  HENT:      Head: Normocephalic and atraumatic  Eyes:      General:         Right eye: No discharge           Left eye: No discharge  Conjunctiva/sclera: Conjunctivae normal       Pupils: Pupils are equal, round, and reactive to light  Neck:      Thyroid: No thyromegaly  Vascular: No JVD  Cardiovascular:      Rate and Rhythm: Normal rate and regular rhythm  Heart sounds: Normal heart sounds  No murmur heard  Pulmonary:      Effort: Pulmonary effort is normal       Breath sounds: Normal breath sounds  No wheezing or rales  Abdominal:      General: Bowel sounds are normal       Palpations: Abdomen is soft  There is no mass  Tenderness: There is no abdominal tenderness  There is no guarding or rebound  Musculoskeletal:         General: No tenderness or deformity  Normal range of motion  Cervical back: Neck supple  Lymphadenopathy:      Cervical: No cervical adenopathy  Skin:     General: Skin is warm and dry  Findings: No erythema or rash  Neurological:      Mental Status: He is alert and oriented to person, place, and time  Psychiatric:         Behavior: Behavior normal          Thought Content:  Thought content normal          Judgment: Judgment normal                 Mike Wynne MD

## 2022-05-23 NOTE — TELEPHONE ENCOUNTER
LM on Cell relaying Dr Stone's message  Informed that he did put in an order for a US    If he wants to come in to pick it up or I left central scheduling's number if he is going to go to [place] [phone] Yes

## 2022-06-29 DIAGNOSIS — M15.9 PRIMARY OSTEOARTHRITIS INVOLVING MULTIPLE JOINTS: ICD-10-CM

## 2022-06-29 RX ORDER — CELECOXIB 200 MG/1
CAPSULE ORAL
Qty: 30 CAPSULE | Refills: 0 | Status: SHIPPED | OUTPATIENT
Start: 2022-06-29

## 2022-07-12 DIAGNOSIS — G47.9 SLEEP DISORDER: ICD-10-CM

## 2022-07-12 DIAGNOSIS — T84.498S FAILED ORTHOPEDIC IMPLANT, SEQUELA: ICD-10-CM

## 2022-07-13 RX ORDER — ZOLPIDEM TARTRATE 10 MG/1
10 TABLET ORAL
Qty: 30 TABLET | Refills: 0 | Status: SHIPPED | OUTPATIENT
Start: 2022-07-13

## 2022-07-13 RX ORDER — TRAMADOL HYDROCHLORIDE 50 MG/1
50 TABLET ORAL EVERY 8 HOURS PRN
Qty: 30 TABLET | Refills: 0 | Status: SHIPPED | OUTPATIENT
Start: 2022-07-13 | End: 2022-09-16 | Stop reason: SDUPTHER

## 2022-08-30 ENCOUNTER — TELEMEDICINE (OUTPATIENT)
Dept: FAMILY MEDICINE CLINIC | Facility: CLINIC | Age: 72
End: 2022-08-30
Payer: COMMERCIAL

## 2022-08-30 VITALS — BODY MASS INDEX: 24.91 KG/M2 | WEIGHT: 155 LBS | HEIGHT: 66 IN

## 2022-08-30 DIAGNOSIS — M15.9 PRIMARY OSTEOARTHRITIS INVOLVING MULTIPLE JOINTS: Primary | ICD-10-CM

## 2022-08-30 DIAGNOSIS — F34.1 DYSTHYMIA: ICD-10-CM

## 2022-08-30 PROBLEM — M25.552 HIP PAIN, BILATERAL: Status: RESOLVED | Noted: 2020-08-07 | Resolved: 2022-08-30

## 2022-08-30 PROBLEM — M25.551 HIP PAIN, BILATERAL: Status: RESOLVED | Noted: 2020-08-07 | Resolved: 2022-08-30

## 2022-08-30 PROBLEM — R05.3 PERSISTENT COUGH FOR 3 WEEKS OR LONGER: Status: RESOLVED | Noted: 2021-12-23 | Resolved: 2022-08-30

## 2022-08-30 PROBLEM — J34.3 HYPERTROPHY OF NASAL TURBINATES: Status: RESOLVED | Noted: 2019-06-11 | Resolved: 2022-08-30

## 2022-08-30 PROCEDURE — 99213 OFFICE O/P EST LOW 20 MIN: CPT | Performed by: FAMILY MEDICINE

## 2022-08-30 PROCEDURE — 1160F RVW MEDS BY RX/DR IN RCRD: CPT | Performed by: FAMILY MEDICINE

## 2022-08-30 NOTE — PROGRESS NOTES
Virtual Regular Visit    Verification of patient location:    Patient is located in the following state in which I hold an active license [place]      Assessment/Plan:    Problem List Items Addressed This Visit        Musculoskeletal and Integument    Osteoarthritis - Primary       Other    Dysthymia               Reason for visit is   Chief Complaint   Patient presents with    Follow-up     Pt wants to discuss work issue and disability paper work   Virtual Regular Visit        Encounter provider Samanta Jang MD    Provider located at [address] [place] [address] Street [address]      Recent Visits  No visits were found meeting these conditions  Showing recent visits within past 7 days and meeting all other requirements  Today's Visits  Date Type Provider Dept   08/30/22 Telemedicine Samanta Jang MD [facility]   Showing today's visits and meeting all other requirements  Future Appointments  No visits were found meeting these conditions  Showing future appointments within next 150 days and meeting all other requirements       The patient was identified by name and date of birth  Anastacia Vogt was informed that this is a telemedicine visit and that the visit is being conducted through [address] [place] [address] Now and patient was informed that this is a secure, HIPAA-compliant platform  He agrees to proceed     My office door was closed  No one else was in the room  He acknowledged consent and understanding of privacy and security of the video platform  The patient has agreed to participate and understands they can discontinue the visit at any time  Patient is aware this is a billable service  Subjective  Anastacia Vogt is a 74 y o  male            DISCUSSION      PATIENT CALLS TO OBTAIN A LETTER FOR WORK DESCRIBING CURRENT MEDICAL CONDITIONS WHICH MAY IMPACT HIS EMPLOYMENT  PATIENT HIGHLIGHTS SEVERAL ISSUES  FOCUSING ISSUES  DYSLEXIA CHRONIC OSTEOARTHRITIS    HE WOULD JUST LIKE TO HAVE THIS INFO ON FILE WITH WORK       History reviewed  No pertinent past medical history      Past Surgical History:   Procedure Laterality Date    HIP SURGERY Left     NISHA    HIP SURGERY Right     revision    KIDNEY STONE SURGERY      KNEE SURGERY Right     KNEE SURGERY Right     ligament    KNEE SURGERY Right     ligament    NASAL SINUS SURGERY      TONSILECTOMY AND ADNOIDECTOMY      TOTAL HIP ARTHROPLASTY Right        Current Outpatient Medications   Medication Sig Dispense Refill    albuterol (PROVENTIL HFA,VENTOLIN HFA) 90 mcg/act inhaler Inhale 2 puffs every 4 - 6 hours as needed      amLODIPine (NORVASC) 5 mg tablet Take 5 mg by mouth daily      AMOXICILLIN PO Take by mouth as needed PRN prior to a dental visit      aspirin 325 mg tablet Take 325 mg by mouth every other day      atorvastatin (LIPITOR) 20 mg tablet TAKE 1 TABLET(20 MG) BY MOUTH DAILY 90 tablet 3    celecoxib (CeleBREX) 200 mg capsule TAKE 1 CAPSULE(200 MG) BY MOUTH DAILY AS NEEDED FOR MILD PAIN 30 capsule 0    Cholecalciferol (VITAMIN D3 PO) Take 2,000 Units by mouth      clotrimazole-betamethasone (LOTRISONE) 1-0 05 % lotion Apply topically 2 (two) times a day (Patient taking differently: Apply topically 2 (two) times a day as needed) 30 mL 3    finasteride (PROSCAR) 5 mg tablet TAKE 1 TABLET(5 MG) BY MOUTH DAILY 90 tablet 3    HYDROcodone-acetaminophen (XODOL) 5-300 MG per tablet Take 1 tablet by mouth every 6 (six) hours as needed for moderate painMax Daily Amount: 4 tablets 15 tablet 0    hydrOXYzine HCL (ATARAX) 10 mg tablet Take 10 mg by mouth daily as needed       IRON, FERROUS SULFATE, PO Take by mouth every other day       mometasone (NASONEX) 50 mcg/act nasal spray USE 1 SPRAY IN EACH NOSTRIL AS NEEDED 17 g 3    Naproxen Sodium 220 MG CAPS Take by mouth as needed       omeprazole (PriLOSEC) 40 MG capsule TAKE 1 CAPSULE(40 MG) BY MOUTH DAILY 30 capsule 1    Pseudoephedrine HCl (SUDAFED PO) Take by mouth as needed      Pseudoephedrine-guaiFENesin (MUCINEX D PO) Take by mouth as needed      traMADol (ULTRAM) 50 mg tablet Take 1 tablet (50 mg total) by mouth every 8 (eight) hours as needed for moderate pain 30 tablet 0    vitamin B-12 (CYANOCOBALAMIN) 100 MCG TABS Take 2,000 mcg by mouth once a week        zolpidem (AMBIEN) 10 mg tablet Take 1 tablet (10 mg total) by mouth daily at bedtime as needed for sleep 1/2 to 1 PRN 30 tablet 0     No current facility-administered medications for this visit          Allergies   Allergen Reactions    Meloxicam Other (See Comments)     suicidial / itching     Medical Tape Itching       Review of Systems    Video Exam    Vitals:    08/30/22 0815   Weight: 70 3 kg (155 lb)   Height: 5' 6" (1 676 m)       Physical Exam     PATIENT VISUALLY APPEARS WELL IN NO OBVIOUS DISTRESS    I spent 20 minutes directly with the patient during this visit

## 2022-09-16 DIAGNOSIS — T84.498S FAILED ORTHOPEDIC IMPLANT, SEQUELA: ICD-10-CM

## 2022-09-16 RX ORDER — TRAMADOL HYDROCHLORIDE 50 MG/1
50 TABLET ORAL EVERY 8 HOURS PRN
Qty: 30 TABLET | Refills: 0 | Status: SHIPPED | OUTPATIENT
Start: 2022-09-16

## 2022-11-02 DIAGNOSIS — G47.9 SLEEP DISORDER: ICD-10-CM

## 2022-11-02 DIAGNOSIS — T84.498S FAILED ORTHOPEDIC IMPLANT, SEQUELA: ICD-10-CM

## 2022-11-02 NOTE — TELEPHONE ENCOUNTER
Fernando Glass had an appt on 11/11 but we had to reschedule due to Dr Jessica Decker not being here    Rescheduled for 11/18

## 2022-11-03 RX ORDER — ZOLPIDEM TARTRATE 10 MG/1
10 TABLET ORAL
Qty: 30 TABLET | Refills: 0 | Status: SHIPPED | OUTPATIENT
Start: 2022-11-03

## 2022-11-03 RX ORDER — TRAMADOL HYDROCHLORIDE 50 MG/1
50 TABLET ORAL EVERY 8 HOURS PRN
Qty: 30 TABLET | Refills: 0 | Status: SHIPPED | OUTPATIENT
Start: 2022-11-03

## 2022-11-15 ENCOUNTER — RA CDI HCC (OUTPATIENT)
Dept: OTHER | Facility: HOSPITAL | Age: 72
End: 2022-11-15

## 2022-11-15 NOTE — PROGRESS NOTES
[facility]  coding opportunities       Chart reviewed, no opportunity found: CHART REVIEWED, NO OPPORTUNITY FOUND        Patients Insurance        Commercial Insurance: [employer]

## 2022-11-18 ENCOUNTER — OFFICE VISIT (OUTPATIENT)
Dept: FAMILY MEDICINE CLINIC | Facility: CLINIC | Age: 72
End: 2022-11-18

## 2022-11-18 VITALS
HEART RATE: 80 BPM | OXYGEN SATURATION: 99 % | DIASTOLIC BLOOD PRESSURE: 82 MMHG | RESPIRATION RATE: 12 BRPM | TEMPERATURE: 97.6 F | HEIGHT: 66 IN | WEIGHT: 159 LBS | BODY MASS INDEX: 25.55 KG/M2 | SYSTOLIC BLOOD PRESSURE: 142 MMHG

## 2022-11-18 DIAGNOSIS — F34.1 DYSTHYMIA: ICD-10-CM

## 2022-11-18 DIAGNOSIS — M15.9 PRIMARY OSTEOARTHRITIS INVOLVING MULTIPLE JOINTS: ICD-10-CM

## 2022-11-18 DIAGNOSIS — G47.33 OBSTRUCTIVE SLEEP APNEA SYNDROME: ICD-10-CM

## 2022-11-18 DIAGNOSIS — R03.0 ELEVATED BLOOD-PRESSURE READING WITHOUT DIAGNOSIS OF HYPERTENSION: ICD-10-CM

## 2022-11-18 DIAGNOSIS — F11.20 CONTINUOUS OPIOID DEPENDENCE (HCC): ICD-10-CM

## 2022-11-18 DIAGNOSIS — K21.9 LPRD (LARYNGOPHARYNGEAL REFLUX DISEASE): ICD-10-CM

## 2022-11-18 DIAGNOSIS — E78.2 MIXED HYPERLIPIDEMIA: ICD-10-CM

## 2022-11-18 DIAGNOSIS — J31.0 CHRONIC RHINITIS: Primary | ICD-10-CM

## 2022-11-18 PROBLEM — R55 NEAR SYNCOPE: Status: RESOLVED | Noted: 2021-05-10 | Resolved: 2022-11-18

## 2022-11-18 PROBLEM — H65.21 RIGHT CHRONIC SEROUS OTITIS MEDIA: Status: RESOLVED | Noted: 2020-11-09 | Resolved: 2022-11-18

## 2022-11-18 NOTE — PROGRESS NOTES
Name: Efren Resendiz      : 1950      MRN: [id number]  Encounter Provider: Cheli Keen MD  Encounter Date: 2022   Encounter department: [facility]     1  Chronic rhinitis    2  Primary osteoarthritis involving multiple joints  Assessment & Plan:  STABLE  DENIES ANY JOINT SWELLING OR REDNESS  JOINT STIFFNESS PRESENT  PAIN MANAGEMENT ADEQUATE    - CONTINUE CURRENT MANAGEMENT  - MEDICATION AS DIRECTED  - CALL / RETURN IF SYMPTOMS WORSEN        3  Mixed hyperlipidemia  Assessment & Plan:  WATCHING CHOLESTEROL INTAKE  COMPLIANT WITH MEDICATION  NO CONCERNS    - CONTINUE TO MONITOR DIETARY CHOL INTAKE  - CONTINUE CURRENT MEDICATION  - ENCOURAGED PHYSICAL ACTIVITY          4  Elevated blood-pressure reading without diagnosis of hypertension  Assessment & Plan:  STABLE  DENIES ANY CP, SOB, PALPITATIONS, OR HEADACHE  NOTES NO WATER RETENTION  COMPLIANT WITH MEDICATION  NO CONCERNS    - CONTINUE CURRENT TREATMENT PLAN  - MONITOR DIETARY SODIUM INTAKE  - ENCOURAGE PHYSICAL ACTIVITY  - RV 3 MONTHS        5  Dysthymia    6  LPRD (laryngopharyngeal reflux disease)  Assessment & Plan:  STABLE  DENIES ANY CP, INDIGESTION, OR COUGH  NOTES NO MELENA OR HEMATOCHEZIA  NO HEMATEMESIS  COMPLIANT WITH MEDICATION  NO CONCERNS    - CONTINUE CURRENT TREATMENT PLAN  - MEDICATION AS PRESCRIBED  - AVOID CAFFEINE, ALCOHOL OR SMOKING        7  Obstructive sleep apnea syndrome    8  Continuous opioid dependence ([facility] )           Subjective      PATIENT RETURNS FOR ROUTINE EVALUATION OF PATIENT'S MEDICAL ISSUES    INDIVIDUAL MEDICAL ISSUES WITH THEIR CURRENT STATUS, ASSESSMENT AND PLANS ARE LISTED ABOVE        Review of Systems   Constitutional: Negative for chills, fatigue and fever  HENT: Negative for congestion, ear discharge, ear pain, mouth sores, postnasal drip, sore throat and trouble swallowing  Eyes: Negative for pain, discharge and visual disturbance     Respiratory: Negative for cough, shortness of breath and wheezing  Cardiovascular: Negative for chest pain, palpitations and leg swelling  Gastrointestinal: Negative for abdominal distention, abdominal pain, blood in stool, diarrhea and nausea  Endocrine: Negative for polydipsia, polyphagia and polyuria  Genitourinary: Negative for dysuria, frequency, hematuria and urgency  Musculoskeletal: Positive for arthralgias  Negative for gait problem and joint swelling  Skin: Negative for pallor and rash  Neurological: Negative for dizziness, syncope, speech difficulty, weakness, light-headedness, numbness and headaches  Hematological: Negative for adenopathy  Psychiatric/Behavioral: Negative for behavioral problems, confusion and sleep disturbance  The patient is not nervous/anxious          Current Outpatient Medications on File Prior to Visit   Medication Sig   • albuterol (PROVENTIL HFA,VENTOLIN HFA) 90 mcg/act inhaler Inhale 2 puffs every 4 - 6 hours as needed   • amLODIPine (NORVASC) 5 mg tablet Take 5 mg by mouth daily   • AMOXICILLIN PO Take by mouth as needed PRN prior to a dental visit   • aspirin 325 mg tablet Take 325 mg by mouth every other day   • atorvastatin (LIPITOR) 20 mg tablet TAKE 1 TABLET(20 MG) BY MOUTH DAILY   • celecoxib (CeleBREX) 200 mg capsule TAKE 1 CAPSULE(200 MG) BY MOUTH DAILY AS NEEDED FOR MILD PAIN   • Cholecalciferol (VITAMIN D3 PO) Take 2,000 Units by mouth   • clotrimazole-betamethasone (LOTRISONE) 1-0 05 % lotion Apply topically 2 (two) times a day (Patient taking differently: Apply topically 2 (two) times a day as needed)   • finasteride (PROSCAR) 5 mg tablet TAKE 1 TABLET(5 MG) BY MOUTH DAILY   • hydrOXYzine HCL (ATARAX) 10 mg tablet Take 10 mg by mouth daily as needed    • IRON, FERROUS SULFATE, PO Take by mouth every other day    • mometasone (NASONEX) 50 mcg/act nasal spray USE 1 SPRAY IN EACH NOSTRIL AS NEEDED   • Naproxen Sodium 220 MG CAPS Take by mouth as needed    • Pseudoephedrine HCl (SUDAFED PO) Take by mouth as needed   • Pseudoephedrine-guaiFENesin (MUCINEX D PO) Take by mouth as needed   • traMADol (ULTRAM) 50 mg tablet Take 1 tablet (50 mg total) by mouth every 8 (eight) hours as needed for moderate pain   • vitamin B-12 (CYANOCOBALAMIN) 100 MCG TABS Take 2,000 mcg by mouth once a week     • zolpidem (AMBIEN) 10 mg tablet Take 1 tablet (10 mg total) by mouth daily at bedtime as needed for sleep 1/2 to 1 PRN   • [DISCONTINUED] HYDROcodone-acetaminophen (XODOL) 5-300 MG per tablet Take 1 tablet by mouth every 6 (six) hours as needed for moderate painMax Daily Amount: 4 tablets (Patient not taking: Reported on 11/18/2022)   • [DISCONTINUED] omeprazole (PriLOSEC) 40 MG capsule TAKE 1 CAPSULE(40 MG) BY MOUTH DAILY       Objective     /82   Pulse 80   Temp 97 6 °F (36 4 °C) (Temporal)   Resp 12   Ht 5' 6" (1 676 m)   Wt 72 1 kg (159 lb)   SpO2 99%   BMI 25 66 kg/m²     Physical Exam  Constitutional:       Appearance: Normal appearance  He is well-developed, normal weight and well-nourished  HENT:      Head: Normocephalic and atraumatic  Eyes:      General:         Right eye: No discharge  Left eye: No discharge  Extraocular Movements: EOM normal       Conjunctiva/sclera: Conjunctivae normal       Pupils: Pupils are equal, round, and reactive to light  Neck:      Thyroid: No thyromegaly  Vascular: No JVD  Cardiovascular:      Rate and Rhythm: Normal rate and regular rhythm  Heart sounds: Normal heart sounds  No murmur heard  Pulmonary:      Effort: Pulmonary effort is normal       Breath sounds: Normal breath sounds  No wheezing or rales  Abdominal:      General: Bowel sounds are normal       Palpations: Abdomen is soft  There is no hepatosplenomegaly or mass  Tenderness: There is no abdominal tenderness  There is no CVA tenderness, guarding or rebound  Musculoskeletal:         General: No tenderness, deformity or edema  Normal range of motion  Cervical back: Neck supple  Comments: MODERATE DJD CHANGES     Lymphadenopathy:      Cervical: No cervical adenopathy  Upper Body:   No axillary adenopathy present  Skin:     General: Skin is warm and dry  Findings: No erythema or rash  Neurological:      General: No focal deficit present  Mental Status: He is alert and oriented to person, place, and time  Psychiatric:         Mood and Affect: Mood and affect and mood normal          Behavior: Behavior normal          Thought Content:  Thought content normal          Judgment: Judgment normal        David Sweeney MD

## 2022-11-21 ENCOUNTER — TELEPHONE (OUTPATIENT)
Dept: ADMINISTRATIVE | Facility: OTHER | Age: 72
End: 2022-11-21

## 2022-11-21 NOTE — TELEPHONE ENCOUNTER
----- Message from [employer] sent at 11/18/2022  1:40 PM EST -----  Regarding: CARE GAP REQUST - Immunizations  11/18/22 1:40 PM    Hello, our patient attached above has had Shingles, Pneumonia, Flu and Covid Immunization(s) completed/performed  Please assist in updating the patient chart by making an External outreach to [address] [place] [address] located in  [place], [place]  The date of service is 2022      Thank you,  Kavin De Santiago, CMA  [address]

## 2022-11-21 NOTE — LETTER
Vaccination Request Form: [id number] aka SARS-CoV-2 (Konrad Cullen or Bernardino Walker or J & J), Influenza, Pneumococcal (PCV13 and/or PPSV23 or PCV20) and Zoster      Date Requested: 22  Patient: Mary Ellen Grimes  Patient : 1950   Referring Provider: Brianna Norman MD       The above patient has informed us that they have had their   most recent COVID-19 aka SARS-CoV-2 (Konrad Cullen or Bernardino Walker or J & J), Influenza, Pneumococcal (PCV13 and/or PPSV23 or PCV20) and Zoster administered at your facility  Please   complete this form and attach all corresponding documentation  Date of Vaccine(s) Given  ______________________________    Lot Number(s) _______________________________________    Manufacture(s) ______________________________________    Dose Amount (s) _____________________________________    Expiration Date(s) ____________________________________    Comments __________________________________________________________  ____________________________________________________________________  ____________________________________________________________________  ____________________________________________________________________    Administering Facility  ________________________________________________    Vaccine Administered By (print name) ___________________________________      Form Completed By (print name) _______________________________________      Signature ___________________________________________________________      These reports are needed for  compliance  Please fax this completed form and a copy of the Vaccine Document(s) to our office located at [place] [address] as soon as possible to [phone] attention Marce: Phone [phone]    We thank you for your assistance in treating our mutual patient

## 2022-11-21 NOTE — LETTER
Vaccination Request Form:Booster COVID-19 aka SARS-CoV-2 (Moderna or [place] or J [employer] J), Influenza, Pneumococcal (PCV13 and/or PPSV23 or PCV20) and Zoster      Date Requested: 22  Patient: Gracy Parmar  Patient : 1950   Referring Provider: Jolly Villanueva MD       The above patient has informed us that they have had their   most recent Booster  COVID-19 aka SARS-CoV-2 (Balinda Cera or Lebron Aaron or J & J), Influenza, Pneumococcal (PCV13 and/or PPSV23 or PCV20) and Zoster administered at your facility  Please   complete this form and attach all corresponding documentation  Date of Vaccine(s) Given  ______________________________    Lot Number(s) _______________________________________    Manufacture(s) ______________________________________    Dose Amount (s) _____________________________________    Expiration Date(s) ____________________________________    Comments __________________________________________________________  ____________________________________________________________________  ____________________________________________________________________  ____________________________________________________________________    Administering Facility  ________________________________________________    Vaccine Administered By (print name) ___________________________________      Form Completed By (print name) _______________________________________      Signature ___________________________________________________________      These reports are needed for  compliance  Please fax this completed form and a copy of the Vaccine Document(s) to our office located at [place] [address] as soon as possible to [phone] attention Marce: Phone [phone]    We thank you for your assistance in treating our mutual patient

## 2022-11-21 NOTE — TELEPHONE ENCOUNTER
Upon review of the In Basket request and the patient's chart, initial outreach has been made via fax to facility  , please see Contacts section for details       Thank you  Pipe Lawler MA

## 2022-11-25 NOTE — TELEPHONE ENCOUNTER
As a follow-up, a second attempt has been made for outreach via fax to facility  Please see Contacts section for details      Thank you  Omkar Campbell MA

## 2022-12-02 DIAGNOSIS — T84.498S FAILED ORTHOPEDIC IMPLANT, SEQUELA: ICD-10-CM

## 2022-12-02 DIAGNOSIS — G47.9 SLEEP DISORDER: ICD-10-CM

## 2022-12-02 RX ORDER — ZOLPIDEM TARTRATE 10 MG/1
10 TABLET ORAL
Qty: 30 TABLET | Refills: 0 | Status: SHIPPED | OUTPATIENT
Start: 2022-12-02

## 2022-12-02 RX ORDER — TRAMADOL HYDROCHLORIDE 50 MG/1
50 TABLET ORAL EVERY 8 HOURS PRN
Qty: 30 TABLET | Refills: 0 | Status: SHIPPED | OUTPATIENT
Start: 2022-12-02

## 2022-12-02 NOTE — TELEPHONE ENCOUNTER
As a final attempt, a third outreach has been made via telephone call to facility  Please see Contacts section for details  This encounter will be closed and completed by end of day  Should we receive the requested information because of previous outreach attempts, the requested patient's chart will be updated appropriately       Thank you  Jordana Roldan MA

## 2022-12-09 ENCOUNTER — OFFICE VISIT (OUTPATIENT)
Dept: FAMILY MEDICINE CLINIC | Facility: CLINIC | Age: 72
End: 2022-12-09

## 2022-12-09 VITALS
RESPIRATION RATE: 12 BRPM | DIASTOLIC BLOOD PRESSURE: 80 MMHG | WEIGHT: 157 LBS | HEART RATE: 89 BPM | BODY MASS INDEX: 25.23 KG/M2 | OXYGEN SATURATION: 98 % | HEIGHT: 66 IN | SYSTOLIC BLOOD PRESSURE: 128 MMHG | TEMPERATURE: 97.6 F

## 2022-12-09 DIAGNOSIS — Z23 NEED FOR TDAP VACCINATION: ICD-10-CM

## 2022-12-09 DIAGNOSIS — Z23 NEED FOR INFLUENZA VACCINATION: ICD-10-CM

## 2022-12-09 DIAGNOSIS — J02.9 SORE THROAT: Primary | ICD-10-CM

## 2022-12-09 NOTE — PROGRESS NOTES
Assessment/Plan:    1  Sore throat  Comments:  Perform watchful waiting  Saline lavage for PND    2  Need for influenza vaccination  -     influenza vaccine, high-dose, PF 0 7 mL (FLUZONE HIGH-DOSE)    3  Need for Tdap vaccination  -     TDAP VACCINE GREATER THAN OR EQUAL TO 6YO IM            Return if symptoms worsen or fail to improve  Subjective:      Patient ID: Yen Dooley is a 74 y o  male  Chief Complaint   Patient presents with   • Cold Like Symptoms     Pt here for Sore Throat x 3 days        Sore Throat   This is a new problem  The current episode started in the past 7 days  The problem has been unchanged  There has been no fever  The pain is mild  Pertinent negatives include no abdominal pain, congestion, coughing, diarrhea, ear pain, plugged ear sensation or shortness of breath  He has tried nothing for the symptoms  The treatment provided no relief  The following portions of the patient's history were reviewed and updated as appropriate: allergies, current medications, past family history, past medical history, past social history, past surgical history and problem list     Review of Systems   Constitutional: Negative for chills, fatigue and fever  HENT: Positive for sore throat  Negative for congestion, ear pain, rhinorrhea, sinus pressure and sinus pain  Respiratory: Negative for cough and shortness of breath  Gastrointestinal: Negative for abdominal pain and diarrhea           Current Outpatient Medications   Medication Sig Dispense Refill   • albuterol (PROVENTIL HFA,VENTOLIN HFA) 90 mcg/act inhaler Inhale 2 puffs every 4 - 6 hours as needed     • amLODIPine (NORVASC) 5 mg tablet Take 5 mg by mouth daily     • AMOXICILLIN PO Take by mouth as needed PRN prior to a dental visit     • aspirin 325 mg tablet Take 325 mg by mouth every other day     • atorvastatin (LIPITOR) 20 mg tablet TAKE 1 TABLET(20 MG) BY MOUTH DAILY 90 tablet 3   • celecoxib (CeleBREX) 200 mg capsule TAKE 1 CAPSULE(200 MG) BY MOUTH DAILY AS NEEDED FOR MILD PAIN 30 capsule 0   • Cholecalciferol (VITAMIN D3 PO) Take 2,000 Units by mouth     • clotrimazole-betamethasone (LOTRISONE) 1-0 05 % lotion Apply topically 2 (two) times a day (Patient taking differently: Apply topically 2 (two) times a day as needed) 30 mL 3   • finasteride (PROSCAR) 5 mg tablet TAKE 1 TABLET(5 MG) BY MOUTH DAILY 90 tablet 3   • hydrOXYzine HCL (ATARAX) 10 mg tablet Take 10 mg by mouth daily as needed      • IRON, FERROUS SULFATE, PO Take by mouth every other day      • mometasone (NASONEX) 50 mcg/act nasal spray USE 1 SPRAY IN EACH NOSTRIL AS NEEDED 17 g 3   • Naproxen Sodium 220 MG CAPS Take by mouth as needed      • Pseudoephedrine HCl (SUDAFED PO) Take by mouth as needed     • Pseudoephedrine-guaiFENesin (MUCINEX D PO) Take by mouth as needed     • traMADol (ULTRAM) 50 mg tablet Take 1 tablet (50 mg total) by mouth every 8 (eight) hours as needed for moderate pain 30 tablet 0   • vitamin B-12 (CYANOCOBALAMIN) 100 MCG TABS Take 2,000 mcg by mouth once a week       • zolpidem (AMBIEN) 10 mg tablet Take 1 tablet (10 mg total) by mouth daily at bedtime as needed for sleep 1/2 to 1 PRN 30 tablet 0     No current facility-administered medications for this visit  Objective:    /80   Pulse 89   Temp 97 6 °F (36 4 °C) (Temporal)   Resp 12   Ht 5' 6" (1 676 m)   Wt 71 2 kg (157 lb)   SpO2 98%   BMI 25 34 kg/m²        Physical Exam  Vitals reviewed  Constitutional:       General: He is not in acute distress  Appearance: Normal appearance  He is well-developed  He is not diaphoretic  HENT:      Head: Normocephalic and atraumatic  Right Ear: Tympanic membrane, ear canal and external ear normal  No drainage, swelling or tenderness  No middle ear effusion  Left Ear: Tympanic membrane, ear canal and external ear normal  No drainage, swelling or tenderness  No middle ear effusion  Nose: No mucosal edema        Right Sinus: No maxillary sinus tenderness or frontal sinus tenderness  Left Sinus: No maxillary sinus tenderness or frontal sinus tenderness  Mouth/Throat:      Pharynx: Uvula midline  Posterior oropharyngeal erythema present  No oropharyngeal exudate  Eyes:      General:         Right eye: No discharge  Left eye: No discharge  Conjunctiva/sclera: Conjunctivae normal    Neck:      Thyroid: No thyromegaly  Cardiovascular:      Rate and Rhythm: Normal rate and regular rhythm  Heart sounds: Normal heart sounds  Pulmonary:      Effort: Pulmonary effort is normal  No respiratory distress  Breath sounds: Normal breath sounds  No decreased breath sounds, wheezing, rhonchi or rales  Musculoskeletal:      Cervical back: Normal range of motion and neck supple  Lymphadenopathy:      Cervical: No cervical adenopathy  Skin:     General: Skin is warm and dry  Findings: No rash  Neurological:      Mental Status: He is alert and oriented to person, place, and time  Psychiatric:         Behavior: Behavior normal          Thought Content:  Thought content normal                 CROW Tyler

## 2022-12-29 DIAGNOSIS — E78.2 MIXED HYPERLIPIDEMIA: ICD-10-CM

## 2022-12-29 DIAGNOSIS — N40.0 BENIGN PROSTATIC HYPERPLASIA, UNSPECIFIED WHETHER LOWER URINARY TRACT SYMPTOMS PRESENT: ICD-10-CM

## 2022-12-29 DIAGNOSIS — R03.0 ELEVATED BLOOD-PRESSURE READING WITHOUT DIAGNOSIS OF HYPERTENSION: ICD-10-CM

## 2022-12-29 DIAGNOSIS — Z13.29 SCREENING FOR THYROID DISORDER: ICD-10-CM

## 2022-12-29 DIAGNOSIS — Z00.00 ROUTINE GENERAL MEDICAL EXAMINATION AT A HEALTH CARE FACILITY: Primary | ICD-10-CM

## 2022-12-29 DIAGNOSIS — K21.9 LPRD (LARYNGOPHARYNGEAL REFLUX DISEASE): ICD-10-CM

## 2023-01-05 DIAGNOSIS — T84.498S FAILED ORTHOPEDIC IMPLANT, SEQUELA: ICD-10-CM

## 2023-01-05 RX ORDER — TRAMADOL HYDROCHLORIDE 50 MG/1
50 TABLET ORAL EVERY 8 HOURS PRN
Qty: 30 TABLET | Refills: 0 | Status: SHIPPED | OUTPATIENT
Start: 2023-01-05

## 2023-01-13 DIAGNOSIS — E78.01 FAMILIAL HYPERCHOLESTEROLEMIA: ICD-10-CM

## 2023-01-13 DIAGNOSIS — N40.0 PROSTATIC HYPERTROPHY: ICD-10-CM

## 2023-01-13 RX ORDER — FINASTERIDE 5 MG/1
TABLET, FILM COATED ORAL
Qty: 90 TABLET | Refills: 3 | Status: SHIPPED | OUTPATIENT
Start: 2023-01-13

## 2023-01-13 RX ORDER — ATORVASTATIN CALCIUM 20 MG/1
TABLET, FILM COATED ORAL
Qty: 90 TABLET | Refills: 3 | Status: SHIPPED | OUTPATIENT
Start: 2023-01-13

## 2023-01-27 LAB
BASOPHILS # BLD AUTO: 0.1 X10E3/UL (ref 0–0.2)
BASOPHILS NFR BLD AUTO: 1 %
EOSINOPHIL # BLD AUTO: 0.4 X10E3/UL (ref 0–0.4)
EOSINOPHIL NFR BLD AUTO: 8 %
ERYTHROCYTE [DISTWIDTH] IN BLOOD BY AUTOMATED COUNT: 13.2 % (ref 11.6–15.4)
HCT VFR BLD AUTO: 48.4 % (ref 37.5–51)
HGB BLD-MCNC: 16.6 G/DL (ref 13–17.7)
IMM GRANULOCYTES # BLD: 0 X10E3/UL (ref 0–0.1)
IMM GRANULOCYTES NFR BLD: 0 %
LYMPHOCYTES # BLD AUTO: 1.1 X10E3/UL (ref 0.7–3.1)
LYMPHOCYTES NFR BLD AUTO: 20 %
MCH RBC QN AUTO: 29.9 PG (ref 26.6–33)
MCHC RBC AUTO-ENTMCNC: 34.3 G/DL (ref 31.5–35.7)
MCV RBC AUTO: 87 FL (ref 79–97)
MONOCYTES # BLD AUTO: 0.4 X10E3/UL (ref 0.1–0.9)
MONOCYTES NFR BLD AUTO: 7 %
NEUTROPHILS # BLD AUTO: 3.4 X10E3/UL (ref 1.4–7)
NEUTROPHILS NFR BLD AUTO: 64 %
PLATELET # BLD AUTO: 171 X10E3/UL (ref 150–450)
RBC # BLD AUTO: 5.55 X10E6/UL (ref 4.14–5.8)
WBC # BLD AUTO: 5.4 X10E3/UL (ref 3.4–10.8)

## 2023-01-28 LAB
ALBUMIN SERPL-MCNC: 5 G/DL (ref 3.7–4.7)
ALBUMIN/GLOB SERPL: 2.9 {RATIO} (ref 1.2–2.2)
ALP SERPL-CCNC: 99 IU/L (ref 44–121)
ALT SERPL-CCNC: 14 IU/L (ref 0–44)
AST SERPL-CCNC: 21 IU/L (ref 0–40)
BILIRUB SERPL-MCNC: 0.9 MG/DL (ref 0–1.2)
BUN SERPL-MCNC: 21 MG/DL (ref 8–27)
BUN/CREAT SERPL: 21 (ref 10–24)
CALCIUM SERPL-MCNC: 10.1 MG/DL (ref 8.6–10.2)
CHLORIDE SERPL-SCNC: 101 MMOL/L (ref 96–106)
CHOLEST SERPL-MCNC: 185 MG/DL (ref 100–199)
CO2 SERPL-SCNC: 24 MMOL/L (ref 20–29)
CREAT SERPL-MCNC: 0.98 MG/DL (ref 0.76–1.27)
EGFR: 81 ML/MIN/1.73
GLOBULIN SER-MCNC: 1.7 G/DL (ref 1.5–4.5)
GLUCOSE SERPL-MCNC: 81 MG/DL (ref 70–99)
HDLC SERPL-MCNC: 83 MG/DL
LDLC SERPL CALC-MCNC: 87 MG/DL (ref 0–99)
POTASSIUM SERPL-SCNC: 5.2 MMOL/L (ref 3.5–5.2)
PROT SERPL-MCNC: 6.7 G/DL (ref 6–8.5)
PSA FREE MFR SERPL: 15.4 %
PSA FREE SERPL-MCNC: 0.2 NG/ML
PSA SERPL-MCNC: 1.3 NG/ML (ref 0–4)
SL AMB VLDL CHOLESTEROL CALC: 15 MG/DL (ref 5–40)
SODIUM SERPL-SCNC: 141 MMOL/L (ref 134–144)
TRIGL SERPL-MCNC: 80 MG/DL (ref 0–149)
TSH SERPL DL<=0.005 MIU/L-ACNC: 1.51 UIU/ML (ref 0.45–4.5)

## 2023-02-13 ENCOUNTER — OFFICE VISIT (OUTPATIENT)
Dept: FAMILY MEDICINE CLINIC | Facility: CLINIC | Age: 73
End: 2023-02-13

## 2023-02-13 VITALS
DIASTOLIC BLOOD PRESSURE: 78 MMHG | SYSTOLIC BLOOD PRESSURE: 120 MMHG | OXYGEN SATURATION: 97 % | HEART RATE: 90 BPM | RESPIRATION RATE: 12 BRPM | WEIGHT: 158 LBS | HEIGHT: 66 IN | TEMPERATURE: 97.9 F | BODY MASS INDEX: 25.39 KG/M2

## 2023-02-13 DIAGNOSIS — F11.20 CONTINUOUS OPIOID DEPENDENCE (HCC): ICD-10-CM

## 2023-02-13 DIAGNOSIS — G47.33 OBSTRUCTIVE SLEEP APNEA SYNDROME: ICD-10-CM

## 2023-02-13 DIAGNOSIS — Z12.11 COLON CANCER SCREENING: ICD-10-CM

## 2023-02-13 DIAGNOSIS — E78.2 MIXED HYPERLIPIDEMIA: ICD-10-CM

## 2023-02-13 DIAGNOSIS — J30.1 SEASONAL ALLERGIC RHINITIS DUE TO POLLEN: ICD-10-CM

## 2023-02-13 DIAGNOSIS — M15.9 PRIMARY OSTEOARTHRITIS INVOLVING MULTIPLE JOINTS: ICD-10-CM

## 2023-02-13 DIAGNOSIS — K21.9 LPRD (LARYNGOPHARYNGEAL REFLUX DISEASE): ICD-10-CM

## 2023-02-13 DIAGNOSIS — Z00.00 ROUTINE GENERAL MEDICAL EXAMINATION AT A HEALTH CARE FACILITY: Primary | ICD-10-CM

## 2023-02-13 DIAGNOSIS — R03.0 ELEVATED BLOOD-PRESSURE READING WITHOUT DIAGNOSIS OF HYPERTENSION: ICD-10-CM

## 2023-02-13 LAB — SL AMB POCT FECES OCC BLD: NORMAL

## 2023-02-13 NOTE — PATIENT INSTRUCTIONS
DISCUSSED HEALTH MAINTENENCE ISSUES  BW WILL BE REVIEWED  ENCOURAGED HEALTHY DIET AND EXERCISE  COLONOSCOPY WILL BE ORDERED  RV FOR ANNUAL HEALTH EXAM IN 1 YEAR  RV SOONER IF THERE ARE ANY CONCERNS      Recent Results (from the past 672 hour(s))   Jose Ureña Default    Collection Time: 01/27/23 11:24 AM   Result Value Ref Range    White Blood Cell Count 5 4 3 4 - 10 8 x10E3/uL    Red Blood Cell Count 5 55 4 14 - 5 80 x10E6/uL    Hemoglobin 16 6 13 0 - 17 7 g/dL    HCT 48 4 37 5 - 51 0 %    MCV 87 79 - 97 fL    MCH 29 9 26 6 - 33 0 pg    MCHC 34 3 31 5 - 35 7 g/dL    RDW 13 2 11 6 - 15 4 %    Platelet Count 200 305 - 450 x10E3/uL    Neutrophils 64 Not Estab  %    Lymphocytes 20 Not Estab  %    Monocytes 7 Not Estab  %    Eosinophils 8 Not Estab  %    Basophils PCT 1 Not Estab  %    Neutrophils (Absolute) 3 4 1 4 - 7 0 x10E3/uL    Lymphocytes (Absolute) 1 1 0 7 - 3 1 x10E3/uL    Monocytes (Absolute) 0 4 0 1 - 0 9 x10E3/uL    Eosinophils (Absolute) 0 4 0 0 - 0 4 x10E3/uL    Basophils ABS 0 1 0 0 - 0 2 x10E3/uL    Immature Granulocytes 0 Not Estab  %    Immature Granulocytes (Absolute) 0 0 0 0 - 0 1 x10E3/uL   Comprehensive metabolic panel    Collection Time: 01/27/23 11:24 AM   Result Value Ref Range    Glucose, Random 81 70 - 99 mg/dL    BUN 21 8 - 27 mg/dL    Creatinine 0 98 0 76 - 1 27 mg/dL    eGFR 81 >59 mL/min/1 73    SL AMB BUN/CREATININE RATIO 21 10 - 24    Sodium 141 134 - 144 mmol/L    Potassium 5 2 3 5 - 5 2 mmol/L    Chloride 101 96 - 106 mmol/L    CO2 24 20 - 29 mmol/L    CALCIUM 10 1 8 6 - 10 2 mg/dL    Protein, Total 6 7 6 0 - 8 5 g/dL    Albumin 5 0 (H) 3 7 - 4 7 g/dL    Globulin, Total 1 7 1 5 - 4 5 g/dL    Albumin/Globulin Ratio 2 9 (H) 1 2 - 2 2    TOTAL BILIRUBIN 0 9 0 0 - 1 2 mg/dL    Alk Phos Isoenzymes 99 44 - 121 IU/L    AST 21 0 - 40 IU/L    ALT 14 0 - 44 IU/L   Lipid panel    Collection Time: 01/27/23 11:24 AM   Result Value Ref Range    Cholesterol, Total 185 100 - 199 mg/dL Triglycerides 80 0 - 149 mg/dL    HDL 83 >39 mg/dL    VLDL Cholesterol Calculated 15 5 - 40 mg/dL    LDL Calculated 87 0 - 99 mg/dL   PSA, total and free    Collection Time: 01/27/23 11:24 AM   Result Value Ref Range    Prostate Specific Antigen Total 1 3 0 0 - 4 0 ng/mL    PSA, Free 0 20 N/A ng/mL    PSA, Free Pct 15 4 %   TSH, 3rd generation    Collection Time: 01/27/23 11:24 AM   Result Value Ref Range    TSH 1 510 0 450 - 4 500 uIU/mL

## 2023-02-13 NOTE — PROGRESS NOTES
BMI Counseling: There is no height or weight on file to calculate BMI  The BMI is above normal  Nutrition recommendations include encouraging healthy choices of fruits and vegetables and moderation in carbohydrate intake  Exercise recommendations include exercising 3-5 times per week  No pharmacotherapy was ordered  Rationale for BMI follow-up plan is due to patient being overweight or obese  FAMILY PRACTICE HEALTH MAINTENANCE OFFICE VISIT  [facility] [place] [id number] PHYSICIANS    NAME: Simran Krishnan  AGE: 74 y o  SEX: male  : 1950     DATE: 2023    Assessment and Plan     Problem List Items Addressed This Visit        Digestive    LPRD (laryngopharyngeal reflux disease)       Respiratory    Obstructive sleep apnea syndrome    Seasonal allergic rhinitis due to pollen       Musculoskeletal and Integument    Osteoarthritis       Other    Mixed hyperlipidemia    Continuous opioid dependence (HCC)    Elevated blood-pressure reading without diagnosis of hypertension    Relevant Orders    POCT ECG (Completed)   Other Visit Diagnoses     Routine general medical examination at a health care facility    -  Primary    Colon cancer screening        Relevant Orders    POCT hemoccult screening (Completed)               Return in about 6 months (around 2023) for Recheck  Chief Complaint     Chief Complaint   Patient presents with   • Annual Exam       History of Present Illness     [address] [place] [address] Rd RECORD  NO CONCERNS AT THIS TIME        Well Adult Physical   Patient here for a comprehensive physical exam       Diet and Physical Activity  Diet: well balanced diet  Weight concerns: Patient is overweight (BMI 25 0-29  9)  Exercise: occasionally      Depression Screen  PHQ-2/9 Depression Screening    Little interest or pleasure in doing things: 1 - several days  Feeling down, depressed, or hopeless: 1 - several days  Trouble falling or staying asleep, or sleeping too much: 0 - not at all  Feeling tired or having little energy: 1 - several days  Poor appetite or overeatin - not at all  Feeling bad about yourself - or that you are a failure or have let yourself or your family down: 2 - more than half the days  Trouble concentrating on things, such as reading the newspaper or watching television: 1 - several days  Moving or speaking so slowly that other people could have noticed  Or the opposite - being so fidgety or restless that you have been moving around a lot more than usual: 0 - not at all  Thoughts that you would be better off dead, or of hurting yourself in some way: 0 - not at all  PHQ-9 Score: 6   PHQ-9 Interpretation: Mild depression           General Health  Hearing: Normal:  bilateral  Vision: no vision problems  Dental: regular dental visits    Reproductive Health          The following portions of the patient's history were reviewed and updated as appropriate: allergies, current medications, past family history, past medical history, past social history, past surgical history and problem list     Review of Systems     Review of Systems   Constitutional: Negative for chills, fatigue and fever  HENT: Negative for congestion, ear discharge, ear pain, mouth sores, postnasal drip, sore throat and trouble swallowing  Eyes: Negative for pain, discharge and visual disturbance  Respiratory: Negative for cough, shortness of breath and wheezing  Cardiovascular: Negative for chest pain, palpitations and leg swelling  Gastrointestinal: Negative for abdominal distention, abdominal pain, blood in stool, diarrhea and nausea  Endocrine: Negative for polydipsia, polyphagia and polyuria  Genitourinary: Negative for dysuria, frequency, hematuria and urgency  Musculoskeletal: Negative for arthralgias, gait problem and joint swelling  Skin: Negative for pallor and rash     Neurological: Negative for dizziness, syncope, speech difficulty, weakness, light-headedness, numbness and headaches  Hematological: Negative for adenopathy  Psychiatric/Behavioral: Negative for behavioral problems, confusion and sleep disturbance  The patient is not nervous/anxious  Past Medical History     History reviewed  No pertinent past medical history  Past Surgical History     Past Surgical History:   Procedure Laterality Date   • HIP SURGERY Left     NISHA   • HIP SURGERY Right     revision   • KIDNEY STONE SURGERY     • KNEE SURGERY Right    • KNEE SURGERY Right     ligament   • KNEE SURGERY Right     ligament   • NASAL SINUS SURGERY     • TONSILECTOMY AND ADNOIDECTOMY     • TOTAL HIP ARTHROPLASTY Right        Social History     Social History     Socioeconomic History   • Marital status: /Civil Union     Spouse name: None   • Number of children: None   • Years of education: None   • Highest education level: None   Occupational History   • None   Tobacco Use   • Smoking status: Never   • Smokeless tobacco: Never   Vaping Use   • Vaping Use: Never used   Substance and Sexual Activity   • Alcohol use:  Yes   • Drug use: Not Currently   • Sexual activity: Yes     Partners: Female     Birth control/protection: None   Other Topics Concern   • None   Social History Narrative   • None     Social Determinants of Health     Financial Resource Strain: Not on file   Food Insecurity: Not on file   Transportation Needs: Not on file   Physical Activity: Not on file   Stress: Not on file   Social Connections: Not on file   Intimate Partner Violence: Not on file   Housing Stability: Not on file       Family History     Family History   Problem Relation Age of Onset   • Cancer Mother    • Arthritis Mother    • Prostate cancer Father    • Hyperlipidemia Father    • Hypertension Father    • Prostate cancer Paternal Uncle    • Mental illness Neg Hx    • Substance Abuse Neg Hx        Current Medications       Current Outpatient Medications:   •  albuterol (Doreatha Hail HFA) 90 mcg/act inhaler, Inhale 2 puffs every 4 - 6 hours as needed, Disp: , Rfl:   •  amLODIPine (NORVASC) 5 mg tablet, Take 5 mg by mouth daily, Disp: , Rfl:   •  AMOXICILLIN PO, Take by mouth as needed PRN prior to a dental visit, Disp: , Rfl:   •  atorvastatin (LIPITOR) 20 mg tablet, TAKE 1 TABLET(20 MG) BY MOUTH DAILY, Disp: 90 tablet, Rfl: 3  •  celecoxib (CeleBREX) 200 mg capsule, TAKE 1 CAPSULE(200 MG) BY MOUTH DAILY AS NEEDED FOR MILD PAIN, Disp: 30 capsule, Rfl: 0  •  Cholecalciferol (VITAMIN D3 PO), Take 2,000 Units by mouth, Disp: , Rfl:   •  clotrimazole-betamethasone (LOTRISONE) 1-0 05 % lotion, Apply topically 2 (two) times a day (Patient taking differently: Apply topically 2 (two) times a day as needed), Disp: 30 mL, Rfl: 3  •  finasteride (PROSCAR) 5 mg tablet, TAKE 1 TABLET(5 MG) BY MOUTH DAILY, Disp: 90 tablet, Rfl: 3  •  hydrOXYzine HCL (ATARAX) 10 mg tablet, Take 10 mg by mouth daily as needed , Disp: , Rfl:   •  IRON, FERROUS SULFATE, PO, Take by mouth every other day , Disp: , Rfl:   •  mometasone (NASONEX) 50 mcg/act nasal spray, USE 1 SPRAY IN EACH NOSTRIL AS NEEDED, Disp: 17 g, Rfl: 3  •  Naproxen Sodium 220 MG CAPS, Take by mouth as needed , Disp: , Rfl:   •  Pseudoephedrine HCl (SUDAFED PO), Take by mouth as needed, Disp: , Rfl:   •  Pseudoephedrine-guaiFENesin (MUCINEX D PO), Take by mouth as needed, Disp: , Rfl:   •  traMADol (ULTRAM) 50 mg tablet, Take 1 tablet (50 mg total) by mouth every 8 (eight) hours as needed for moderate pain, Disp: 30 tablet, Rfl: 0  •  vitamin B-12 (CYANOCOBALAMIN) 100 MCG TABS, Take 2,000 mcg by mouth once a week  , Disp: , Rfl:   •  zolpidem (AMBIEN) 10 mg tablet, Take 1 tablet (10 mg total) by mouth daily at bedtime as needed for sleep 1/2 to 1 PRN, Disp: 30 tablet, Rfl: 0     Allergies     Allergies   Allergen Reactions   • Meloxicam Other (See Comments)     suicidial / itching    • Medical Tape Itching       Objective     /78   Pulse 90   Temp 97 9 °F (36 6 °C) (Temporal)   Resp 12   Ht 5' 6" (1 676 m)   Wt 71 7 kg (158 lb)   SpO2 97%   BMI 25 50 kg/m²      Physical Exam  Constitutional:       General: He is not in acute distress  Appearance: Normal appearance  He is well-developed and normal weight  He is not ill-appearing  HENT:      Head: Normocephalic and atraumatic  Right Ear: External ear normal       Left Ear: External ear normal       Nose: Nose normal    Eyes:      General:         Right eye: No discharge  Left eye: No discharge  Conjunctiva/sclera: Conjunctivae normal       Pupils: Pupils are equal, round, and reactive to light  Neck:      Thyroid: No thyromegaly  Vascular: No JVD  Cardiovascular:      Rate and Rhythm: Normal rate and regular rhythm  Heart sounds: Normal heart sounds  No murmur heard  Pulmonary:      Effort: Pulmonary effort is normal       Breath sounds: Normal breath sounds  No wheezing or rales  Abdominal:      General: Bowel sounds are normal       Palpations: Abdomen is soft  There is no mass  Tenderness: There is no abdominal tenderness  There is no guarding or rebound  Genitourinary:     Penis: Normal        Prostate: Normal       Rectum: Normal  Guaiac result negative  Musculoskeletal:         General: No tenderness or deformity  Normal range of motion  Cervical back: Neck supple  Comments: MODERATE DJD CHANGES     Lymphadenopathy:      Cervical: No cervical adenopathy  Skin:     General: Skin is warm and dry  Findings: No erythema or rash  Neurological:      General: No focal deficit present  Mental Status: He is alert and oriented to person, place, and time  Cranial Nerves: No cranial nerve deficit  Sensory: No sensory deficit  Motor: No weakness or abnormal muscle tone  Coordination: Coordination normal       Gait: Gait normal       Deep Tendon Reflexes: Reflexes are normal and symmetric   Reflexes normal    Psychiatric: Mood and Affect: Mood normal          Behavior: Behavior normal          Thought Content: Thought content normal          Judgment: Judgment normal            No results found      Health Maintenance     Health Maintenance   Topic Date Due   • COVID-19 Vaccine (4 - Booster for Pfizer series) 11/25/2021   • BMI: Followup Plan  02/13/2023   • Depression Remission PHQ  08/13/2023   • Fall Risk  02/13/2024   • BMI: Adult  02/13/2024   • Annual Physical  02/13/2024   • Colorectal Cancer Screening  08/18/2025   • DTaP,Tdap,and Td Vaccines (2 - Td or Tdap) 12/09/2032   • Hepatitis C Screening  Completed   • Pneumococcal Vaccine: 65+ Years  Completed   • Influenza Vaccine  Completed   • HIB Vaccine  Aged Out   • IPV Vaccine  Aged Out   • Hepatitis A Vaccine  Aged Out   • Meningococcal ACWY Vaccine  Aged Out   • HPV Vaccine  Aged Out     Immunization History   Administered Date(s) Administered   • COVID-19 PFIZER VACCINE 0 3 ML IM 03/01/2021, 03/22/2021, 09/30/2021   • INFLUENZA 09/20/2021   • Influenza, high dose seasonal 0 7 mL 10/22/2020, 12/09/2022   • Pneumococcal Conjugate 13-Valent 12/18/2015, 11/01/2017   • Pneumococcal Polysaccharide PPV23 11/20/2019   • Tdap 12/09/2022   • influenza, trivalent, adjuvanted 09/30/2021       Zach Buck MD  [facility]

## 2023-02-13 NOTE — LETTER
CIERRA VAUGHN      Current Outpatient Medications:   •  albuterol (PROVENTIL HFA,VENTOLIN HFA) 90 mcg/act inhaler, Inhale 2 puffs every 4 - 6 hours as needed, Disp: , Rfl:   •  amLODIPine (NORVASC) 5 mg tablet, Take 5 mg by mouth daily, Disp: , Rfl:   •  AMOXICILLIN PO, Take by mouth as needed PRN prior to a dental visit, Disp: , Rfl:   •  aspirin 325 mg tablet, Take 325 mg by mouth every other day, Disp: , Rfl:   •  atorvastatin (LIPITOR) 20 mg tablet, TAKE 1 TABLET(20 MG) BY MOUTH DAILY, Disp: 90 tablet, Rfl: 3  •  celecoxib (CeleBREX) 200 mg capsule, TAKE 1 CAPSULE(200 MG) BY MOUTH DAILY AS NEEDED FOR MILD PAIN, Disp: 30 capsule, Rfl: 0  •  Cholecalciferol (VITAMIN D3 PO), Take 2,000 Units by mouth, Disp: , Rfl:   •  clotrimazole-betamethasone (LOTRISONE) 1-0 05 % lotion, Apply topically 2 (two) times a day (Patient taking differently: Apply topically 2 (two) times a day as needed), Disp: 30 mL, Rfl: 3  •  finasteride (PROSCAR) 5 mg tablet, TAKE 1 TABLET(5 MG) BY MOUTH DAILY, Disp: 90 tablet, Rfl: 3  •  hydrOXYzine HCL (ATARAX) 10 mg tablet, Take 10 mg by mouth daily as needed , Disp: , Rfl:   •  IRON, FERROUS SULFATE, PO, Take by mouth every other day , Disp: , Rfl:   •  mometasone (NASONEX) 50 mcg/act nasal spray, USE 1 SPRAY IN EACH NOSTRIL AS NEEDED, Disp: 17 g, Rfl: 3  •  Naproxen Sodium 220 MG CAPS, Take by mouth as needed , Disp: , Rfl:   •  Pseudoephedrine HCl (SUDAFED PO), Take by mouth as needed, Disp: , Rfl:   •  Pseudoephedrine-guaiFENesin (MUCINEX D PO), Take by mouth as needed, Disp: , Rfl:   •  traMADol (ULTRAM) 50 mg tablet, Take 1 tablet (50 mg total) by mouth every 8 (eight) hours as needed for moderate pain, Disp: 30 tablet, Rfl: 0  •  vitamin B-12 (CYANOCOBALAMIN) 100 MCG TABS, Take 2,000 mcg by mouth once a week  , Disp: , Rfl:   •  zolpidem (AMBIEN) 10 mg tablet, Take 1 tablet (10 mg total) by mouth daily at bedtime as needed for sleep 1/2 to 1 PRN, Disp: 30 tablet, Rfl: 0      Recent Results (from the past 672 hour(s))   Jose Ureña Default    Collection Time: 01/27/23 11:24 AM   Result Value Ref Range    White Blood Cell Count 5 4 3 4 - 10 8 x10E3/uL    Red Blood Cell Count 5 55 4 14 - 5 80 x10E6/uL    Hemoglobin 16 6 13 0 - 17 7 g/dL    HCT 48 4 37 5 - 51 0 %    MCV 87 79 - 97 fL    MCH 29 9 26 6 - 33 0 pg    MCHC 34 3 31 5 - 35 7 g/dL    RDW 13 2 11 6 - 15 4 %    Platelet Count 025 807 - 450 x10E3/uL    Neutrophils 64 Not Estab  %    Lymphocytes 20 Not Estab  %    Monocytes 7 Not Estab  %    Eosinophils 8 Not Estab  %    Basophils PCT 1 Not Estab  %    Neutrophils (Absolute) 3 4 1 4 - 7 0 x10E3/uL    Lymphocytes (Absolute) 1 1 0 7 - 3 1 x10E3/uL    Monocytes (Absolute) 0 4 0 1 - 0 9 x10E3/uL    Eosinophils (Absolute) 0 4 0 0 - 0 4 x10E3/uL    Basophils ABS 0 1 0 0 - 0 2 x10E3/uL    Immature Granulocytes 0 Not Estab  %    Immature Granulocytes (Absolute) 0 0 0 0 - 0 1 x10E3/uL   Comprehensive metabolic panel    Collection Time: 01/27/23 11:24 AM   Result Value Ref Range    Glucose, Random 81 70 - 99 mg/dL    BUN 21 8 - 27 mg/dL    Creatinine 0 98 0 76 - 1 27 mg/dL    eGFR 81 >59 mL/min/1 73    SL AMB BUN/CREATININE RATIO 21 10 - 24    Sodium 141 134 - 144 mmol/L    Potassium 5 2 3 5 - 5 2 mmol/L    Chloride 101 96 - 106 mmol/L    CO2 24 20 - 29 mmol/L    CALCIUM 10 1 8 6 - 10 2 mg/dL    Protein, Total 6 7 6 0 - 8 5 g/dL    Albumin 5 0 (H) 3 7 - 4 7 g/dL    Globulin, Total 1 7 1 5 - 4 5 g/dL    Albumin/Globulin Ratio 2 9 (H) 1 2 - 2 2    TOTAL BILIRUBIN 0 9 0 0 - 1 2 mg/dL    Alk Phos Isoenzymes 99 44 - 121 IU/L    AST 21 0 - 40 IU/L    ALT 14 0 - 44 IU/L   Lipid panel    Collection Time: 01/27/23 11:24 AM   Result Value Ref Range    Cholesterol, Total 185 100 - 199 mg/dL    Triglycerides 80 0 - 149 mg/dL    HDL 83 >39 mg/dL    VLDL Cholesterol Calculated 15 5 - 40 mg/dL    LDL Calculated 87 0 - 99 mg/dL   PSA, total and free    Collection Time: 01/27/23 11:24 AM   Result Value Ref Range Prostate Specific Antigen Total 1 3 0 0 - 4 0 ng/mL    PSA, Free 0 20 N/A ng/mL    PSA, Free Pct 15 4 %   TSH, 3rd generation    Collection Time: 01/27/23 11:24 AM   Result Value Ref Range    TSH 1 510 0 450 - 4 500 uIU/mL

## 2023-02-20 DIAGNOSIS — T84.498S FAILED ORTHOPEDIC IMPLANT, SEQUELA: ICD-10-CM

## 2023-02-20 DIAGNOSIS — M15.9 PRIMARY OSTEOARTHRITIS INVOLVING MULTIPLE JOINTS: ICD-10-CM

## 2023-02-20 DIAGNOSIS — G47.9 SLEEP DISORDER: ICD-10-CM

## 2023-02-20 DIAGNOSIS — J30.1 SEASONAL ALLERGIC RHINITIS DUE TO POLLEN: ICD-10-CM

## 2023-02-20 RX ORDER — MOMETASONE FUROATE 50 UG/1
2 SPRAY, METERED NASAL DAILY
Qty: 17 G | Refills: 3 | Status: SHIPPED | OUTPATIENT
Start: 2023-02-20

## 2023-02-20 RX ORDER — CELECOXIB 200 MG/1
200 CAPSULE ORAL DAILY
Qty: 30 CAPSULE | Refills: 2 | Status: SHIPPED | OUTPATIENT
Start: 2023-02-20

## 2023-02-20 RX ORDER — TRAMADOL HYDROCHLORIDE 50 MG/1
50 TABLET ORAL EVERY 8 HOURS PRN
Qty: 30 TABLET | Refills: 0 | Status: SHIPPED | OUTPATIENT
Start: 2023-02-20

## 2023-02-20 RX ORDER — ZOLPIDEM TARTRATE 10 MG/1
10 TABLET ORAL
Qty: 30 TABLET | Refills: 0 | Status: SHIPPED | OUTPATIENT
Start: 2023-02-20

## 2023-02-27 ENCOUNTER — TELEPHONE (OUTPATIENT)
Dept: FAMILY MEDICINE CLINIC | Facility: CLINIC | Age: 73
End: 2023-02-27

## 2023-03-22 ENCOUNTER — TELEPHONE (OUTPATIENT)
Dept: FAMILY MEDICINE CLINIC | Facility: CLINIC | Age: 73
End: 2023-03-22

## 2023-03-22 NOTE — TELEPHONE ENCOUNTER
Mahsa Villavicencio states he dropped off paperwork for you to fill out about 2 wks ago  He states he also attached rx requests  Please call when ready    thanks

## 2023-03-30 DIAGNOSIS — T84.498S FAILED ORTHOPEDIC IMPLANT, SEQUELA: ICD-10-CM

## 2023-03-30 RX ORDER — TRAMADOL HYDROCHLORIDE 50 MG/1
50 TABLET ORAL EVERY 8 HOURS PRN
Qty: 30 TABLET | Refills: 0 | Status: SHIPPED | OUTPATIENT
Start: 2023-03-30

## 2023-04-03 DIAGNOSIS — R21 RASH: ICD-10-CM

## 2023-04-03 DIAGNOSIS — K21.9 LPRD (LARYNGOPHARYNGEAL REFLUX DISEASE): Primary | ICD-10-CM

## 2023-04-03 RX ORDER — CLOTRIMAZOLE AND BETAMETHASONE DIPROPIONATE 10; .5 MG/ML; MG/ML
LOTION TOPICAL 2 TIMES DAILY
Qty: 30 ML | Refills: 3 | Status: SHIPPED | OUTPATIENT
Start: 2023-04-03

## 2023-04-03 RX ORDER — ALBUTEROL SULFATE 90 UG/1
2 AEROSOL, METERED RESPIRATORY (INHALATION) EVERY 4 HOURS PRN
Qty: 18 G | Refills: 3 | Status: SHIPPED | OUTPATIENT
Start: 2023-04-03

## 2023-04-03 RX ORDER — HYDROXYZINE HYDROCHLORIDE 10 MG/1
10 TABLET, FILM COATED ORAL DAILY PRN
Qty: 30 TABLET | Refills: 3 | Status: SHIPPED | OUTPATIENT
Start: 2023-04-03

## 2023-05-02 DIAGNOSIS — T84.498S FAILED ORTHOPEDIC IMPLANT, SEQUELA: ICD-10-CM

## 2023-05-03 RX ORDER — TRAMADOL HYDROCHLORIDE 50 MG/1
50 TABLET ORAL EVERY 8 HOURS PRN
Qty: 30 TABLET | Refills: 0 | Status: SHIPPED | OUTPATIENT
Start: 2023-05-03

## 2023-05-03 RX ORDER — AMLODIPINE BESYLATE 5 MG/1
5 TABLET ORAL DAILY
Qty: 90 TABLET | Refills: 0 | Status: SHIPPED | OUTPATIENT
Start: 2023-05-03

## 2023-06-02 DIAGNOSIS — T84.498S FAILED ORTHOPEDIC IMPLANT, SEQUELA: ICD-10-CM

## 2023-06-02 RX ORDER — TRAMADOL HYDROCHLORIDE 50 MG/1
50 TABLET ORAL EVERY 8 HOURS PRN
Qty: 30 TABLET | Refills: 0 | Status: SHIPPED | OUTPATIENT
Start: 2023-06-02

## 2023-06-08 ENCOUNTER — OFFICE VISIT (OUTPATIENT)
Dept: URGENT CARE | Facility: CLINIC | Age: 73
End: 2023-06-08

## 2023-06-08 VITALS
RESPIRATION RATE: 20 BRPM | DIASTOLIC BLOOD PRESSURE: 80 MMHG | SYSTOLIC BLOOD PRESSURE: 140 MMHG | OXYGEN SATURATION: 100 % | BODY MASS INDEX: 24.94 KG/M2 | TEMPERATURE: 98.5 F | HEIGHT: 66 IN | WEIGHT: 155.2 LBS | HEART RATE: 89 BPM

## 2023-06-08 DIAGNOSIS — H69.93 DISORDER OF BOTH EUSTACHIAN TUBES: Primary | ICD-10-CM

## 2023-06-08 RX ORDER — FLUTICASONE PROPIONATE 50 MCG
1 SPRAY, SUSPENSION (ML) NASAL DAILY
Qty: 1 G | Refills: 0 | Status: SHIPPED | OUTPATIENT
Start: 2023-06-08

## 2023-06-08 NOTE — PATIENT INSTRUCTIONS
Eustachian Tube Dysfunction   AMBULATORY CARE:   Eustachian tube dysfunction (ETD)  is a condition that prevents your eustachian tubes from opening properly  It can also cause them to become blocked  Eustachian tubes connect your middle ear to the back of your nose and throat  These tubes open and allow air to flow in and out when you sneeze, swallow, or yawn  Common signs and symptoms include the following:   Fullness or pressure in your ears    Muffled hearing, or a feeling you are hearing under water or have clogged ears    Pain in one or both ears    Ringing in your ears    Popping, crackling, or clicking feeling in your ears    Trouble keeping your balance    Call your doctor or otolaryngologist if:   Your symptoms do not improve or get worse  You have a fever  You have any hearing loss  You have questions or concerns about your condition or care  Treatment:  ETD may get better on its own without any treatment  If it continues, you may need any of the following:  Swallow, yawn, or chew gum  to help open your eustachian tubes  Your healthcare provider may also recommend you blow with your mouth shut and your nostrils pinched closed  Air pressure devices  push air into your nose and eustachian tubes to help relieve air pressure in your ear  Treatment for allergies  such as decongestants, antihistamines, and nasal steroids may improve ETD  They may help decrease swelling of the eustachian tubes  A myringotomy  is surgery to make a hole in your eardrum  The hole relieves pressure and lets fluid drain from your ear  A pressure equalizing (PE) tube may be used to keep the hole open and to help drain fluid  Tuboplasty  is a procedure to widen your eustachian tubes  Follow up with your doctor or otolaryngologist as directed:  Write down your questions so you remember to ask them during your visits    © Copyright [employer] 2022 Information is for End User's use only and may not be sold, redistributed or otherwise used for commercial purposes  The above information is an  only  It is not intended as medical advice for individual conditions or treatments  Talk to your doctor, nurse or pharmacist before following any medical regimen to see if it is safe and effective for you  Eustachian tube dysfunction:   -No sign of infection on exam    -Air pressure device may relieve your sx  -Fluticasone Nasal Spray for PND and congestion  -You can use OTC zyrtec or claritin    -Use a humidifier next to your bed  Take steam showers     -You can take Advil or Tylenol for pain  -Stay very well hydrated and rest   -If your symptoms persist or worsen follow up immediately with your PCP

## 2023-06-08 NOTE — PROGRESS NOTES
[facility] [employer] [facility]        NAME: Philomena Virk is a 74 y o  male  : 1950    MRN: [id number]  DATE: 2023  TIME: 10:31 AM    Assessment and Plan   Disorder of both eustachian tubes [H69 [id number]]  1  Disorder of both eustachian tubes  fluticasone (FLONASE) 50 mcg/act nasal spray            Patient Instructions   Eustachian tube dysfunction:   -No sign of infection on exam    -Air pressure device may relieve your sx  -Fluticasone Nasal Spray for PND and congestion  -You can use OTC zyrtec or claritin    -Use a humidifier next to your bed  Take steam showers  -You can take Advil or Tylenol for pain  -Stay very well hydrated and rest   -If your symptoms persist or worsen follow up immediately with your PCP       Follow up with PCP in 3-5 days  Proceed to  ER if symptoms worsen  Chief Complaint     Chief Complaint   Patient presents with   • Earache     Pt here for bilateral  ear pain  x4 days  pt states  itchy now pressure, some stabbing pain, nasal congestion,  no  fever  Pt used Sudafed  History of Present Illness       The patient is a 73-year-old male who presents today for bilateral ear pain and pressure x 4 days  He has been taking sudafed with mild relief  Last night he had sharp/stabbing pain over the L ear  He also notes nasal congestion and rhinorrhea  This morning he had itching of the L ear  No fever, chills or body aches  No dizziness or weakness  No headache  Review of Systems   Review of Systems   Constitutional: Negative for activity change, appetite change, chills, diaphoresis, fatigue and fever  HENT: Positive for congestion, ear pain, postnasal drip and rhinorrhea  Negative for ear discharge, facial swelling, hearing loss, sinus pressure, sinus pain, sore throat, tinnitus, trouble swallowing and voice change  Eyes: Negative for visual disturbance  Respiratory: Negative for apnea, cough, chest tightness, shortness of breath, wheezing and stridor  Cardiovascular: Negative for chest pain, palpitations and leg swelling  Gastrointestinal: Negative for abdominal distention and abdominal pain  Genitourinary: Negative for decreased urine volume  Musculoskeletal: Negative for arthralgias, joint swelling, myalgias, neck pain and neck stiffness  Skin: Negative for rash  Allergic/Immunologic: Negative for immunocompromised state  Neurological: Negative for dizziness, weakness, light-headedness, numbness and headaches  Hematological: Negative for adenopathy           Current Medications       Current Outpatient Medications:   •  albuterol (PROVENTIL HFA,VENTOLIN HFA) 90 mcg/act inhaler, Inhale 2 puffs every 4 (four) hours as needed for wheezing every 4 - 6 hours as needed, Disp: 18 g, Rfl: 3  •  amLODIPine (NORVASC) 5 mg tablet, Take 1 tablet (5 mg total) by mouth daily, Disp: 90 tablet, Rfl: 0  •  atorvastatin (LIPITOR) 20 mg tablet, TAKE 1 TABLET(20 MG) BY MOUTH DAILY, Disp: 90 tablet, Rfl: 3  •  celecoxib (CeleBREX) 200 mg capsule, Take 1 capsule (200 mg total) by mouth daily, Disp: 30 capsule, Rfl: 2  •  Cholecalciferol (VITAMIN D3 PO), Take 2,000 Units by mouth, Disp: , Rfl:   •  clotrimazole-betamethasone (LOTRISONE) 1-0 05 % lotion, Apply topically 2 (two) times a day, Disp: 30 mL, Rfl: 3  •  finasteride (PROSCAR) 5 mg tablet, TAKE 1 TABLET(5 MG) BY MOUTH DAILY, Disp: 90 tablet, Rfl: 3  •  fluticasone (FLONASE) 50 mcg/act nasal spray, 1 spray into each nostril daily, Disp: 1 g, Rfl: 0  •  hydrOXYzine HCL (ATARAX) 10 mg tablet, Take 1 tablet (10 mg total) by mouth daily as needed for itching, Disp: 30 tablet, Rfl: 3  •  IRON, FERROUS SULFATE, PO, Take by mouth every other day , Disp: , Rfl:   •  mometasone (NASONEX) 50 mcg/act nasal spray, 2 sprays into each nostril daily, Disp: 17 g, Rfl: 3  •  Naproxen Sodium 220 MG CAPS, Take by mouth as needed , Disp: , Rfl:   •  Pseudoephedrine HCl (SUDAFED PO), Take by mouth as needed, Disp: , Rfl:   • "traMADol (ULTRAM) 50 mg tablet, Take 1 tablet (50 mg total) by mouth every 8 (eight) hours as needed for moderate pain, Disp: 30 tablet, Rfl: 0  •  vitamin B-12 (CYANOCOBALAMIN) 100 MCG TABS, Take 2,000 mcg by mouth once a week  , Disp: , Rfl:   •  zolpidem (AMBIEN) 10 mg tablet, Take 1 tablet (10 mg total) by mouth daily at bedtime as needed for sleep 1/2 to 1 PRN, Disp: 30 tablet, Rfl: 0    Current Allergies     Allergies as of 06/08/2023 - Reviewed 06/08/2023   Allergen Reaction Noted   • Meloxicam Other (See Comments) 02/07/2019   • Medical tape Itching 12/14/2017            The following portions of the patient's history were reviewed and updated as appropriate: allergies, current medications, past family history, past medical history, past social history, past surgical history and problem list      Past Medical History:   Diagnosis Date   • Arthritis    • Bilateral chronic knee pain    • Hyperlipidemia    • Sleep apnea        Past Surgical History:   Procedure Laterality Date   • HIP SURGERY Left     NISHA   • HIP SURGERY Right     revision   • KIDNEY STONE SURGERY     • KIDNEY STONE SURGERY     • KNEE SURGERY Right    • KNEE SURGERY Right     ligament   • KNEE SURGERY Right     ligament   • NASAL SINUS SURGERY     • TONSILECTOMY AND ADNOIDECTOMY     • TOTAL HIP ARTHROPLASTY Right        Family History   Problem Relation Age of Onset   • Cancer Mother    • Arthritis Mother    • Prostate cancer Father    • Hyperlipidemia Father    • Hypertension Father    • Prostate cancer Paternal Uncle    • Mental illness Neg Hx    • Substance Abuse Neg Hx          Medications have been verified  Objective   /80   Pulse 89   Temp 98 5 °F (36 9 °C)   Resp 20   Ht 5' 6\" (1 676 m)   Wt 70 4 kg (155 lb 3 2 oz)   SpO2 100%   BMI 25 05 kg/m²   No LMP for male patient  Physical Exam     Physical Exam  Vitals and nursing note reviewed  Constitutional:       General: He is not in acute distress       " Appearance: He is well-developed  He is not ill-appearing, toxic-appearing or diaphoretic  HENT:      Head: Normocephalic and atraumatic  Right Ear: Hearing, ear canal and external ear normal  No decreased hearing noted  No drainage, swelling or tenderness  A middle ear effusion is present  There is no impacted cerumen  No foreign body  No mastoid tenderness  No hemotympanum  Tympanic membrane is not injected, perforated, erythematous, retracted or bulging  Left Ear: Hearing, ear canal and external ear normal  No decreased hearing noted  No drainage, swelling or tenderness  A middle ear effusion is present  There is no impacted cerumen  No foreign body  No mastoid tenderness  No hemotympanum  Tympanic membrane is not injected, perforated, erythematous, retracted or bulging  Nose: Nose normal  No mucosal edema or rhinorrhea  Right Sinus: No maxillary sinus tenderness or frontal sinus tenderness  Left Sinus: No maxillary sinus tenderness or frontal sinus tenderness  Mouth/Throat:      Lips: Pink  Mouth: Mucous membranes are moist       Pharynx: Oropharynx is clear  Uvula midline  No pharyngeal swelling, oropharyngeal exudate, posterior oropharyngeal erythema or uvula swelling  Tonsils: No tonsillar exudate or tonsillar abscesses  1+ on the right  1+ on the left  Cardiovascular:      Rate and Rhythm: Normal rate and regular rhythm  Heart sounds: S1 normal and S2 normal  Heart sounds not distant  No murmur heard  No friction rub  No gallop  No S3 or S4 sounds  Pulmonary:      Effort: No tachypnea, bradypnea, accessory muscle usage or respiratory distress  Breath sounds: No decreased breath sounds, wheezing, rhonchi or rales  Musculoskeletal:      Cervical back: Normal range of motion and neck supple  Lymphadenopathy:      Cervical: No cervical adenopathy  Neurological:      Mental Status: He is alert and oriented to person, place, and time     Psychiatric: Behavior: Behavior normal

## 2023-06-29 DIAGNOSIS — R03.0 ELEVATED BLOOD-PRESSURE READING WITHOUT DIAGNOSIS OF HYPERTENSION: ICD-10-CM

## 2023-06-29 DIAGNOSIS — E78.2 MIXED HYPERLIPIDEMIA: ICD-10-CM

## 2023-06-29 DIAGNOSIS — K21.9 LPRD (LARYNGOPHARYNGEAL REFLUX DISEASE): Primary | ICD-10-CM

## 2023-07-05 DIAGNOSIS — T84.498S FAILED ORTHOPEDIC IMPLANT, SEQUELA: ICD-10-CM

## 2023-07-05 RX ORDER — TRAMADOL HYDROCHLORIDE 50 MG/1
50 TABLET ORAL EVERY 8 HOURS PRN
Qty: 30 TABLET | Refills: 0 | Status: SHIPPED | OUTPATIENT
Start: 2023-07-05

## 2023-07-11 DIAGNOSIS — G47.9 SLEEP DISORDER: ICD-10-CM

## 2023-07-11 RX ORDER — ZOLPIDEM TARTRATE 10 MG/1
10 TABLET ORAL
Qty: 30 TABLET | Refills: 0 | Status: SHIPPED | OUTPATIENT
Start: 2023-07-11

## 2023-07-14 ENCOUNTER — TELEPHONE (OUTPATIENT)
Dept: FAMILY MEDICINE CLINIC | Facility: CLINIC | Age: 73
End: 2023-07-14

## 2023-07-14 DIAGNOSIS — E78.2 MIXED HYPERLIPIDEMIA: ICD-10-CM

## 2023-07-14 DIAGNOSIS — K21.9 LPRD (LARYNGOPHARYNGEAL REFLUX DISEASE): Primary | ICD-10-CM

## 2023-07-14 DIAGNOSIS — R03.0 ELEVATED BLOOD-PRESSURE READING WITHOUT DIAGNOSIS OF HYPERTENSION: ICD-10-CM

## 2023-07-14 NOTE — TELEPHONE ENCOUNTER
He received his bw orders, but they are made to [employer].      He states that he uses [employer] pays it.     Can you place new orders to lab ean

## 2023-07-15 LAB
ALBUMIN SERPL-MCNC: 4.6 G/DL (ref 3.8–4.8)
ALBUMIN/GLOB SERPL: 2.6 {RATIO} (ref 1.2–2.2)
ALP SERPL-CCNC: 89 IU/L (ref 44–121)
ALT SERPL-CCNC: 13 IU/L (ref 0–44)
AST SERPL-CCNC: 19 IU/L (ref 0–40)
BASOPHILS # BLD AUTO: 0.1 X10E3/UL (ref 0–0.2)
BASOPHILS NFR BLD AUTO: 2 %
BILIRUB SERPL-MCNC: 0.6 MG/DL (ref 0–1.2)
BUN SERPL-MCNC: 15 MG/DL (ref 8–27)
BUN/CREAT SERPL: 16 (ref 10–24)
CALCIUM SERPL-MCNC: 9.9 MG/DL (ref 8.6–10.2)
CHLORIDE SERPL-SCNC: 104 MMOL/L (ref 96–106)
CHOLEST SERPL-MCNC: 162 MG/DL (ref 100–199)
CHOLEST/HDLC SERPL: 2.1 RATIO (ref 0–5)
CO2 SERPL-SCNC: 25 MMOL/L (ref 20–29)
CREAT SERPL-MCNC: 0.96 MG/DL (ref 0.76–1.27)
EGFR: 82 ML/MIN/1.73
EOSINOPHIL # BLD AUTO: 0.5 X10E3/UL (ref 0–0.4)
EOSINOPHIL NFR BLD AUTO: 9 %
ERYTHROCYTE [DISTWIDTH] IN BLOOD BY AUTOMATED COUNT: 13.5 % (ref 11.6–15.4)
GLOBULIN SER-MCNC: 1.8 G/DL (ref 1.5–4.5)
GLUCOSE SERPL-MCNC: 85 MG/DL (ref 70–99)
HCT VFR BLD AUTO: 43.7 % (ref 37.5–51)
HDLC SERPL-MCNC: 79 MG/DL
HGB BLD-MCNC: 15.2 G/DL (ref 13–17.7)
IMM GRANULOCYTES # BLD: 0 X10E3/UL (ref 0–0.1)
IMM GRANULOCYTES NFR BLD: 0 %
LDLC SERPL CALC-MCNC: 73 MG/DL (ref 0–99)
LYMPHOCYTES # BLD AUTO: 1.3 X10E3/UL (ref 0.7–3.1)
LYMPHOCYTES NFR BLD AUTO: 25 %
MCH RBC QN AUTO: 30.1 PG (ref 26.6–33)
MCHC RBC AUTO-ENTMCNC: 34.8 G/DL (ref 31.5–35.7)
MCV RBC AUTO: 87 FL (ref 79–97)
MONOCYTES # BLD AUTO: 0.5 X10E3/UL (ref 0.1–0.9)
MONOCYTES NFR BLD AUTO: 9 %
NEUTROPHILS # BLD AUTO: 2.8 X10E3/UL (ref 1.4–7)
NEUTROPHILS NFR BLD AUTO: 55 %
PLATELET # BLD AUTO: 170 X10E3/UL (ref 150–450)
POTASSIUM SERPL-SCNC: 4.6 MMOL/L (ref 3.5–5.2)
PROT SERPL-MCNC: 6.4 G/DL (ref 6–8.5)
RBC # BLD AUTO: 5.05 X10E6/UL (ref 4.14–5.8)
SL AMB VLDL CHOLESTEROL CALC: 10 MG/DL (ref 5–40)
SODIUM SERPL-SCNC: 142 MMOL/L (ref 134–144)
TRIGL SERPL-MCNC: 48 MG/DL (ref 0–149)
WBC # BLD AUTO: 5.1 X10E3/UL (ref 3.4–10.8)

## 2023-08-11 DIAGNOSIS — T84.498S FAILED ORTHOPEDIC IMPLANT, SEQUELA: ICD-10-CM

## 2023-08-11 NOTE — TELEPHONE ENCOUNTER
Requested medication(s) are due for refill today: Yes - appt due and scheduled for 8/15/23  Patient has already received a courtesy refill: No  Other reason request has been forwarded to provider:  This refill cannot be delegated

## 2023-08-12 RX ORDER — TRAMADOL HYDROCHLORIDE 50 MG/1
50 TABLET ORAL EVERY 8 HOURS PRN
Qty: 30 TABLET | Refills: 0 | Status: SHIPPED | OUTPATIENT
Start: 2023-08-12

## 2023-08-15 ENCOUNTER — OFFICE VISIT (OUTPATIENT)
Dept: FAMILY MEDICINE CLINIC | Facility: CLINIC | Age: 73
End: 2023-08-15
Payer: COMMERCIAL

## 2023-08-15 VITALS
RESPIRATION RATE: 12 BRPM | WEIGHT: 157 LBS | HEIGHT: 66 IN | OXYGEN SATURATION: 98 % | HEART RATE: 74 BPM | BODY MASS INDEX: 25.23 KG/M2 | TEMPERATURE: 95.8 F | SYSTOLIC BLOOD PRESSURE: 122 MMHG | DIASTOLIC BLOOD PRESSURE: 82 MMHG

## 2023-08-15 DIAGNOSIS — F11.20 CONTINUOUS OPIOID DEPENDENCE (HCC): ICD-10-CM

## 2023-08-15 DIAGNOSIS — F34.1 DYSTHYMIA: ICD-10-CM

## 2023-08-15 DIAGNOSIS — M15.9 PRIMARY OSTEOARTHRITIS INVOLVING MULTIPLE JOINTS: ICD-10-CM

## 2023-08-15 DIAGNOSIS — R03.0 ELEVATED BLOOD-PRESSURE READING WITHOUT DIAGNOSIS OF HYPERTENSION: Primary | ICD-10-CM

## 2023-08-15 DIAGNOSIS — E78.2 MIXED HYPERLIPIDEMIA: ICD-10-CM

## 2023-08-15 PROCEDURE — 99214 OFFICE O/P EST MOD 30 MIN: CPT | Performed by: FAMILY MEDICINE

## 2023-08-15 NOTE — PROGRESS NOTES
Name: Yossi Raphael      : 1950      MRN: [id number]  Encounter Provider: Gabo Lopez MD  Encounter Date: 8/15/2023   Encounter department: [facility]     1. Elevated blood-pressure reading without diagnosis of hypertension  Assessment & Plan:  STABLE  DENIES ANY CP, SOB, PALPITATIONS, OR HEADACHE  NOTES NO WATER RETENTION  COMPLIANT WITH MEDICATION  NO CONCERNS    - CONTINUE CURRENT TREATMENT PLAN  - MONITOR DIETARY SODIUM INTAKE  - ENCOURAGE PHYSICAL ACTIVITY  - RV 3 MONTHS        2. Mixed hyperlipidemia    3. Primary osteoarthritis involving multiple joints  Assessment & Plan:  STABLE  DENIES ANY JOINT SWELLING OR REDNESS  JOINT STIFFNESS PRESENT  PAIN MANAGEMENT ADEQUATE    - CONTINUE CURRENT MANAGEMENT  - MEDICATION AS DIRECTED  - CALL / RETURN IF SYMPTOMS WORSEN        4. Dysthymia    5. Continuous opioid dependence ([address])           Subjective      PATIENT RETURNS FOR ROUTINE EVALUATION OF PATIENT'S MEDICAL ISSUES    INDIVIDUAL MEDICAL ISSUES WITH THEIR CURRENT STATUS, ASSESSMENT AND PLANS ARE LISTED ABOVE        Review of Systems   Constitutional: Negative for chills, fatigue and fever. HENT: Negative for congestion, ear discharge, ear pain, mouth sores, postnasal drip, sore throat and trouble swallowing. Eyes: Negative for pain, discharge and visual disturbance. Respiratory: Negative for cough, shortness of breath and wheezing. Cardiovascular: Negative for chest pain, palpitations and leg swelling. Gastrointestinal: Negative for abdominal distention, abdominal pain, blood in stool, diarrhea and nausea. Endocrine: Negative for polydipsia, polyphagia and polyuria. Genitourinary: Negative for dysuria, frequency, hematuria and urgency. Musculoskeletal: Negative for arthralgias, gait problem and joint swelling. Skin: Negative for pallor and rash.    Neurological: Negative for dizziness, syncope, speech difficulty, weakness, light-headedness, numbness and headaches. Hematological: Negative for adenopathy. Psychiatric/Behavioral: Negative for behavioral problems, confusion and sleep disturbance. The patient is not nervous/anxious.         Current Outpatient Medications on File Prior to Visit   Medication Sig   • albuterol (PROVENTIL HFA,VENTOLIN HFA) 90 mcg/act inhaler Inhale 2 puffs every 4 (four) hours as needed for wheezing every 4 - 6 hours as needed   • amLODIPine (NORVASC) 5 mg tablet Take 1 tablet (5 mg total) by mouth daily   • atorvastatin (LIPITOR) 20 mg tablet TAKE 1 TABLET(20 MG) BY MOUTH DAILY   • celecoxib (CeleBREX) 200 mg capsule Take 1 capsule (200 mg total) by mouth daily   • Cholecalciferol (VITAMIN D3 PO) Take 2,000 Units by mouth   • clotrimazole-betamethasone (LOTRISONE) 1-0.05 % lotion Apply topically 2 (two) times a day   • finasteride (PROSCAR) 5 mg tablet TAKE 1 TABLET(5 MG) BY MOUTH DAILY   • fluticasone (FLONASE) 50 mcg/act nasal spray 1 spray into each nostril daily   • hydrOXYzine HCL (ATARAX) 10 mg tablet Take 1 tablet (10 mg total) by mouth daily as needed for itching   • IRON, FERROUS SULFATE, PO Take by mouth every other day    • mometasone (NASONEX) 50 mcg/act nasal spray 2 sprays into each nostril daily   • Naproxen Sodium 220 MG CAPS Take by mouth as needed    • Pseudoephedrine HCl (SUDAFED PO) Take by mouth as needed   • traMADol (ULTRAM) 50 mg tablet Take 1 tablet (50 mg total) by mouth every 8 (eight) hours as needed for moderate pain   • vitamin B-12 (CYANOCOBALAMIN) 100 MCG TABS Take 2,000 mcg by mouth once a week     • zolpidem (AMBIEN) 10 mg tablet Take 1 tablet (10 mg total) by mouth daily at bedtime as needed for sleep 1/2 to 1 PRN       Objective     /82 (BP Location: Left arm, Patient Position: Sitting, Cuff Size: Large)   Pulse 74   Temp (!) 95.8 °F (35.4 °C) (Temporal)   Resp 12   Ht 5' 6" (1.676 m)   Wt 71.2 kg (157 lb)   SpO2 98%   BMI 25.34 kg/m²     Physical Exam  Constitutional:       General: He is not in acute distress. Appearance: Normal appearance. He is well-developed and normal weight. He is not ill-appearing. HENT:      Head: Normocephalic and atraumatic. Eyes:      General:         Right eye: No discharge. Left eye: No discharge. Conjunctiva/sclera: Conjunctivae normal.      Pupils: Pupils are equal, round, and reactive to light. Neck:      Thyroid: No thyromegaly. Vascular: No JVD. Cardiovascular:      Rate and Rhythm: Normal rate and regular rhythm. Heart sounds: Normal heart sounds. No murmur heard. Pulmonary:      Effort: Pulmonary effort is normal.      Breath sounds: Normal breath sounds. No wheezing or rales. Abdominal:      General: Bowel sounds are normal.      Palpations: Abdomen is soft. There is no mass. Tenderness: There is no abdominal tenderness. There is no guarding or rebound. Musculoskeletal:         General: No tenderness or deformity. Cervical back: Neck supple. Comments: MODERATE DJD CHANGES     Lymphadenopathy:      Cervical: No cervical adenopathy. Skin:     General: Skin is warm and dry. Findings: No erythema or rash. Neurological:      General: No focal deficit present. Mental Status: He is alert and oriented to person, place, and time. Psychiatric:         Mood and Affect: Mood normal.         Behavior: Behavior normal.         Thought Content:  Thought content normal.         Judgment: Judgment normal.       Kezia Tejada MD

## 2023-08-15 NOTE — PATIENT INSTRUCTIONS
CONTINUE CURRENT TREATMENT PLAN  MONITOR DIETARY SODIUM, CHOL INTAKE  ENCOURAGE PHYSICAL ACTIVITY      RV 6 M, SOONER PRN    Recent Results (from the past 2688 hour(s))   CBC and differential    Collection Time: 07/14/23 12:49 PM   Result Value Ref Range    White Blood Cell Count 5.1 3.4 - 10.8 x10E3/uL    Red Blood Cell Count 5.05 4.14 - 5.80 x10E6/uL    Hemoglobin 15.2 13.0 - 17.7 g/dL    HCT 43.7 37.5 - 51.0 %    MCV 87 79 - 97 fL    MCH 30.1 26.6 - 33.0 pg    MCHC 34.8 31.5 - 35.7 g/dL    RDW 13.5 11.6 - 15.4 %    Platelet Count 016 712 - 450 x10E3/uL    Neutrophils 55 Not Estab. %    Lymphocytes 25 Not Estab. %    Monocytes 9 Not Estab. %    Eosinophils 9 Not Estab. %    Basophils PCT 2 Not Estab. %    Neutrophils (Absolute) 2.8 1.4 - 7.0 x10E3/uL    Lymphocytes (Absolute) 1.3 0.7 - 3.1 x10E3/uL    Monocytes (Absolute) 0.5 0.1 - 0.9 x10E3/uL    Eosinophils (Absolute) 0.5 (H) 0.0 - 0.4 x10E3/uL    Basophils ABS 0.1 0.0 - 0.2 x10E3/uL    Immature Granulocytes 0 Not Estab. %    Immature Granulocytes (Absolute) 0.0 0.0 - 0.1 x10E3/uL   Comprehensive metabolic panel    Collection Time: 07/14/23 12:49 PM   Result Value Ref Range    Glucose, Random 85 70 - 99 mg/dL    BUN 15 8 - 27 mg/dL    Creatinine 0.96 0.76 - 1.27 mg/dL    eGFR 82 >59 mL/min/1.73    SL AMB BUN/CREATININE RATIO 16 10 - 24    Sodium 142 134 - 144 mmol/L    Potassium 4.6 3.5 - 5.2 mmol/L    Chloride 104 96 - 106 mmol/L    CO2 25 20 - 29 mmol/L    CALCIUM 9.9 8.6 - 10.2 mg/dL    Protein, Total 6.4 6.0 - 8.5 g/dL    Albumin 4.6 3.8 - 4.8 g/dL    Globulin, Total 1.8 1.5 - 4.5 g/dL    Albumin/Globulin Ratio 2.6 (H) 1.2 - 2.2    TOTAL BILIRUBIN 0.6 0.0 - 1.2 mg/dL    Alk Phos Isoenzymes 89 44 - 121 IU/L    AST 19 0 - 40 IU/L    ALT 13 0 - 44 IU/L   Lipid panel    Collection Time: 07/14/23 12:49 PM   Result Value Ref Range    Cholesterol, Total 162 100 - 199 mg/dL    Triglycerides 48 0 - 149 mg/dL    HDL 79 >39 mg/dL    VLDL Cholesterol Calculated 10 5 - 40 mg/dL    LDL Calculated 73 0 - 99 mg/dL    T.  Chol/HDL Ratio 2.1 0.0 - 5.0 ratio

## 2023-08-15 NOTE — LETTER
CIERRA ROSENBERG      Current Outpatient Medications:     albuterol (PROVENTIL HFA,VENTOLIN HFA) 90 mcg/act inhaler, Inhale 2 puffs every 4 (four) hours as needed for wheezing every 4 - 6 hours as needed, Disp: 18 g, Rfl: 3    amLODIPine (NORVASC) 5 mg tablet, Take 1 tablet (5 mg total) by mouth daily, Disp: 90 tablet, Rfl: 0    atorvastatin (LIPITOR) 20 mg tablet, TAKE 1 TABLET(20 MG) BY MOUTH DAILY, Disp: 90 tablet, Rfl: 3    celecoxib (CeleBREX) 200 mg capsule, Take 1 capsule (200 mg total) by mouth daily, Disp: 30 capsule, Rfl: 2    Cholecalciferol (VITAMIN D3 PO), Take 2,000 Units by mouth, Disp: , Rfl:     clotrimazole-betamethasone (LOTRISONE) 1-0.05 % lotion, Apply topically 2 (two) times a day, Disp: 30 mL, Rfl: 3    finasteride (PROSCAR) 5 mg tablet, TAKE 1 TABLET(5 MG) BY MOUTH DAILY, Disp: 90 tablet, Rfl: 3    fluticasone (FLONASE) 50 mcg/act nasal spray, 1 spray into each nostril daily, Disp: 1 g, Rfl: 0    hydrOXYzine HCL (ATARAX) 10 mg tablet, Take 1 tablet (10 mg total) by mouth daily as needed for itching, Disp: 30 tablet, Rfl: 3    IRON, FERROUS SULFATE, PO, Take by mouth every other day , Disp: , Rfl:     mometasone (NASONEX) 50 mcg/act nasal spray, 2 sprays into each nostril daily, Disp: 17 g, Rfl: 3    Naproxen Sodium 220 MG CAPS, Take by mouth as needed , Disp: , Rfl:     Pseudoephedrine HCl (SUDAFED PO), Take by mouth as needed, Disp: , Rfl:     traMADol (ULTRAM) 50 mg tablet, Take 1 tablet (50 mg total) by mouth every 8 (eight) hours as needed for moderate pain, Disp: 30 tablet, Rfl: 0    vitamin B-12 (CYANOCOBALAMIN) 100 MCG TABS, Take 2,000 mcg by mouth once a week  , Disp: , Rfl:     zolpidem (AMBIEN) 10 mg tablet, Take 1 tablet (10 mg total) by mouth daily at bedtime as needed for sleep 1/2 to 1 PRN, Disp: 30 tablet, Rfl: 0      Recent Results (from the past 2688 hour(s))   CBC and differential    Collection Time: 07/14/23 12:49 PM   Result Value Ref Range    White Blood Cell Count 5.1 3.4 - 10.8 x10E3/uL    Red Blood Cell Count 5.05 4.14 - 5.80 x10E6/uL    Hemoglobin 15.2 13.0 - 17.7 g/dL    HCT 43.7 37.5 - 51.0 %    MCV 87 79 - 97 fL    MCH 30.1 26.6 - 33.0 pg    MCHC 34.8 31.5 - 35.7 g/dL    RDW 13.5 11.6 - 15.4 %    Platelet Count 186 933 - 450 x10E3/uL    Neutrophils 55 Not Estab. %    Lymphocytes 25 Not Estab. %    Monocytes 9 Not Estab. %    Eosinophils 9 Not Estab. %    Basophils PCT 2 Not Estab. %    Neutrophils (Absolute) 2.8 1.4 - 7.0 x10E3/uL    Lymphocytes (Absolute) 1.3 0.7 - 3.1 x10E3/uL    Monocytes (Absolute) 0.5 0.1 - 0.9 x10E3/uL    Eosinophils (Absolute) 0.5 (H) 0.0 - 0.4 x10E3/uL    Basophils ABS 0.1 0.0 - 0.2 x10E3/uL    Immature Granulocytes 0 Not Estab. %    Immature Granulocytes (Absolute) 0.0 0.0 - 0.1 x10E3/uL   Comprehensive metabolic panel    Collection Time: 07/14/23 12:49 PM   Result Value Ref Range    Glucose, Random 85 70 - 99 mg/dL    BUN 15 8 - 27 mg/dL    Creatinine 0.96 0.76 - 1.27 mg/dL    eGFR 82 >59 mL/min/1.73    SL AMB BUN/CREATININE RATIO 16 10 - 24    Sodium 142 134 - 144 mmol/L    Potassium 4.6 3.5 - 5.2 mmol/L    Chloride 104 96 - 106 mmol/L    CO2 25 20 - 29 mmol/L    CALCIUM 9.9 8.6 - 10.2 mg/dL    Protein, Total 6.4 6.0 - 8.5 g/dL    Albumin 4.6 3.8 - 4.8 g/dL    Globulin, Total 1.8 1.5 - 4.5 g/dL    Albumin/Globulin Ratio 2.6 (H) 1.2 - 2.2    TOTAL BILIRUBIN 0.6 0.0 - 1.2 mg/dL    Alk Phos Isoenzymes 89 44 - 121 IU/L    AST 19 0 - 40 IU/L    ALT 13 0 - 44 IU/L   Lipid panel    Collection Time: 07/14/23 12:49 PM   Result Value Ref Range    Cholesterol, Total 162 100 - 199 mg/dL    Triglycerides 48 0 - 149 mg/dL    HDL 79 >39 mg/dL    VLDL Cholesterol Calculated 10 5 - 40 mg/dL    LDL Calculated 73 0 - 99 mg/dL    T.  Chol/HDL Ratio 2.1 0.0 - 5.0 ratio

## 2023-08-28 DIAGNOSIS — T84.498S FAILED ORTHOPEDIC IMPLANT, SEQUELA: ICD-10-CM

## 2023-08-28 RX ORDER — AMLODIPINE BESYLATE 5 MG/1
5 TABLET ORAL DAILY
Qty: 90 TABLET | Refills: 0 | Status: SHIPPED | OUTPATIENT
Start: 2023-08-28

## 2023-09-14 DIAGNOSIS — G47.9 SLEEP DISORDER: ICD-10-CM

## 2023-09-14 DIAGNOSIS — T84.498S FAILED ORTHOPEDIC IMPLANT, SEQUELA: ICD-10-CM

## 2023-09-14 NOTE — TELEPHONE ENCOUNTER
Reason for call:   [x] Refill   [] Prior Auth  [] Other:     Office:   [x] PCP/Provider - Kaylen Rosado  [] Speciality/Provider -     Medication: Tramadol 50 mg 1 q8 prn, Zolpidem 10mg 1 hs prn       Quantity: 30    Pharmacy: [address]     Does the patient have enough for 3 days?    [x] Yes   [] No - Send as HP to POD    I no

## 2023-09-16 RX ORDER — TRAMADOL HYDROCHLORIDE 50 MG/1
50 TABLET ORAL EVERY 8 HOURS PRN
Qty: 30 TABLET | Refills: 0 | Status: SHIPPED | OUTPATIENT
Start: 2023-09-16

## 2023-09-16 RX ORDER — ZOLPIDEM TARTRATE 10 MG/1
10 TABLET ORAL
Qty: 30 TABLET | Refills: 0 | Status: SHIPPED | OUTPATIENT
Start: 2023-09-16

## 2023-10-17 DIAGNOSIS — T84.498S FAILED ORTHOPEDIC IMPLANT, SEQUELA: ICD-10-CM

## 2023-10-17 RX ORDER — TRAMADOL HYDROCHLORIDE 50 MG/1
50 TABLET ORAL EVERY 8 HOURS PRN
Qty: 30 TABLET | Refills: 0 | Status: SHIPPED | OUTPATIENT
Start: 2023-10-17

## 2023-10-17 NOTE — TELEPHONE ENCOUNTER
Reason for call:   [x] Refill   [] Prior Auth  [] Other:     Office:   [x] PCP/Provider - [address] / Franko Flores  [] Specialty/Provider -     Medication: tramadol    Dose/Frequency: 50mg q8 prn    Quantity: 30    Pharmacy: [place] [id number]     Does the patient have enough for 3 days?    [] Yes   [x] No - Send as HP to POD

## 2023-10-26 DIAGNOSIS — G47.9 SLEEP DISORDER: ICD-10-CM

## 2023-10-26 NOTE — TELEPHONE ENCOUNTER
Reason for call:   [x] Refill   [] Prior Auth  [] Other:     Office:   [x] PCP/Provider - [address] [facility] / Kristina Macias  [] Specialty/Provider -     Medication: zolpidem    Dose/Frequency: 10mg qd hs prn    Quantity: 30    Pharmacy: [place] [id number]     Does the patient have enough for 3 days?    [] Yes   [x] No - Send as HP to POD

## 2023-10-29 RX ORDER — ZOLPIDEM TARTRATE 10 MG/1
10 TABLET ORAL
Qty: 30 TABLET | Refills: 0 | Status: SHIPPED | OUTPATIENT
Start: 2023-10-29

## 2023-10-30 ENCOUNTER — TELEPHONE (OUTPATIENT)
Age: 73
End: 2023-10-30

## 2023-10-30 NOTE — TELEPHONE ENCOUNTER
I called Mason Will back and informed him Dr Nola Dolan is out of the office this week. He states he is enrolled in Medicare as of 11/1 since losing the insurance through his company. Mason Will asked if Dr Nola Dolan would continue to fill his medications without a drug plan. I advised Mason Will, Dr Nola Dolan could still refill his meds, but he should contact his pharmacy to get a better idea of out of pocket expenses. Mason Will agreed and said his questions were answered and no longer needs to speak to Dr Nola Dolan.   No further action required

## 2023-10-30 NOTE — TELEPHONE ENCOUNTER
Patient is requesting a call back from the doctor to discuss a life changing event that happened to him. He was forced to retire from work and wants to discuss his enrollment with Medicare and his medications.  He is looking for guidance and would like an urgent call back today

## 2023-11-10 ENCOUNTER — TELEPHONE (OUTPATIENT)
Age: 73
End: 2023-11-10

## 2023-11-10 NOTE — TELEPHONE ENCOUNTER
Patient would like recommendation for OTC laxative. Please call back patient as he requested a call back from the office.   Jennifer Puri

## 2023-11-17 DIAGNOSIS — T84.498S FAILED ORTHOPEDIC IMPLANT, SEQUELA: ICD-10-CM

## 2023-11-17 NOTE — TELEPHONE ENCOUNTER
Reason for call:   [x] Refill   [] Prior Auth  [] Other:     Office:   [x] PCP/Provider -  Eliajh Hernandez  [] Specialty/Provider -     Medication: Tramadol     Dose/Frequency: 50 mg tablet    Quantity: 30 tablets    Pharmacy: [facility]    Does the patient have enough for 3 days?    [] Yes   [x] No - Send as HP to POD

## 2023-11-18 RX ORDER — TRAMADOL HYDROCHLORIDE 50 MG/1
50 TABLET ORAL EVERY 8 HOURS PRN
Qty: 30 TABLET | Refills: 0 | Status: SHIPPED | OUTPATIENT
Start: 2023-11-18

## 2023-11-20 ENCOUNTER — TELEPHONE (OUTPATIENT)
Dept: FAMILY MEDICINE CLINIC | Facility: CLINIC | Age: 73
End: 2023-11-20

## 2023-11-20 NOTE — TELEPHONE ENCOUNTER
Alex Muro came in the office to leave a message for Dr. Hetal Awan regarding his daughter    I believe Alex Muro made a comment to me that he was leaving [facility] But did not continue that conversation due to the reason he was originally here for. I left a message for him to return my call. Informed him that he will get the access center and they can see the reason I called. I wanted to confirm with him if he was still using Dr. Hetal Awan as his PCP.

## 2023-11-21 NOTE — TELEPHONE ENCOUNTER
Pt called to see if tramadol order had been sent to pharmacy. Chart shows confirmed receipt.     E-Prescribing Status: Receipt confirmed by pharmacy (11/18/2023  8:01 PM EST)     Pt will follow up w pharmacy

## 2023-12-16 DIAGNOSIS — T84.498S FAILED ORTHOPEDIC IMPLANT, SEQUELA: ICD-10-CM

## 2023-12-18 DIAGNOSIS — T84.498S FAILED ORTHOPEDIC IMPLANT, SEQUELA: ICD-10-CM

## 2023-12-18 RX ORDER — AMLODIPINE BESYLATE 5 MG/1
5 TABLET ORAL DAILY
Qty: 90 TABLET | Refills: 0 | Status: SHIPPED | OUTPATIENT
Start: 2023-12-18

## 2023-12-18 NOTE — TELEPHONE ENCOUNTER
Reason for call:   [x] Refill   [] Prior Auth  [] Other:     Office:   [x] PCP/Provider - [facility]  [] Specialty/Provider -     Medication: Tramadol 50 mg    Quantity: 30    Pharmacy: [id number] - [place], [place] -  [address]     Does the patient have enough for 3 days?   [] Yes   [x] No - Send as HP to POD

## 2023-12-19 RX ORDER — TRAMADOL HYDROCHLORIDE 50 MG/1
50 TABLET ORAL EVERY 8 HOURS PRN
Qty: 30 TABLET | Refills: 0 | Status: SHIPPED | OUTPATIENT
Start: 2023-12-19

## 2023-12-27 DIAGNOSIS — G47.9 SLEEP DISORDER: ICD-10-CM

## 2023-12-27 NOTE — TELEPHONE ENCOUNTER
Reason for call:   [x] Refill   [] Prior Auth  [] Other:     Office:   [x] PCP/Provider -   [] Specialty/Provider -     Medication: Ambien     Dose/Frequency: 10 mg    Quantity: #30    Pharmacy: [facility]    Does the patient have enough for 3 days?   [] Yes   [x] No - Send as HP to POD

## 2023-12-28 RX ORDER — ZOLPIDEM TARTRATE 10 MG/1
10 TABLET ORAL
Qty: 30 TABLET | Refills: 0 | Status: SHIPPED | OUTPATIENT
Start: 2023-12-28

## 2024-01-08 ENCOUNTER — NURSE TRIAGE (OUTPATIENT)
Age: 74
End: 2024-01-08

## 2024-01-08 NOTE — TELEPHONE ENCOUNTER
"Patient tested positive for covid on 1/04. Patient c/o low back pain and leg pain. He denies trouble breathing, denies cp, and fever. Patient is vaccinated. Patient is concerned about his lower back pain.  Patient scheduled for virtual appointment tomorrow with PCP.       Reason for Disposition   [1] COVID-19 diagnosed by positive lab test (e.g., PCR, rapid self-test kit) AND [2] mild symptoms (e.g., cough, fever, others) AND [3] no complications or SOB    Answer Assessment - Initial Assessment Questions  1. COVID-19 DIAGNOSIS: \"Who made your COVID-19 diagnosis?\" \"Was it confirmed by a positive lab test or self-test?\" If not diagnosed by a doctor (or NP/PA), ask \"Are there lots of cases (community spread) where you live?\" Note: See [facility] health department website, if unsure.      Positive test 1/04    2. COVID-19 EXPOSURE: \"Was there any known exposure to COVID before the symptoms began?\" CDC Definition of close contact: within 6 feet (2 meters) for a total of 15 minutes or more over a 24-hour period.       Unknown    3. ONSET: \"When did the COVID-19 symptoms start?\"       1/02    4. WORST SYMPTOM: \"What is your worst symptom?\" (e.g., cough, fever, shortness of breath, muscle aches)      Lower back pain    5. COUGH: \"Do you have a cough?\" If Yes, ask: \"How bad is the cough?\"        Denies    6. FEVER: \"Do you have a fever?\" If Yes, ask: \"What is your temperature, how was it measured, and when did it start?\"      Denies    7. RESPIRATORY STATUS: \"Describe your breathing?\" (e.g., shortness of breath, wheezing, unable to speak)       Denies    8. BETTER-SAME-WORSE: \"Are you getting better, staying the same or getting worse compared to yesterday?\"  If getting worse, ask, \"In what way?\"      Same    9. HIGH RISK DISEASE: \"Do you have any chronic medical problems?\" (e.g., asthma, heart or lung disease, weak immune system, obesity, etc.)        10. VACCINE: \"Have you had the COVID-19 vaccine?\" If Yes, ask: \"Which one, how " "many shots, when did you get it?\"        2 vaccines    11. BOOSTER: \"Have you received your COVID-19 booster?\" If Yes, ask: \"Which one and when did you get it?\"          12. PREGNANCY: \"Is there any chance you are pregnant?\" \"When was your last menstrual period?\"        N/A    13. OTHER SYMPTOMS: \"Do you have any other symptoms?\"  (e.g., chills, fatigue, headache, loss of smell or taste, muscle pain, sore throat)    Protocols used: Coronavirus (COVID-19) Diagnosed or Suspected-ADULT-AH    "

## 2024-01-09 ENCOUNTER — TELEMEDICINE (OUTPATIENT)
Dept: FAMILY MEDICINE CLINIC | Facility: CLINIC | Age: 74
End: 2024-01-09
Payer: COMMERCIAL

## 2024-01-09 DIAGNOSIS — U07.1 COVID-19: Primary | ICD-10-CM

## 2024-01-09 PROCEDURE — 99213 OFFICE O/P EST LOW 20 MIN: CPT | Performed by: FAMILY MEDICINE

## 2024-01-09 NOTE — PATIENT INSTRUCTIONS
PLENTY FLUIDS  REST  MUCINEX  MEDICATION AS DIRECTED  IF SYMPTOMS PERSIST OR WORSEN, PLEASE CALL

## 2024-01-09 NOTE — PROGRESS NOTES
COVID-19 Outpatient Progress Note    Assessment/Plan:    Problem List Items Addressed This Visit    None  Visit Diagnoses     COVID-19    -  Primary         Disposition:     Discussed symptom directed medication options with patient. Discussed vitamin D, vitamin C, and/or zinc supplementation with patient.     I have spent a total time of 15 minutes on the day of the encounter for this patient including risks and benefits of treatment options and impressions.       Encounter provider: Yifan Stone MD     Provider located at: [address] [id number]     Recent Visits  No visits were found meeting these conditions.  Showing recent visits within past 7 days and meeting all other requirements  Today's Visits  Date Type Provider Dept   01/09/24 Telemedicine Yifan Stone MD [facility]   Showing today's visits and meeting all other requirements  Future Appointments  No visits were found meeting these conditions.  Showing future appointments within next 150 days and meeting all other requirements     This virtual check-in was done via [employer] and patient was informed that this is a secure, HIPAA-compliant platform. He agrees to proceed.    Patient agrees to participate in a virtual check in via telephone or video visit instead of presenting to the office to address urgent/immediate medical needs. Patient is aware this is a billable service. He acknowledged consent and understanding of privacy and security of the video platform. The patient has agreed to participate and understands they can discontinue the visit at any time.    After connecting through [employer], the patient was identified by name and date of birth. Juan Vargas was informed that this was a telemedicine visit and that the exam was being conducted confidentially over secure lines. My office door was closed. Juan Vargas acknowledged consent and  understanding of privacy and security of the telemedicine visit. I informed the patient that I have reviewed his record in Epic and presented the opportunity for him to ask any questions regarding the visit today. The patient agreed to participate.     Verification of patient location:  Patient is located in the following state in which I hold an active license: [place]    Subjective:   Juan Vargas is a 73 y.o. male who is concerned about COVID-19. Patient is currently asymptomatic. Patient's symptoms include fatigue, malaise, myalgias and headache. Patient denies fever, chills, congestion, rhinorrhea, sore throat, anosmia, loss of taste, cough, shortness of breath, chest tightness, abdominal pain, nausea, vomiting and diarrhea.     - Date of symptom onset: 1/6/2024      COVID-19 vaccination status: Fully vaccinated with booster    Exposure:   Contact with a person who is under investigation (PUI) for or who is positive for COVID-19 within the last 14 days?: Yes    Hospitalized recently for fever and/or lower respiratory symptoms?: No      Currently a healthcare worker that is involved in direct patient care?: No      Works in a special setting where the risk of COVID-19 transmission may be high? (this may include long-term care, correctional and [facility] facilities; homeless shelters; assisted-living facilities and group homes.): No      Resident in a special setting where the risk of COVID-19 transmission may be high? (this may include long-term care, correctional and [facility] facilities; homeless shelters; assisted-living facilities and group homes.): No      Lab Results   Component Value Date    SARSCOV2 Negative 12/03/2021    SARSCOV2 Not Detected 11/02/2020       Review of Systems   Constitutional:  Positive for fatigue. Negative for chills and fever.   HENT:  Negative for congestion, rhinorrhea and sore throat.    Eyes:  Negative for discharge.   Respiratory:  Negative for cough, chest tightness and shortness  of breath.    Cardiovascular:  Negative for chest pain and palpitations.   Gastrointestinal:  Negative for abdominal pain, diarrhea, nausea and vomiting.   Musculoskeletal:  Positive for myalgias. Negative for arthralgias and gait problem.   Neurological:  Positive for headaches. Negative for dizziness and weakness.   Hematological:  Negative for adenopathy.   Psychiatric/Behavioral:  The patient is not nervous/anxious.      Current Outpatient Medications on File Prior to Visit   Medication Sig   • albuterol (PROVENTIL HFA,VENTOLIN HFA) 90 mcg/act inhaler Inhale 2 puffs every 4 (four) hours as needed for wheezing every 4 - 6 hours as needed   • amLODIPine (NORVASC) 5 mg tablet TAKE 1 TABLET(5 MG) BY MOUTH DAILY   • atorvastatin (LIPITOR) 20 mg tablet TAKE 1 TABLET(20 MG) BY MOUTH DAILY   • celecoxib (CeleBREX) 200 mg capsule Take 1 capsule (200 mg total) by mouth daily   • Cholecalciferol (VITAMIN D3 PO) Take 2,000 Units by mouth   • clotrimazole-betamethasone (LOTRISONE) 1-0.05 % lotion Apply topically 2 (two) times a day   • finasteride (PROSCAR) 5 mg tablet TAKE 1 TABLET(5 MG) BY MOUTH DAILY   • fluticasone (FLONASE) 50 mcg/act nasal spray 1 spray into each nostril daily   • hydrOXYzine HCL (ATARAX) 10 mg tablet Take 1 tablet (10 mg total) by mouth daily as needed for itching   • IRON, FERROUS SULFATE, PO Take by mouth every other day    • mometasone (NASONEX) 50 mcg/act nasal spray 2 sprays into each nostril daily   • Naproxen Sodium 220 MG CAPS Take by mouth as needed    • Pseudoephedrine HCl (SUDAFED PO) Take by mouth as needed   • traMADol (ULTRAM) 50 mg tablet Take 1 tablet (50 mg total) by mouth every 8 (eight) hours as needed for moderate pain   • vitamin B-12 (CYANOCOBALAMIN) 100 MCG TABS Take 2,000 mcg by mouth once a week     • zolpidem (AMBIEN) 10 mg tablet Take 1 tablet (10 mg total) by mouth daily at bedtime as needed for sleep 1/2 to 1 PRN       Objective:    There were no vitals taken for this  visit.       Physical Exam    PATIENT VISUALLY APPEARS  IN NO OBVIOUS DISTRESS    Yifan Stone MD

## 2024-01-11 DIAGNOSIS — R03.0 ELEVATED BLOOD-PRESSURE READING WITHOUT DIAGNOSIS OF HYPERTENSION: ICD-10-CM

## 2024-01-11 DIAGNOSIS — Z00.00 ROUTINE GENERAL MEDICAL EXAMINATION AT A HEALTH CARE FACILITY: ICD-10-CM

## 2024-01-11 DIAGNOSIS — N40.0 BENIGN PROSTATIC HYPERPLASIA, UNSPECIFIED WHETHER LOWER URINARY TRACT SYMPTOMS PRESENT: ICD-10-CM

## 2024-01-11 DIAGNOSIS — K21.9 LPRD (LARYNGOPHARYNGEAL REFLUX DISEASE): ICD-10-CM

## 2024-01-11 DIAGNOSIS — Z13.29 SCREENING FOR THYROID DISORDER: ICD-10-CM

## 2024-01-11 DIAGNOSIS — E78.2 MIXED HYPERLIPIDEMIA: Primary | ICD-10-CM

## 2024-01-23 DIAGNOSIS — T84.498S FAILED ORTHOPEDIC IMPLANT, SEQUELA: ICD-10-CM

## 2024-01-23 NOTE — TELEPHONE ENCOUNTER
Please Review. Refill must be reviewed and completed by the office or provider. The refill is unable to be approved by the medication management team.

## 2024-01-23 NOTE — TELEPHONE ENCOUNTER
Reason for call:   [x] Refill   [] Prior Auth  [] Other:     Office:   [x] PCP/Provider -   [] Specialty/Provider -     Medication: tramadol 50 mg, every 8 hours prn, 30 tablets    [facility]     Does the patient have enough for 3 days?   [] Yes   [x] No - Send as HP to POD

## 2024-01-24 RX ORDER — TRAMADOL HYDROCHLORIDE 50 MG/1
50 TABLET ORAL EVERY 8 HOURS PRN
Qty: 30 TABLET | Refills: 0 | Status: SHIPPED | OUTPATIENT
Start: 2024-01-24

## 2024-01-27 LAB
ALBUMIN SERPL-MCNC: 4.6 G/DL (ref 3.6–5.1)
ALBUMIN/GLOB SERPL: 2.4 (CALC) (ref 1–2.5)
ALP SERPL-CCNC: 75 U/L (ref 35–144)
ALT SERPL-CCNC: 16 U/L (ref 9–46)
AST SERPL-CCNC: 20 U/L (ref 10–35)
BASOPHILS # BLD AUTO: 42 CELLS/UL (ref 0–200)
BASOPHILS NFR BLD AUTO: 1.1 %
BILIRUB SERPL-MCNC: 0.7 MG/DL (ref 0.2–1.2)
BUN SERPL-MCNC: 18 MG/DL (ref 7–25)
BUN/CREAT SERPL: NORMAL (CALC) (ref 6–22)
CALCIUM SERPL-MCNC: 9.8 MG/DL (ref 8.6–10.3)
CHLORIDE SERPL-SCNC: 103 MMOL/L (ref 98–110)
CHOLEST SERPL-MCNC: 172 MG/DL
CHOLEST/HDLC SERPL: 2.6 (CALC)
CO2 SERPL-SCNC: 29 MMOL/L (ref 20–32)
CREAT SERPL-MCNC: 0.77 MG/DL (ref 0.7–1.28)
EOSINOPHIL # BLD AUTO: 182 CELLS/UL (ref 15–500)
EOSINOPHIL NFR BLD AUTO: 4.8 %
ERYTHROCYTE [DISTWIDTH] IN BLOOD BY AUTOMATED COUNT: 13.2 % (ref 11–15)
GFR/BSA.PRED SERPLBLD CYS-BASED-ARV: 93 ML/MIN/1.73M2
GLOBULIN SER CALC-MCNC: 1.9 G/DL (CALC) (ref 1.9–3.7)
GLUCOSE SERPL-MCNC: 81 MG/DL (ref 65–99)
HCT VFR BLD AUTO: 44.8 % (ref 38.5–50)
HDLC SERPL-MCNC: 67 MG/DL
HGB BLD-MCNC: 15.1 G/DL (ref 13.2–17.1)
LDLC SERPL CALC-MCNC: 86 MG/DL (CALC)
LYMPHOCYTES # BLD AUTO: 1041 CELLS/UL (ref 850–3900)
LYMPHOCYTES NFR BLD AUTO: 27.4 %
MCH RBC QN AUTO: 29.8 PG (ref 27–33)
MCHC RBC AUTO-ENTMCNC: 33.7 G/DL (ref 32–36)
MCV RBC AUTO: 88.4 FL (ref 80–100)
MONOCYTES # BLD AUTO: 414 CELLS/UL (ref 200–950)
MONOCYTES NFR BLD AUTO: 10.9 %
NEUTROPHILS # BLD AUTO: 2120 CELLS/UL (ref 1500–7800)
NEUTROPHILS NFR BLD AUTO: 55.8 %
NONHDLC SERPL-MCNC: 105 MG/DL (CALC)
PLATELET # BLD AUTO: 176 THOUSAND/UL (ref 140–400)
PMV BLD REES-ECKER: 11.3 FL (ref 7.5–12.5)
POTASSIUM SERPL-SCNC: 4.3 MMOL/L (ref 3.5–5.3)
PROT SERPL-MCNC: 6.5 G/DL (ref 6.1–8.1)
PSA SERPL-MCNC: 1.21 NG/ML
RBC # BLD AUTO: 5.07 MILLION/UL (ref 4.2–5.8)
SODIUM SERPL-SCNC: 140 MMOL/L (ref 135–146)
TRIGL SERPL-MCNC: 91 MG/DL
TSH SERPL-ACNC: 2.13 MIU/L (ref 0.4–4.5)
WBC # BLD AUTO: 3.8 THOUSAND/UL (ref 3.8–10.8)

## 2024-02-05 DIAGNOSIS — N40.0 PROSTATIC HYPERTROPHY: ICD-10-CM

## 2024-02-05 DIAGNOSIS — G47.9 SLEEP DISORDER: ICD-10-CM

## 2024-02-05 DIAGNOSIS — E78.01 FAMILIAL HYPERCHOLESTEROLEMIA: ICD-10-CM

## 2024-02-05 RX ORDER — ZOLPIDEM TARTRATE 10 MG/1
10 TABLET ORAL
Qty: 30 TABLET | Refills: 0 | Status: SHIPPED | OUTPATIENT
Start: 2024-02-05

## 2024-02-05 NOTE — TELEPHONE ENCOUNTER
Reason for call:   [x] Refill   [] Prior Auth  [] Other:     Office:   [x] PCP/Provider -   [] Specialty/Provider -       zolpidem (AMBIEN) 10 mg tablet     Dose: 10 mg Route: Oral Frequency: Daily at bedtime PRN for sleep  Dispense Quantity: 30 tablet  Sig: Take 1 tablet (10 mg total) by mouth daily at bedtime as needed for sleep 1/2 to 1 PRN      Pharmacy  [id number]     Does the patient have enough for 3 days?   [x] Yes   [] No - Send as HP to POD

## 2024-02-06 RX ORDER — FINASTERIDE 5 MG/1
TABLET, FILM COATED ORAL
Qty: 90 TABLET | Refills: 1 | Status: SHIPPED | OUTPATIENT
Start: 2024-02-06

## 2024-02-06 RX ORDER — ATORVASTATIN CALCIUM 20 MG/1
TABLET, FILM COATED ORAL
Qty: 90 TABLET | Refills: 1 | Status: SHIPPED | OUTPATIENT
Start: 2024-02-06

## 2024-02-13 NOTE — PATIENT INSTRUCTIONS
DISCUSSED HEALTH MAINTENENCE ISSUES  BW WILL BE REVIEWED  ENCOURAGED HEALTHY DIET AND EXERCISE  COLONOSCOPY WILL BE ORDERED  RV FOR ANNUAL HEALTH EXAM IN 1 YEAR  RV SOONER IF THERE ARE ANY CONCERNS      RV FOR OPIOID MANAGEMENT VISIT IN 3 M    Recent Results (from the past 672 hour(s))   Lipid panel    Collection Time: 01/26/24 10:10 AM   Result Value Ref Range    Total Cholesterol 172 <200 mg/dL    HDL 67 > OR = 40 mg/dL    Triglycerides 91 <150 mg/dL    LDL Calculated 86 mg/dL (calc)    Chol HDLC Ratio 2.6 <5.0 (calc)    Non-HDL Cholesterol 105 <130 mg/dL (calc)   Comprehensive metabolic panel    Collection Time: 01/26/24 10:10 AM   Result Value Ref Range    Glucose, Random 81 65 - 99 mg/dL    BUN 18 7 - 25 mg/dL    Creatinine 0.77 0.70 - 1.28 mg/dL    eGFR 93 > OR = 60 mL/min/1.73m2    SL AMB BUN/CREATININE RATIO SEE NOTE: 6 - 22 (calc)    Sodium 140 135 - 146 mmol/L    Potassium 4.3 3.5 - 5.3 mmol/L    Chloride 103 98 - 110 mmol/L    CO2 29 20 - 32 mmol/L    Calcium 9.8 8.6 - 10.3 mg/dL    Protein, Total 6.5 6.1 - 8.1 g/dL    Albumin 4.6 3.6 - 5.1 g/dL    Globulin 1.9 1.9 - 3.7 g/dL (calc)    Albumin/Globulin Ratio 2.4 1.0 - 2.5 (calc)    TOTAL BILIRUBIN 0.7 0.2 - 1.2 mg/dL    Alkaline Phosphatase 75 35 - 144 U/L    AST 20 10 - 35 U/L    ALT 16 9 - 46 U/L   CBC and differential    Collection Time: 01/26/24 10:10 AM   Result Value Ref Range    White Blood Cell Count 3.8 3.8 - 10.8 Thousand/uL    Red Blood Cell Count 5.07 4.20 - 5.80 Million/uL    Hemoglobin 15.1 13.2 - 17.1 g/dL    HCT 44.8 38.5 - 50.0 %    MCV 88.4 80.0 - 100.0 fL    MCH 29.8 27.0 - 33.0 pg    MCHC 33.7 32.0 - 36.0 g/dL    RDW 13.2 11.0 - 15.0 %    Platelet Count 176 140 - 400 Thousand/uL    SL AMB MPV 11.3 7.5 - 12.5 fL    Neutrophils (Absolute) 2,120 1,500 - 7,800 cells/uL    Lymphocytes (Absolute) 1,041 850 - 3,900 cells/uL    Monocytes (Absolute) 414 200 - 950 cells/uL    Eosinophils (Absolute) 182 15 - 500 cells/uL    Basophils ABS 42 0 -  200 cells/uL    Neutrophils 55.8 %    Lymphocytes 27.4 %    Monocytes 10.9 %    Eosinophils 4.8 %    Basophils PCT 1.1 %   TSH, 3rd generation    Collection Time: 01/26/24 10:10 AM   Result Value Ref Range    TSH 2.13 0.40 - 4.50 mIU/L   PSA, Total (REFL)    Collection Time: 01/26/24 10:10 AM   Result Value Ref Range    Prostate Specific Antigen Total 1.21 < OR = 4.00 ng/mL

## 2024-02-14 ENCOUNTER — OFFICE VISIT (OUTPATIENT)
Dept: FAMILY MEDICINE CLINIC | Facility: CLINIC | Age: 74
End: 2024-02-14
Payer: MEDICARE

## 2024-02-14 VITALS
RESPIRATION RATE: 16 BRPM | DIASTOLIC BLOOD PRESSURE: 68 MMHG | HEIGHT: 66 IN | OXYGEN SATURATION: 98 % | TEMPERATURE: 97.1 F | WEIGHT: 150 LBS | BODY MASS INDEX: 24.11 KG/M2 | SYSTOLIC BLOOD PRESSURE: 126 MMHG | HEART RATE: 74 BPM

## 2024-02-14 DIAGNOSIS — Z00.00 MEDICARE ANNUAL WELLNESS VISIT, INITIAL: ICD-10-CM

## 2024-02-14 DIAGNOSIS — M51.36 DDD (DEGENERATIVE DISC DISEASE), LUMBAR: ICD-10-CM

## 2024-02-14 DIAGNOSIS — F34.1 DYSTHYMIA: ICD-10-CM

## 2024-02-14 DIAGNOSIS — Z12.5 ENCOUNTER FOR PROSTATE CANCER SCREENING: ICD-10-CM

## 2024-02-14 DIAGNOSIS — J30.1 SEASONAL ALLERGIC RHINITIS DUE TO POLLEN: ICD-10-CM

## 2024-02-14 DIAGNOSIS — F11.20 CONTINUOUS OPIOID DEPENDENCE (HCC): ICD-10-CM

## 2024-02-14 DIAGNOSIS — R03.0 ELEVATED BLOOD-PRESSURE READING WITHOUT DIAGNOSIS OF HYPERTENSION: Primary | ICD-10-CM

## 2024-02-14 DIAGNOSIS — G47.33 OBSTRUCTIVE SLEEP APNEA SYNDROME: ICD-10-CM

## 2024-02-14 DIAGNOSIS — R35.1 BENIGN PROSTATIC HYPERPLASIA WITH NOCTURIA: ICD-10-CM

## 2024-02-14 DIAGNOSIS — E78.2 MIXED HYPERLIPIDEMIA: ICD-10-CM

## 2024-02-14 DIAGNOSIS — J31.0 CHRONIC RHINITIS: ICD-10-CM

## 2024-02-14 DIAGNOSIS — M15.9 PRIMARY OSTEOARTHRITIS INVOLVING MULTIPLE JOINTS: ICD-10-CM

## 2024-02-14 DIAGNOSIS — N40.1 BENIGN PROSTATIC HYPERPLASIA WITH NOCTURIA: ICD-10-CM

## 2024-02-14 DIAGNOSIS — Z12.11 COLON CANCER SCREENING: ICD-10-CM

## 2024-02-14 DIAGNOSIS — K21.9 LPRD (LARYNGOPHARYNGEAL REFLUX DISEASE): ICD-10-CM

## 2024-02-14 DIAGNOSIS — I71.20 THORACIC AORTIC ANEURYSM WITHOUT RUPTURE, UNSPECIFIED PART (HCC): ICD-10-CM

## 2024-02-14 LAB — SL AMB POCT FECES OCC BLD: NORMAL

## 2024-02-14 PROCEDURE — 99215 OFFICE O/P EST HI 40 MIN: CPT | Performed by: FAMILY MEDICINE

## 2024-02-14 PROCEDURE — G0403 EKG FOR INITIAL PREVENT EXAM: HCPCS | Performed by: FAMILY MEDICINE

## 2024-02-14 PROCEDURE — G0402 INITIAL PREVENTIVE EXAM: HCPCS | Performed by: FAMILY MEDICINE

## 2024-02-14 PROCEDURE — 82270 OCCULT BLOOD FECES: CPT | Performed by: FAMILY MEDICINE

## 2024-02-14 RX ORDER — ALFUZOSIN HYDROCHLORIDE 10 MG/1
10 TABLET, EXTENDED RELEASE ORAL DAILY
Qty: 90 TABLET | Refills: 1 | Status: SHIPPED | OUTPATIENT
Start: 2024-02-14

## 2024-02-14 NOTE — LETTER
CIERRA VAUGHN      Current Outpatient Medications:     albuterol (PROVENTIL HFA,VENTOLIN HFA) 90 mcg/act inhaler, Inhale 2 puffs every 4 (four) hours as needed for wheezing every 4 - 6 hours as needed, Disp: 18 g, Rfl: 3    amLODIPine (NORVASC) 5 mg tablet, TAKE 1 TABLET(5 MG) BY MOUTH DAILY, Disp: 90 tablet, Rfl: 0    atorvastatin (LIPITOR) 20 mg tablet, TAKE 1 TABLET(20 MG) BY MOUTH DAILY, Disp: 90 tablet, Rfl: 1    celecoxib (CeleBREX) 200 mg capsule, Take 1 capsule (200 mg total) by mouth daily, Disp: 30 capsule, Rfl: 2    Cholecalciferol (VITAMIN D3 PO), Take 2,000 Units by mouth, Disp: , Rfl:     clotrimazole-betamethasone (LOTRISONE) 1-0.05 % lotion, Apply topically 2 (two) times a day, Disp: 30 mL, Rfl: 3    finasteride (PROSCAR) 5 mg tablet, TAKE 1 TABLET(5 MG) BY MOUTH DAILY, Disp: 90 tablet, Rfl: 1    fluticasone (FLONASE) 50 mcg/act nasal spray, 1 spray into each nostril daily, Disp: 1 g, Rfl: 0    hydrOXYzine HCL (ATARAX) 10 mg tablet, Take 1 tablet (10 mg total) by mouth daily as needed for itching, Disp: 30 tablet, Rfl: 3    IRON, FERROUS SULFATE, PO, Take by mouth every other day , Disp: , Rfl:     mometasone (NASONEX) 50 mcg/act nasal spray, 2 sprays into each nostril daily, Disp: 17 g, Rfl: 3    Pseudoephedrine HCl (SUDAFED PO), Take by mouth as needed, Disp: , Rfl:     traMADol (ULTRAM) 50 mg tablet, Take 1 tablet (50 mg total) by mouth every 8 (eight) hours as needed for moderate pain, Disp: 30 tablet, Rfl: 0    vitamin B-12 (CYANOCOBALAMIN) 100 MCG TABS, Take 2,000 mcg by mouth once a week  , Disp: , Rfl:     zolpidem (AMBIEN) 10 mg tablet, Take 1 tablet (10 mg total) by mouth daily at bedtime as needed for sleep 1/2 to 1 PRN, Disp: 30 tablet, Rfl: 0      Recent Results (from the past 672 hour(s))   Lipid panel    Collection Time: 01/26/24 10:10 AM   Result Value Ref Range    Total Cholesterol 172 <200 mg/dL    HDL 67 > OR = 40 mg/dL    Triglycerides 91 <150 mg/dL    LDL Calculated 86 mg/dL  (calc)    Chol HDLC Ratio 2.6 <5.0 (calc)    Non-HDL Cholesterol 105 <130 mg/dL (calc)   Comprehensive metabolic panel    Collection Time: 01/26/24 10:10 AM   Result Value Ref Range    Glucose, Random 81 65 - 99 mg/dL    BUN 18 7 - 25 mg/dL    Creatinine 0.77 0.70 - 1.28 mg/dL    eGFR 93 > OR = 60 mL/min/1.73m2    SL AMB BUN/CREATININE RATIO SEE NOTE: 6 - 22 (calc)    Sodium 140 135 - 146 mmol/L    Potassium 4.3 3.5 - 5.3 mmol/L    Chloride 103 98 - 110 mmol/L    CO2 29 20 - 32 mmol/L    Calcium 9.8 8.6 - 10.3 mg/dL    Protein, Total 6.5 6.1 - 8.1 g/dL    Albumin 4.6 3.6 - 5.1 g/dL    Globulin 1.9 1.9 - 3.7 g/dL (calc)    Albumin/Globulin Ratio 2.4 1.0 - 2.5 (calc)    TOTAL BILIRUBIN 0.7 0.2 - 1.2 mg/dL    Alkaline Phosphatase 75 35 - 144 U/L    AST 20 10 - 35 U/L    ALT 16 9 - 46 U/L   CBC and differential    Collection Time: 01/26/24 10:10 AM   Result Value Ref Range    White Blood Cell Count 3.8 3.8 - 10.8 Thousand/uL    Red Blood Cell Count 5.07 4.20 - 5.80 Million/uL    Hemoglobin 15.1 13.2 - 17.1 g/dL    HCT 44.8 38.5 - 50.0 %    MCV 88.4 80.0 - 100.0 fL    MCH 29.8 27.0 - 33.0 pg    MCHC 33.7 32.0 - 36.0 g/dL    RDW 13.2 11.0 - 15.0 %    Platelet Count 176 140 - 400 Thousand/uL    SL AMB MPV 11.3 7.5 - 12.5 fL    Neutrophils (Absolute) 2,120 1,500 - 7,800 cells/uL    Lymphocytes (Absolute) 1,041 850 - 3,900 cells/uL    Monocytes (Absolute) 414 200 - 950 cells/uL    Eosinophils (Absolute) 182 15 - 500 cells/uL    Basophils ABS 42 0 - 200 cells/uL    Neutrophils 55.8 %    Lymphocytes 27.4 %    Monocytes 10.9 %    Eosinophils 4.8 %    Basophils PCT 1.1 %   TSH, 3rd generation    Collection Time: 01/26/24 10:10 AM   Result Value Ref Range    TSH 2.13 0.40 - 4.50 mIU/L   PSA, Total (REFL)    Collection Time: 01/26/24 10:10 AM   Result Value Ref Range    Prostate Specific Antigen Total 1.21 < OR = 4.00 ng/mL

## 2024-02-14 NOTE — PROGRESS NOTES
Assessment and Plan:     Problem List Items Addressed This Visit        Digestive    LPRD (laryngopharyngeal reflux disease)       Respiratory    Chronic rhinitis    Obstructive sleep apnea syndrome    Seasonal allergic rhinitis due to pollen       Cardiovascular and Mediastinum    Thoracic aortic aneurysm without rupture (HCC)       Musculoskeletal and Integument    Osteoarthritis    DDD (degenerative disc disease), lumbar       Genitourinary    BPH (benign prostatic hyperplasia)    Relevant Medications    alfuzosin (UROXATRAL) 10 mg 24 hr tablet       Other    Mixed hyperlipidemia    Dysthymia    Continuous opioid dependence (HCC)    Elevated blood-pressure reading without diagnosis of hypertension - Primary    Relevant Orders    POCT ECG (Completed)   Other Visit Diagnoses     Colon cancer screening        Relevant Orders    POCT hemoccult screening (Completed)    Encounter for prostate cancer screening        Medicare annual wellness visit, initial              Depression Screening and Follow-up Plan: Patient was screened for depression during today's encounter. They screened negative with a PHQ-9 score of 1.      Preventive health issues were discussed with patient, and age appropriate screening tests were ordered as noted in patient's After Visit Summary.  Personalized health advice and appropriate referrals for health education or preventive services given if needed, as noted in patient's After Visit Summary.     History of Present Illness:     Patient presents for a Medicare Wellness Visit    PATIENT RETURNS FOR ANNUAL WELLNESS EXAM AND ANNUAL EVALUATION OF PATIENT'S MEDICAL ISSUES    INDIVIDUAL MEDICAL ISSUES WITH THEIR CURRENT STATUS, ASSESSMENT AND PLANS ARE LISTED ABOVE      DISCUSSED HEALTH ISSUES       Patient Care Team:  Yifan Stone MD as PCP - General (Family Medicine)     Review of Systems:     Review of Systems   Constitutional:  Negative for chills, fatigue and fever.   HENT:  Negative  for congestion, ear discharge, ear pain, mouth sores, postnasal drip, sore throat and trouble swallowing.    Eyes:  Negative for pain, discharge and visual disturbance.   Respiratory:  Negative for cough, shortness of breath and wheezing.    Cardiovascular:  Negative for chest pain, palpitations and leg swelling.   Gastrointestinal:  Negative for abdominal distention, abdominal pain, blood in stool, diarrhea and nausea.   Endocrine: Negative for polydipsia, polyphagia and polyuria.   Genitourinary:  Positive for frequency. Negative for dysuria, hematuria and urgency.   Musculoskeletal:  Positive for arthralgias. Negative for gait problem and joint swelling.   Skin:  Negative for pallor and rash.   Neurological:  Negative for dizziness, syncope, speech difficulty, weakness, light-headedness, numbness and headaches.   Hematological:  Negative for adenopathy.   Psychiatric/Behavioral:  Negative for behavioral problems, confusion and sleep disturbance. The patient is not nervous/anxious.         Problem List:     Patient Active Problem List   Diagnosis   • Chronic rhinitis   • Deviated nasal septum   • Mixed hyperlipidemia   • Failed orthopedic implant (HCC)   • LPRD (laryngopharyngeal reflux disease)   • Osteoarthritis   • Obstructive sleep apnea syndrome   • Thoracic aortic aneurysm without rupture (HCC)   • Dysthymia   • Seasonal allergic rhinitis due to pollen   • Hepatitis B surface antigen positive   • Vitamin D deficiency   • Family discord   • Continuous opioid dependence (HCC)   • B12 deficiency   • Atherosclerosis   • BPH (benign prostatic hyperplasia)   • DDD (degenerative disc disease), lumbar   • Elevated blood-pressure reading without diagnosis of hypertension      Past Medical and Surgical History:     Past Medical History:   Diagnosis Date   • Arthritis    • Bilateral chronic knee pain    • Hyperlipidemia    • Sleep apnea      Past Surgical History:   Procedure Laterality Date   • HIP SURGERY Left      NISHA   • HIP SURGERY Right     revision   • KIDNEY STONE SURGERY     • KIDNEY STONE SURGERY     • KNEE SURGERY Right    • KNEE SURGERY Right     ligament   • KNEE SURGERY Right     ligament   • NASAL SINUS SURGERY     • TONSILECTOMY AND ADNOIDECTOMY     • TOTAL HIP ARTHROPLASTY Right       Family History:     Family History   Problem Relation Age of Onset   • Cancer Mother    • Arthritis Mother    • Prostate cancer Father    • Hyperlipidemia Father    • Hypertension Father    • Prostate cancer Paternal Uncle    • Mental illness Neg Hx    • Substance Abuse Neg Hx       Social History:     Social History     Socioeconomic History   • Marital status: /Civil Union     Spouse name: None   • Number of children: None   • Years of education: None   • Highest education level: None   Occupational History   • None   Tobacco Use   • Smoking status: Never   • Smokeless tobacco: Never   Vaping Use   • Vaping status: Never Used   Substance and Sexual Activity   • Alcohol use: Yes   • Drug use: Not Currently   • Sexual activity: Yes     Partners: Female     Birth control/protection: None   Other Topics Concern   • None   Social History Narrative   • None     Social Determinants of Health     Financial Resource Strain: Low Risk  (2/14/2024)    Overall Financial Resource Strain (CARDIA)    • Difficulty of Paying Living Expenses: Not very hard   Food Insecurity: Not on file   Transportation Needs: No Transportation Needs (2/14/2024)    PRAPARE - Transportation    • Lack of Transportation (Medical): No    • Lack of Transportation (Non-Medical): No   Physical Activity: Not on file   Stress: Not on file   Social Connections: Not on file   Intimate Partner Violence: Not on file   Housing Stability: Not on file      Medications and Allergies:     Current Outpatient Medications   Medication Sig Dispense Refill   • albuterol (PROVENTIL HFA,VENTOLIN HFA) 90 mcg/act inhaler Inhale 2 puffs every 4 (four) hours as needed for wheezing every  4 - 6 hours as needed 18 g 3   • alfuzosin (UROXATRAL) 10 mg 24 hr tablet Take 1 tablet (10 mg total) by mouth daily 90 tablet 1   • amLODIPine (NORVASC) 5 mg tablet TAKE 1 TABLET(5 MG) BY MOUTH DAILY 90 tablet 0   • Aspirin 325 MG CAPS Take by mouth     • atorvastatin (LIPITOR) 20 mg tablet TAKE 1 TABLET(20 MG) BY MOUTH DAILY 90 tablet 1   • celecoxib (CeleBREX) 200 mg capsule Take 1 capsule (200 mg total) by mouth daily 30 capsule 2   • Cholecalciferol (VITAMIN D3 PO) Take 2,000 Units by mouth     • clotrimazole-betamethasone (LOTRISONE) 1-0.05 % lotion Apply topically 2 (two) times a day 30 mL 3   • finasteride (PROSCAR) 5 mg tablet TAKE 1 TABLET(5 MG) BY MOUTH DAILY 90 tablet 1   • fluticasone (FLONASE) 50 mcg/act nasal spray 1 spray into each nostril daily 1 g 0   • hydrOXYzine HCL (ATARAX) 10 mg tablet Take 1 tablet (10 mg total) by mouth daily as needed for itching 30 tablet 3   • IRON, FERROUS SULFATE, PO Take by mouth every other day      • mometasone (NASONEX) 50 mcg/act nasal spray 2 sprays into each nostril daily 17 g 3   • Pseudoephedrine HCl (SUDAFED PO) Take by mouth as needed     • traMADol (ULTRAM) 50 mg tablet Take 1 tablet (50 mg total) by mouth every 8 (eight) hours as needed for moderate pain 30 tablet 0   • zolpidem (AMBIEN) 10 mg tablet Take 1 tablet (10 mg total) by mouth daily at bedtime as needed for sleep 1/2 to 1 PRN 30 tablet 0     No current facility-administered medications for this visit.     Allergies   Allergen Reactions   • Meloxicam Other (See Comments)     suicidial / itching    • Medical Tape Itching      Immunizations:     Immunization History   Administered Date(s) Administered   • COVID-19 PFIZER VACCINE 0.3 ML IM 03/01/2021, 03/22/2021, 09/30/2021   • INFLUENZA 09/20/2021   • Influenza, high dose seasonal 0.7 mL 10/22/2020, 12/09/2022   • Pneumococcal Conjugate 13-Valent 12/18/2015, 11/01/2017   • Pneumococcal Polysaccharide PPV23 11/20/2019   • Tdap 12/09/2022   •  influenza, trivalent, adjuvanted 09/30/2021      Health Maintenance:         Topic Date Due   • Colorectal Cancer Screening  08/18/2025   • Hepatitis C Screening  Completed         Topic Date Due   • Influenza Vaccine (1) 09/01/2023   • COVID-19 Vaccine (4 - 2023-24 season) 09/01/2023      Medicare Screening Tests and Risk Assessments:         Health Risk Assessment:   Patient rates overall health as good. Patient feels that their physical health rating is same. Patient is satisfied with their life. Eyesight was rated as same. Hearing was rated as same. Patient feels that their emotional and mental health rating is same. Patients states they are sometimes angry. Patient states they are often unusually tired/fatigued. Pain experienced in the last 7 days has been a lot. Patient's pain rating has been 5/10. Patient states that he has experienced no weight loss or gain in last 6 months.     Depression Screening:   PHQ-9 Score: 1      Fall Risk Screening:   In the past year, patient has experienced: no history of falling in past year      Home Safety:  Patient does not have trouble with stairs inside or outside of their home. Patient has working smoke alarms and has working carbon monoxide detector. Home safety hazards include: none.     Nutrition:   Current diet is Regular.     Medications:   Patient is not currently taking any over-the-counter supplements. Patient is able to manage medications.     Activities of Daily Living (ADLs)/Instrumental Activities of Daily Living (IADLs):   Walk and transfer into and out of bed and chair?: Yes  Dress and groom yourself?: Yes    Bathe or shower yourself?: Yes    Feed yourself? Yes  Do your laundry/housekeeping?: Yes  Manage your money, pay your bills and track your expenses?: Yes  Make your own meals?: Yes      Previous Hospitalizations:   Any hospitalizations or ED visits within the last 12 months?: No      Advance Care Planning:   Living will: No    End of Life Decisions  "reviewed with patient: No      Cognitive Screening:   Provider or family/friend/caregiver concerned regarding cognition?: No    PREVENTIVE SCREENINGS      Cardiovascular Screening:    General: Screening Not Indicated and History Lipid Disorder      Diabetes Screening:     General: Screening Current      Colorectal Cancer Screening:     General: Screening Current      Prostate Cancer Screening:    General: Screening Not Indicated      Osteoporosis Screening:    General: Screening Not Indicated      Abdominal Aortic Aneurysm (AAA) Screening:    Risk factors include: age between 65-74 yo        General: Screening Not Indicated      Lung Cancer Screening:     General: Screening Not Indicated      Hepatitis C Screening:    General: Screening Current    Screening, Brief Intervention, and Referral to Treatment (SBIRT)    Screening  Typical number of drinks in a day: 1  Typical number of drinks in a week: 4  Interpretation: Low risk drinking behavior.    Single Item Drug Screening:  How often have you used an illegal drug (including marijuana) or a prescription medication for non-medical reasons in the past year? never    Single Item Drug Screen Score: 0  Interpretation: Negative screen for possible drug use disorder    Review of Current Opioid Use    Opioid Risk Tool (ORT) Interpretation: Complete Opioid Risk Tool (ORT)    No results found.     Physical Exam:     /68 (BP Location: Right arm, Patient Position: Sitting, Cuff Size: Standard)   Pulse 74   Temp (!) 97.1 °F (36.2 °C) (Temporal)   Resp 16   Ht 5' 5.5\" (1.664 m)   Wt 68 kg (150 lb)   SpO2 98%   BMI 24.58 kg/m²     Physical Exam  Constitutional:       General: He is not in acute distress.     Appearance: Normal appearance. He is well-developed and normal weight. He is not ill-appearing or diaphoretic.   HENT:      Head: Normocephalic and atraumatic.      Right Ear: Tympanic membrane and external ear normal.      Left Ear: Tympanic membrane and external " ear normal.      Nose: Nose normal.      Mouth/Throat:      Mouth: Mucous membranes are moist.      Pharynx: No oropharyngeal exudate.   Eyes:      General: No scleral icterus.        Right eye: No discharge.         Left eye: No discharge.      Conjunctiva/sclera: Conjunctivae normal.      Pupils: Pupils are equal, round, and reactive to light.   Neck:      Thyroid: No thyromegaly.      Vascular: No JVD.      Trachea: No tracheal deviation.   Cardiovascular:      Rate and Rhythm: Normal rate and regular rhythm.      Heart sounds: Normal heart sounds. No murmur heard.     No friction rub. No gallop.   Pulmonary:      Effort: Pulmonary effort is normal. No respiratory distress.      Breath sounds: Normal breath sounds. No stridor. No wheezing or rales.   Chest:      Chest wall: No tenderness.   Abdominal:      General: Bowel sounds are normal. There is no distension.      Palpations: Abdomen is soft. There is no mass.      Tenderness: There is no abdominal tenderness. There is no guarding or rebound.      Hernia: No hernia is present.   Genitourinary:     Penis: Normal. No tenderness.       Testes: Normal.      Prostate: Normal.      Rectum: Normal. Guaiac result negative.   Musculoskeletal:         General: No tenderness or deformity. Normal range of motion.      Cervical back: Normal range of motion and neck supple.      Comments: MILD DJD CHANGES     Lymphadenopathy:      Cervical: No cervical adenopathy.   Skin:     General: Skin is warm and dry.      Coloration: Skin is not pale.      Findings: No erythema or rash.   Neurological:      General: No focal deficit present.      Mental Status: He is alert and oriented to person, place, and time.      Cranial Nerves: No cranial nerve deficit.      Sensory: No sensory deficit.      Motor: No weakness or abnormal muscle tone.      Coordination: Coordination normal.      Gait: Gait normal.      Deep Tendon Reflexes: Reflexes normal.   Psychiatric:         Mood and  Affect: Mood normal.         Behavior: Behavior normal.         Thought Content: Thought content normal.         Judgment: Judgment normal.        DISCUSSED HEALTH ISSUES    LENGTH OF VISIT 60 MIN    Yifan Stone MD

## 2024-03-01 DIAGNOSIS — T84.498S FAILED ORTHOPEDIC IMPLANT, SEQUELA: ICD-10-CM

## 2024-03-01 DIAGNOSIS — G47.9 SLEEP DISORDER: ICD-10-CM

## 2024-03-01 RX ORDER — ZOLPIDEM TARTRATE 10 MG/1
10 TABLET ORAL
Qty: 30 TABLET | Refills: 0 | Status: SHIPPED | OUTPATIENT
Start: 2024-03-01

## 2024-03-01 RX ORDER — TRAMADOL HYDROCHLORIDE 50 MG/1
50 TABLET ORAL EVERY 8 HOURS PRN
Qty: 30 TABLET | Refills: 0 | Status: SHIPPED | OUTPATIENT
Start: 2024-03-01

## 2024-03-01 NOTE — TELEPHONE ENCOUNTER
Reason for call:   [x] Refill   [] Prior Auth  [] Other:     Office:   [x] PCP/Provider -   [] Specialty/Provider -     Medication:   Tramadol 50mg- take 1 tablet by mouth every 8 hours as needed for moderate pain  Zolpidem 10mg- take 1 tablet by mouth daily at bedtime as needed for sleep      Pharmacy: [facility]    Does the patient have enough for 3 days?   [] Yes   [x] No - Send as HP to POD

## 2024-03-01 NOTE — TELEPHONE ENCOUNTER
Refill must be reviewed and completed by the office or provider. The refill is unable to be approved / refused by the medication management team.

## 2024-03-03 DIAGNOSIS — T84.498S FAILED ORTHOPEDIC IMPLANT, SEQUELA: ICD-10-CM

## 2024-03-03 RX ORDER — AMLODIPINE BESYLATE 5 MG/1
5 TABLET ORAL DAILY
Qty: 90 TABLET | Refills: 0 | Status: SHIPPED | OUTPATIENT
Start: 2024-03-03

## 2024-03-15 DIAGNOSIS — M15.9 PRIMARY OSTEOARTHRITIS INVOLVING MULTIPLE JOINTS: ICD-10-CM

## 2024-03-15 RX ORDER — CELECOXIB 200 MG/1
200 CAPSULE ORAL DAILY
Qty: 30 CAPSULE | Refills: 5 | Status: SHIPPED | OUTPATIENT
Start: 2024-03-15

## 2024-03-15 NOTE — TELEPHONE ENCOUNTER
Reason for call:   [x] Refill   [] Prior Auth  [] Other:     Office:   [x] PCP/Provider -   [] Specialty/Provider -     Medication: celecoxib (CeleBREX) 200 mg capsule     Dose/Frequency: Take 1 capsule (200 mg total) by mouth daily     Quantity: 30    Pharmacy: [id number] [place], [place] -  [address] [phone]     Does the patient have enough for 3 days?   [x] Yes   [] No - Send as HP to POD

## 2024-03-29 DIAGNOSIS — T84.498S FAILED ORTHOPEDIC IMPLANT, SEQUELA: ICD-10-CM

## 2024-03-29 NOTE — TELEPHONE ENCOUNTER
Reason for call:   [x] Refill   [] Prior Auth  [] Other:     Office:   [x] PCP/Provider - Yifan Stone   [] Specialty/Provider -     Medication: Tramadol    Dose/Frequency: 50 mg Q 8 HRS PRN    Quantity: 30    Pharmacy: [facility] [place] [facility] [address]    Does the patient have enough for 3 days?   [x] Yes   [] No - Send as HP to POD

## 2024-04-01 RX ORDER — TRAMADOL HYDROCHLORIDE 50 MG/1
50 TABLET ORAL EVERY 8 HOURS PRN
Qty: 30 TABLET | Refills: 0 | Status: SHIPPED | OUTPATIENT
Start: 2024-04-01

## 2024-04-12 DIAGNOSIS — T84.498S FAILED ORTHOPEDIC IMPLANT, SEQUELA: ICD-10-CM

## 2024-04-13 RX ORDER — AMLODIPINE BESYLATE 5 MG/1
5 TABLET ORAL DAILY
Qty: 90 TABLET | Refills: 0 | Status: SHIPPED | OUTPATIENT
Start: 2024-04-13

## 2024-05-06 DIAGNOSIS — G47.9 SLEEP DISORDER: ICD-10-CM

## 2024-05-06 DIAGNOSIS — T84.498S FAILED ORTHOPEDIC IMPLANT, SEQUELA: ICD-10-CM

## 2024-05-06 NOTE — TELEPHONE ENCOUNTER
Reason for call:   [x] Refill   [] Prior Auth  [] Other:     Office:   [x] PCP/Provider -   [] Specialty/Provider -     TRAMADOL  50 MG    AMBIEN  10 MG    [id number] - [place], [place] - [address] [place] [address]  [address] [address]  Phone: [phone]  Fax: [phone]  CONNOR #: [id number]     Does the patient have enough for 3 days?   [x] Yes   [] No - Send as HP to POD

## 2024-05-07 RX ORDER — TRAMADOL HYDROCHLORIDE 50 MG/1
50 TABLET ORAL EVERY 8 HOURS PRN
Qty: 30 TABLET | Refills: 0 | Status: SHIPPED | OUTPATIENT
Start: 2024-05-07

## 2024-05-07 RX ORDER — ZOLPIDEM TARTRATE 10 MG/1
10 TABLET ORAL
Qty: 30 TABLET | Refills: 0 | Status: SHIPPED | OUTPATIENT
Start: 2024-05-07

## 2024-05-10 NOTE — TELEPHONE ENCOUNTER
Pt called to check in on his script.  I explained that his ins requires a prior auth.  It can take up 7 days for approval or denial.  It is in the process at this time

## 2024-05-14 ENCOUNTER — TELEPHONE (OUTPATIENT)
Age: 74
End: 2024-05-14

## 2024-05-14 PROBLEM — G89.29 CHRONIC MIDLINE LOW BACK PAIN WITHOUT SCIATICA: Status: ACTIVE | Noted: 2024-05-14

## 2024-05-14 PROBLEM — M54.50 CHRONIC MIDLINE LOW BACK PAIN WITHOUT SCIATICA: Status: ACTIVE | Noted: 2024-05-14

## 2024-05-14 NOTE — TELEPHONE ENCOUNTER
PA Zolpidem (AMBIEN) 10 mg tablet was submitted/faxed by office staff on 5/13/24 (PA form in media). Office staff to follow up on prior authorization.

## 2024-05-14 NOTE — PROGRESS NOTES
Assessment/Plan     Problem List Items Addressed This Visit        Musculoskeletal and Integument    Osteoarthritis    DDD (degenerative disc disease), lumbar    Relevant Orders    [facility] All Prescribed Meds and Special Instructions    Amphetamines, Methamphetamines    Butalbital    Phenobarbital    Secobarbital    Alprazolam    Clonazepam    Diazepam, Temazepam, Oxazepam    Lorazepam    Gabapentin    Pregabalin    Cocaine    Heroin    Buprenorphine    Levorphanol    Meperidine    Naltrexone    Fentanyl    Methadone    Oxycodone    Tapentadol    THC    Tramadol    Codeine, Hydrocodone, Hydropmorphone, Morphine    Bath Salts    Ethyl Glucuronide/Ethyl Sulfate    Kratom    Spice    Methylphenidate    Phentermine    Validity Oxidant    Validity Creatinine    Validity pH    Validity Specific    Xylazine Definitive Test       Behavioral Health    Continuous opioid dependence (HCC)       Surgery/Wound/Pain    Chronic midline low back pain without sciatica - Primary    Chronic pain syndrome    Relevant Orders    [facility] All Prescribed Meds and Special Instructions    Amphetamines, Methamphetamines    Butalbital    Phenobarbital    Secobarbital    Alprazolam    Clonazepam    Diazepam, Temazepam, Oxazepam    Lorazepam    Gabapentin    Pregabalin    Cocaine    Heroin    Buprenorphine    Levorphanol    Meperidine    Naltrexone    Fentanyl    Methadone    Oxycodone    Tapentadol    THC    Tramadol    Codeine, Hydrocodone, Hydropmorphone, Morphine    Bath Salts    Ethyl Glucuronide/Ethyl Sulfate    Kratom    Spice    Methylphenidate    Phentermine    Validity Oxidant    Validity Creatinine    Validity pH    Validity Specific    Xylazine Definitive Test        Treatment Plan: STRETCHING EXERCISES  PAIN MEDICATION WHEN NEEDED    Treatment Goals: IMPROVED MOBILITY  PAIN RELIEF    Opiate risks  There are risks associated with opioid medications, including dependence, addiction and tolerance. The patient understands and agrees  to use these medications only as prescribed. Potential side effects of the medications include, but are not limited to, constipation, drowsiness, addiction, impaired judgment and risk of fatal overdose if not taken as prescribed. The patient was warned against driving while taking sedation medications.  Sharing medications is a felony. At this point in time, the patient is showing no signs of addiction, abuse, diversion or suicidal ideation.      Opioid agreement  Pain management agreement was reviewed with patient and signed/updated during visit      Drug screen  Drug screen collected during today's visit      PDMP review  [place] or [place]  reviewed. No red flags were identified; safe to proceed with prescription      instructions for management and impressions.       Depression Screening and Follow-up Plan: Patient was screened for depression during today's encounter. They screened negative with a PHQ-9 score of 2.       Subjective     Opioid Management:   Type of visit: Initial    Pain related diagnoses: CHRONIC LOWER LUMBAR PAIN WITH SCIATICA    EPISODIC PAIN WITH ACTIVITY  X MANY YEARS  LOWER LUMBAR PAIN  RADIATING DOWN BOTH LEGS AT TIMES    Current pain description: MINIMAL DISCOMFORT AT THIS TIME    Functional status: FUNCTIONAL    Goals of care: IMPROVED MOBILITY  PAIN RELIEF    Social Support System  Patient receives support from their: wife and daughter    Screening Tools/Assessments:    PHQ-2/9:  PHQ-9 score: 2    Opioid Risk Tool (ORT):  Current ORT Score: 7 (moderate risk for opiate abuse)    SOAPP:  Current SOAPP Score: 6 (negative, low risk patient)    Brief Pain Inventory (BPI):  1) Throughout our lives, most of us have had pain from time to time (such as minor headaches, sprains, and toothaches). Have you had pain other than these everyday kinds of pain today? Yes  2) Where is your pain located? knees, back  3) Rate your pain at its worst in the last 24 hours: 7  4) Rate your pain at its least in  the last 24 hours: 3  5) Rate your average level of pain: 5  6) Rate your pain right now: 5  7) What treatments or medications are you receiving for your pain? physical therapy, one (1) tramadol tablet daily in AM, Celebrex at bedtime when pain levels at 7 or greater. NEVER both at the same time.  8) In the past 24 hours, how much relief have pain treatments or medication provided? 40%  9) During the past 24 hours, pain has interfered with your:     A) General activity: 7     B) Mood: 2     C) Walking ability: 5     D) Normal work (work outside the home & housework): 7     E) Relations with other people: 1     F) Sleep: 5     G) Enjoyment of life: 0    Drug Screen:  Date of last drug screen: 5/15/2024    Opioid agreement:  Active Opioid agreement on file?: No      Naloxone:  Currently prescribed Naloxone (Narcan): No    Reason not prescribing Naloxone: other (comment) - WILL DISCUSS    Other treatments tried/failed:   Rest, heat, naproxen (OTC) and home exercises    OPIOID MANAGEMENT      Pain Medications             Aspirin 325 MG CAPS Take by mouth    celecoxib (CeleBREX) 200 mg capsule Take 1 capsule (200 mg total) by mouth daily    traMADol (ULTRAM) 50 mg tablet Take 1 tablet (50 mg total) by mouth every 8 (eight) hours as needed for moderate pain         Outpatient Morphine Milligram Equivalents Per Day     5/15/24 and after 15 MME/Day    Order Name Dose Route Frequency Maximum MME/Day     traMADol (ULTRAM) 50 mg tablet 50 mg Oral Every 8 hours PRN 15 MME/Day    Total Potential Morphine Milligram Equivalents Per Day 15 MME/Day    Calculation Information        traMADol (ULTRAM) 50 mg tablet    traMADol 50 mg Tabs: single dose of 50 mg * 3 doses per day * morphine equivalence factor of 0.1 = 15 MME/Day                         PDMP Review       Value Time User    PDMP Reviewed  Yes 5/7/2024  9:01 AM Yifan Stone MD         Review of Systems   Constitutional:  Negative for chills, fatigue and fever.   HENT:  " Negative for congestion, ear discharge, ear pain, mouth sores, postnasal drip, sore throat and trouble swallowing.    Eyes:  Negative for pain, discharge and visual disturbance.   Respiratory:  Negative for cough, shortness of breath and wheezing.    Cardiovascular:  Negative for chest pain, palpitations and leg swelling.   Gastrointestinal:  Negative for abdominal distention, abdominal pain, blood in stool, diarrhea and nausea.   Endocrine: Negative for polydipsia, polyphagia and polyuria.   Genitourinary:  Negative for dysuria, frequency, hematuria and urgency.   Musculoskeletal:  Positive for arthralgias, back pain, neck pain and neck stiffness. Negative for gait problem and joint swelling.   Skin:  Negative for pallor and rash.   Neurological:  Negative for dizziness, syncope, speech difficulty, weakness, light-headedness, numbness and headaches.   Hematological:  Negative for adenopathy.   Psychiatric/Behavioral:  Negative for behavioral problems, confusion and sleep disturbance. The patient is not nervous/anxious.      Objective     /92 (BP Location: Left arm, Patient Position: Sitting, Cuff Size: Large)   Pulse 89   Temp (!) 96.6 °F (35.9 °C) (Temporal)   Resp 14   Ht 5' 6.5\" (1.689 m)   Wt 68.9 kg (152 lb)   SpO2 99%   BMI 24.17 kg/m²     Physical Exam  Constitutional:       General: He is not in acute distress.     Appearance: Normal appearance. He is well-developed and normal weight. He is not ill-appearing or diaphoretic.   HENT:      Head: Normocephalic and atraumatic.      Right Ear: Tympanic membrane and external ear normal.      Left Ear: Tympanic membrane and external ear normal.      Nose: Nose normal.      Mouth/Throat:      Mouth: Mucous membranes are moist.      Pharynx: No oropharyngeal exudate.   Eyes:      General: No scleral icterus.        Right eye: No discharge.         Left eye: No discharge.      Conjunctiva/sclera: Conjunctivae normal.      Pupils: Pupils are equal, round, " and reactive to light.   Neck:      Thyroid: No thyromegaly.      Vascular: No JVD.      Trachea: No tracheal deviation.   Cardiovascular:      Rate and Rhythm: Normal rate and regular rhythm.      Heart sounds: Normal heart sounds. No murmur heard.     No friction rub. No gallop.   Pulmonary:      Effort: Pulmonary effort is normal. No respiratory distress.      Breath sounds: Normal breath sounds. No stridor. No wheezing or rales.   Chest:      Chest wall: No tenderness.   Abdominal:      General: Bowel sounds are normal. There is no distension.      Palpations: Abdomen is soft. There is no mass.      Tenderness: There is no abdominal tenderness. There is no guarding or rebound.      Hernia: No hernia is present.   Genitourinary:     Penis: Normal. No tenderness.       Testes: Normal.      Prostate: Normal.      Rectum: Normal. Guaiac result negative.   Musculoskeletal:         General: No tenderness or deformity.      Cervical back: Normal range of motion and neck supple.      Comments: MODERATE DJD CHANGES   Lymphadenopathy:      Cervical: No cervical adenopathy.   Skin:     General: Skin is warm and dry.      Coloration: Skin is not pale.      Findings: No erythema or rash.   Neurological:      General: No focal deficit present.      Mental Status: He is alert and oriented to person, place, and time.      Cranial Nerves: No cranial nerve deficit.      Sensory: No sensory deficit.      Motor: No weakness or abnormal muscle tone.      Coordination: Coordination normal.      Gait: Gait normal.      Deep Tendon Reflexes: Reflexes normal.   Psychiatric:         Mood and Affect: Mood normal.         Behavior: Behavior normal.         Thought Content: Thought content normal.         Judgment: Judgment normal.         Yifan Stone MD

## 2024-05-14 NOTE — PATIENT INSTRUCTIONS
CONTINUE CURRENT TREATMENT PLAN  MEDICATION AS DIRECTED  CONSIDER PT / PAIN MANAGEMENT    RV 3M  Goals of care:  Maximize your health and quality of life by:   Increasing your level of function and activity  Decreasing the negative effects of pain on your life  Minimizing the risks and side effects of medications and ensuring safe use of opioid medication     Ways for you to help meet your goals:  Maintain a healthy lifestyle. This includes proper nutrition, regular physical activity as able, try for 8 hours of sleep per night, use stress reduction strategies, avoid triggers.      Risks and side effects of opioid use:  Prescription opioids carry serious risks of addiction and  overdose, especially with prolonged use. An opioid overdose,  often marked by slowed breathing, can cause sudden death. The  use of prescription opioids can have a number of side effects as  well, even when taken as directed:  Tolerance--meaning you might need to take more of a medication for the same pain relief  Physical dependence--meaning you have symptoms of withdrawal when a medication is stopped  Increased sensitivity to pain  Constipation  Nausea, vomiting, dry mouth  Sleepiness and dizziness   Confusion  Depression  Low levels of testosterone that can result in lower sex drive, energy, and strength  Itching and sweating    If you are prescribed opioids for pain:  Never take opioids in greater amounts or more often than prescribed.  Help prevent misuse and abuse.        - Never sell or share prescription opioids.        - Never use another person’s prescription opioids.  ‡Store prescription opioids in a secure place and out of reach of others (this may include visitors, children, friends, and family).  Safely dispose of unused prescription opioids: Find your community drug take-back program or your pharmacy mail-back program, or flush them down the toilet, following guidance from the Food and Drug Administration  (www.fda.gov/Drugs/ResourcesForYou).  ‡Visit www.cdc.gov/drugoverdose to learn about the risks of opioid abuse and overdose.  If you believe you may be struggling with addiction, tell your health care provider and ask for guidance or call [facility]’s National Helpline at 4-994-702-LWUM.

## 2024-05-15 ENCOUNTER — OFFICE VISIT (OUTPATIENT)
Dept: FAMILY MEDICINE CLINIC | Facility: CLINIC | Age: 74
End: 2024-05-15
Payer: MEDICARE

## 2024-05-15 VITALS
SYSTOLIC BLOOD PRESSURE: 158 MMHG | OXYGEN SATURATION: 99 % | HEART RATE: 89 BPM | DIASTOLIC BLOOD PRESSURE: 92 MMHG | TEMPERATURE: 96.6 F | WEIGHT: 152 LBS | HEIGHT: 67 IN | BODY MASS INDEX: 23.86 KG/M2 | RESPIRATION RATE: 14 BRPM

## 2024-05-15 DIAGNOSIS — M51.36 DDD (DEGENERATIVE DISC DISEASE), LUMBAR: ICD-10-CM

## 2024-05-15 DIAGNOSIS — M15.9 PRIMARY OSTEOARTHRITIS INVOLVING MULTIPLE JOINTS: ICD-10-CM

## 2024-05-15 DIAGNOSIS — M54.50 CHRONIC MIDLINE LOW BACK PAIN WITHOUT SCIATICA: Primary | ICD-10-CM

## 2024-05-15 DIAGNOSIS — G89.29 CHRONIC MIDLINE LOW BACK PAIN WITHOUT SCIATICA: Primary | ICD-10-CM

## 2024-05-15 DIAGNOSIS — G89.4 CHRONIC PAIN SYNDROME: ICD-10-CM

## 2024-05-15 DIAGNOSIS — F11.20 CONTINUOUS OPIOID DEPENDENCE (HCC): ICD-10-CM

## 2024-05-15 PROCEDURE — G2211 COMPLEX E/M VISIT ADD ON: HCPCS | Performed by: FAMILY MEDICINE

## 2024-05-15 PROCEDURE — 99214 OFFICE O/P EST MOD 30 MIN: CPT | Performed by: FAMILY MEDICINE

## 2024-05-16 DIAGNOSIS — E78.01 FAMILIAL HYPERCHOLESTEROLEMIA: ICD-10-CM

## 2024-05-16 RX ORDER — ATORVASTATIN CALCIUM 20 MG/1
TABLET, FILM COATED ORAL
Qty: 90 TABLET | Refills: 1 | Status: SHIPPED | OUTPATIENT
Start: 2024-05-16

## 2024-05-16 NOTE — TELEPHONE ENCOUNTER
Patient called requesting refill for alfuzosin (UROXATRAL) 10 mg. Patient made aware medication was refilled on 2/14/24 for 90 with 1 refills to [facility] pharmacy. Patient instructed to contact the pharmacy to obtain refills of medication. Patient verbalized understanding.

## 2024-05-17 LAB
4OH-XYLAZINE UR QL CFM: NEGATIVE NG/ML
6MAM UR QL CFM: NEGATIVE NG/ML
7AMINOCLONAZEPAM UR QL CFM: NEGATIVE NG/ML
A-OH ALPRAZ UR QL CFM: NEGATIVE NG/ML
ACCEPTABLE CREAT UR QL: ABNORMAL MG/DL
ACCEPTIBLE SP GR UR QL: ABNORMAL
AMPHET UR QL CFM: NEGATIVE NG/ML
BUPRENORPHINE UR QL CFM: NEGATIVE NG/ML
BUTALBITAL UR QL CFM: NEGATIVE NG/ML
BZE UR QL CFM: NEGATIVE NG/ML
CODEINE UR QL CFM: NEGATIVE NG/ML
EDDP UR QL CFM: NEGATIVE NG/ML
ETHYL GLUCURONIDE UR QL CFM: NEGATIVE NG/ML
ETHYL SULFATE UR QL SCN: NEGATIVE NG/ML
EUTYLONE UR QL: NEGATIVE NG/ML
FENTANYL UR QL CFM: NEGATIVE NG/ML
GLIADIN IGG SER IA-ACNC: NEGATIVE NG/ML
HYDROCODONE UR QL CFM: NEGATIVE NG/ML
HYDROMORPHONE UR QL CFM: NEGATIVE NG/ML
LORAZEPAM UR QL CFM: NEGATIVE NG/ML
ME-PHENIDATE UR QL CFM: NEGATIVE NG/ML
MEPERIDINE UR QL CFM: NEGATIVE NG/ML
METHADONE UR QL CFM: NEGATIVE NG/ML
METHAMPHET UR QL CFM: NEGATIVE NG/ML
MORPHINE UR QL CFM: NEGATIVE NG/ML
NALTREXONE UR QL CFM: NEGATIVE NG/ML
NITRITE UR QL: NORMAL UG/ML
NORBUPRENORPHINE UR QL CFM: NEGATIVE NG/ML
NORDIAZEPAM UR QL CFM: NEGATIVE NG/ML
NORFENTANYL UR QL CFM: NEGATIVE NG/ML
NORHYDROCODONE UR QL CFM: NEGATIVE NG/ML
NORMEPERIDINE UR QL CFM: NEGATIVE NG/ML
NOROXYCODONE UR QL CFM: NEGATIVE NG/ML
OXAZEPAM UR QL CFM: NEGATIVE NG/ML
OXYCODONE UR QL CFM: NEGATIVE NG/ML
OXYMORPHONE UR QL CFM: NEGATIVE NG/ML
PARA-FLUOROFENTANYL QUANTIFICATION: NORMAL NG/ML
PHENOBARB UR QL CFM: NEGATIVE NG/ML
RESULT ALL_PRESCRIBED MEDS AND SPECIAL INSTRUCTIONS: NORMAL
SECOBARBITAL UR QL CFM: NEGATIVE NG/ML
SL AMB 4-ANPP QUANTIFICATION: NORMAL NG/ML
SL AMB 5F-ADB-M7 METABOLITE QUANTIFICATION: NEGATIVE NG/ML
SL AMB 7-OH-MITRAGYNINE (KRATOM ALKALOID) QUANTIFICATION: NEGATIVE NG/ML
SL AMB AB-FUBINACA-M3 METABOLITE QUANTIFICATION: NEGATIVE NG/ML
SL AMB ACETYL FENTANYL QUANTIFICATION: NORMAL NG/ML
SL AMB ACETYL NORFENTANYL QUANTIFICATION: NORMAL NG/ML
SL AMB ACRYL FENTANYL QUANTIFICATION: NORMAL NG/ML
SL AMB CARFENTANIL QUANTIFICATION: NORMAL NG/ML
SL AMB CTHC (MARIJUANA METABOLITE) QUANTIFICATION: NEGATIVE NG/ML
SL AMB DEXTRORPHAN (DEXTROMETHORPHAN METABOLITE) QUANT: NEGATIVE NG/ML
SL AMB GABAPENTIN QUANTIFICATION: NEGATIVE NG/ML
SL AMB JWH018 METABOLITE QUANTIFICATION: NEGATIVE NG/ML
SL AMB JWH073 METABOLITE QUANTIFICATION: NEGATIVE NG/ML
SL AMB MDMB-FUBINACA-M1 METABOLITE QUANTIFICATION: NEGATIVE NG/ML
SL AMB METHYLONE QUANTIFICATION: NEGATIVE NG/ML
SL AMB N-DESMETHYL-TRAMADOL QUANTIFICATION: NORMAL NG/ML
SL AMB PHENTERMINE QUANTIFICATION: NEGATIVE NG/ML
SL AMB PREGABALIN QUANTIFICATION: NEGATIVE NG/ML
SL AMB RCS4 METABOLITE QUANTIFICATION: NEGATIVE NG/ML
SL AMB RITALINIC ACID QUANTIFICATION: NEGATIVE NG/ML
SMOOTH MUSCLE AB TITR SER IF: NEGATIVE NG/ML
SPECIMEN DRAWN SERPL: NEGATIVE NG/ML
SPECIMEN PH ACCEPTABLE UR: NORMAL
TAPENTADOL UR QL CFM: NEGATIVE NG/ML
TEMAZEPAM UR QL CFM: NEGATIVE NG/ML
TRAMADOL UR QL CFM: NORMAL NG/ML
URATE/CREAT 24H UR: NORMAL NG/ML
XYLAZINE UR QL CFM: NEGATIVE NG/ML

## 2024-05-23 NOTE — TELEPHONE ENCOUNTER
[facility] called regarding Juan's zolpidem    They sent a form to be completed, which I believe is in your office.     They just need a verbal that you are ok with him taking this rx due to his age being 74        Clerical:  Call [phone]  Ref # [id number]

## 2024-05-23 NOTE — TELEPHONE ENCOUNTER
Spoke to Demarco at [facility] - Call Reference # [id number]    Member ID#  [id number]    Appeal has been overturned.  Approve from 5/16/24 to further notice.    Approval number: [id number]

## 2024-05-24 DIAGNOSIS — N40.0 PROSTATIC HYPERTROPHY: ICD-10-CM

## 2024-05-24 RX ORDER — FINASTERIDE 5 MG/1
TABLET, FILM COATED ORAL
Qty: 90 TABLET | Refills: 1 | Status: SHIPPED | OUTPATIENT
Start: 2024-05-24

## 2024-06-06 DIAGNOSIS — T84.498S FAILED ORTHOPEDIC IMPLANT, SEQUELA: ICD-10-CM

## 2024-06-06 RX ORDER — TRAMADOL HYDROCHLORIDE 50 MG/1
50 TABLET ORAL EVERY 8 HOURS PRN
Qty: 30 TABLET | Refills: 0 | Status: SHIPPED | OUTPATIENT
Start: 2024-06-06

## 2024-06-06 NOTE — TELEPHONE ENCOUNTER
Reason for call:   [x] Refill   [] Prior Auth  [] Other:     Office:   [x] PCP/Provider - : Yifan Stone MD   [] Specialty/Provider -     Medication: traMADol (ULTRAM) 50 mg tablet     Dose/Frequency: Take 1 tablet (50 mg total) by mouth every 8 (eight) hours as needed for moderate pain     Quantity: 30    Pharmacy: [facility] #[id number]    Does the patient have enough for 3 days?   [] Yes   [x] No - Send as HP to POD

## 2024-06-14 ENCOUNTER — NURSE TRIAGE (OUTPATIENT)
Age: 74
End: 2024-06-14

## 2024-06-14 NOTE — TELEPHONE ENCOUNTER
Regarding: heart palpitation along with being light headed.  ----- Message from Stacey NAZARIO sent at 6/14/2024 12:01 PM EDT -----  Patient is having heart palpitation along with being light headed. Does not think he needs to go to ER but patient stated that  cardiologist said that he would need to be evaled

## 2024-06-14 NOTE — TELEPHONE ENCOUNTER
Patient called in and left message that he was having palpitations.  3 attempts to call patient back.  3 messages left with info for call back.      Reason for Disposition   Third attempt to contact caller AND no contact made. Phone number verified.    Answer Assessment - Initial Assessment Questions  N/A  N/A    Protocols used: No Contact or Duplicate Contact Call-ADULT-[place]

## 2024-06-24 ENCOUNTER — OFFICE VISIT (OUTPATIENT)
Dept: FAMILY MEDICINE CLINIC | Facility: CLINIC | Age: 74
End: 2024-06-24
Payer: MEDICARE

## 2024-06-24 VITALS
RESPIRATION RATE: 16 BRPM | SYSTOLIC BLOOD PRESSURE: 128 MMHG | WEIGHT: 149.66 LBS | BODY MASS INDEX: 23.49 KG/M2 | DIASTOLIC BLOOD PRESSURE: 80 MMHG | OXYGEN SATURATION: 97 % | HEART RATE: 84 BPM | TEMPERATURE: 97.3 F | HEIGHT: 67 IN

## 2024-06-24 DIAGNOSIS — G47.33 OBSTRUCTIVE SLEEP APNEA SYNDROME: ICD-10-CM

## 2024-06-24 DIAGNOSIS — R06.02 SHORTNESS OF BREATH: ICD-10-CM

## 2024-06-24 DIAGNOSIS — R00.2 PALPITATIONS: Primary | ICD-10-CM

## 2024-06-24 PROCEDURE — 99213 OFFICE O/P EST LOW 20 MIN: CPT | Performed by: STUDENT IN AN ORGANIZED HEALTH CARE EDUCATION/TRAINING PROGRAM

## 2024-06-24 NOTE — PROGRESS NOTES
"Ambulatory Visit  Name: Juan Vargas      : 1950      MRN: [id number]  Encounter Provider: Evy Rebolledo MD  Encounter Date: 2024   Encounter department: [facility]    Assessment & Plan   1. Palpitations  -     CBC and differential; Future  -     TSH, 3rd generation; Future  -     CBC and differential  -     TSH, 3rd generation  -     POCT ECG  2. Shortness of breath  -     Complete PFT with post bronchodilator; Future  -     POCT ECG  3. Obstructive sleep apnea syndrome  Assessment & Plan:  -Advised patient to restart using CPAP     History of Present Illness     HPI    Patient presents with complaint of SOB and palpitations that started two weeks ago. He notes this has  overall improved. He has not been using his CPAP machine for two weeks. He has a history of SILVA. He denies dizziness today.       Review of Systems   Constitutional:  Negative for activity change, appetite change, chills, fatigue and fever.   HENT:  Negative for congestion.    Respiratory:  Positive for shortness of breath. Negative for cough and wheezing.    Cardiovascular:  Positive for palpitations. Negative for chest pain and leg swelling.   Gastrointestinal:  Negative for abdominal pain, constipation, diarrhea, nausea and vomiting.   Skin:  Negative for rash.   Neurological:  Negative for light-headedness and headaches.   Psychiatric/Behavioral:  The patient is not nervous/anxious.        Objective     /80   Pulse 84   Temp (!) 97.3 °F (36.3 °C) (Temporal)   Resp 16   Ht 5' 6.5\" (1.689 m)   Wt 67.9 kg (149 lb 10.6 oz)   SpO2 97%   BMI 23.79 kg/m²     Physical Exam  Constitutional:       Appearance: Normal appearance.   HENT:      Head: Normocephalic and atraumatic.   Cardiovascular:      Rate and Rhythm: Normal rate and regular rhythm.      Pulses: Normal pulses.      Heart sounds: Normal heart sounds.   Pulmonary:      Effort: Pulmonary effort is normal.      Breath sounds: Normal breath " sounds.   Neurological:      General: No focal deficit present.      Mental Status: He is alert and oriented to person, place, and time.   Psychiatric:         Mood and Affect: Mood normal.         Behavior: Behavior normal.         Thought Content: Thought content normal.         Judgment: Judgment normal.       Administrative Statements

## 2024-06-25 PROCEDURE — 93000 ELECTROCARDIOGRAM COMPLETE: CPT | Performed by: STUDENT IN AN ORGANIZED HEALTH CARE EDUCATION/TRAINING PROGRAM

## 2024-07-05 DIAGNOSIS — G47.9 SLEEP DISORDER: ICD-10-CM

## 2024-07-05 DIAGNOSIS — T84.498S FAILED ORTHOPEDIC IMPLANT, SEQUELA: ICD-10-CM

## 2024-07-05 NOTE — TELEPHONE ENCOUNTER
Reason for call:   [x] Refill   [] Prior Auth  [] Other:     Office:   [x] PCP/Provider -   [] Specialty/Provider -     Medication: traMADol (ULTRAM) 50 mg tablet     Dose/Frequency: Take 1 tablet (50 mg total) by mouth every 8 (eight) hours as needed for moderate pain     Quantity: 30 tablet     Pharmacy: [facility][id number] [address] [facility]     Does the patient have enough for 3 days?   [x] Yes   [] No - Send as HP to POD

## 2024-07-08 RX ORDER — TRAMADOL HYDROCHLORIDE 50 MG/1
50 TABLET ORAL EVERY 8 HOURS PRN
Qty: 30 TABLET | Refills: 0 | Status: SHIPPED | OUTPATIENT
Start: 2024-07-08

## 2024-07-09 LAB
BASOPHILS # BLD AUTO: 68 CELLS/UL (ref 0–200)
BASOPHILS NFR BLD AUTO: 1.5 %
EOSINOPHIL # BLD AUTO: 396 CELLS/UL (ref 15–500)
EOSINOPHIL NFR BLD AUTO: 8.8 %
ERYTHROCYTE [DISTWIDTH] IN BLOOD BY AUTOMATED COUNT: 13.6 % (ref 11–15)
HCT VFR BLD AUTO: 44.1 % (ref 38.5–50)
HGB BLD-MCNC: 14.7 G/DL (ref 13.2–17.1)
LYMPHOCYTES # BLD AUTO: 1157 CELLS/UL (ref 850–3900)
LYMPHOCYTES NFR BLD AUTO: 25.7 %
MCH RBC QN AUTO: 29.2 PG (ref 27–33)
MCHC RBC AUTO-ENTMCNC: 33.3 G/DL (ref 32–36)
MCV RBC AUTO: 87.5 FL (ref 80–100)
MONOCYTES # BLD AUTO: 311 CELLS/UL (ref 200–950)
MONOCYTES NFR BLD AUTO: 6.9 %
NEUTROPHILS # BLD AUTO: 2570 CELLS/UL (ref 1500–7800)
NEUTROPHILS NFR BLD AUTO: 57.1 %
PLATELET # BLD AUTO: 164 THOUSAND/UL (ref 140–400)
PMV BLD REES-ECKER: 11.4 FL (ref 7.5–12.5)
RBC # BLD AUTO: 5.04 MILLION/UL (ref 4.2–5.8)
TSH SERPL-ACNC: 1.31 MIU/L (ref 0.4–4.5)
WBC # BLD AUTO: 4.5 THOUSAND/UL (ref 3.8–10.8)

## 2024-07-19 DIAGNOSIS — Z00.6 ENCOUNTER FOR EXAMINATION FOR NORMAL COMPARISON OR CONTROL IN CLINICAL RESEARCH PROGRAM: ICD-10-CM

## 2024-08-01 ENCOUNTER — HOSPITAL ENCOUNTER (OUTPATIENT)
Dept: PULMONOLOGY | Facility: HOSPITAL | Age: 74
End: 2024-08-01
Attending: STUDENT IN AN ORGANIZED HEALTH CARE EDUCATION/TRAINING PROGRAM
Payer: MEDICARE

## 2024-08-01 DIAGNOSIS — T84.498S FAILED ORTHOPEDIC IMPLANT, SEQUELA: ICD-10-CM

## 2024-08-01 DIAGNOSIS — R06.02 SHORTNESS OF BREATH: ICD-10-CM

## 2024-08-01 DIAGNOSIS — G47.9 SLEEP DISORDER: ICD-10-CM

## 2024-08-01 PROCEDURE — 94060 EVALUATION OF WHEEZING: CPT | Performed by: INTERNAL MEDICINE

## 2024-08-01 PROCEDURE — 94060 EVALUATION OF WHEEZING: CPT

## 2024-08-01 PROCEDURE — 94760 N-INVAS EAR/PLS OXIMETRY 1: CPT

## 2024-08-01 PROCEDURE — 94726 PLETHYSMOGRAPHY LUNG VOLUMES: CPT | Performed by: INTERNAL MEDICINE

## 2024-08-01 PROCEDURE — 94729 DIFFUSING CAPACITY: CPT

## 2024-08-01 PROCEDURE — 94729 DIFFUSING CAPACITY: CPT | Performed by: INTERNAL MEDICINE

## 2024-08-01 PROCEDURE — 94726 PLETHYSMOGRAPHY LUNG VOLUMES: CPT

## 2024-08-01 RX ORDER — ZOLPIDEM TARTRATE 10 MG/1
10 TABLET ORAL
Qty: 30 TABLET | Refills: 0 | Status: SHIPPED | OUTPATIENT
Start: 2024-08-01

## 2024-08-01 RX ORDER — TRAMADOL HYDROCHLORIDE 50 MG/1
50 TABLET ORAL EVERY 8 HOURS PRN
Qty: 30 TABLET | Refills: 0 | Status: CANCELLED | OUTPATIENT
Start: 2024-08-01

## 2024-08-01 RX ORDER — ALBUTEROL SULFATE 2.5 MG/3ML
2.5 SOLUTION RESPIRATORY (INHALATION) ONCE
Status: COMPLETED | OUTPATIENT
Start: 2024-08-01 | End: 2024-08-01

## 2024-08-01 RX ADMIN — ALBUTEROL SULFATE 2.5 MG: 2.5 SOLUTION RESPIRATORY (INHALATION) at 09:52

## 2024-08-01 NOTE — TELEPHONE ENCOUNTER
Reason for call:   [x] Refill   [] Prior Auth  [] Other:     Office:   [x] PCP/Provider -   [] Specialty/Provider -     Medication:         Pharmacy: [facility]    Does the patient have enough for 3 days?   [x] Yes   [] No - Send as HP to POD

## 2024-08-05 NOTE — TELEPHONE ENCOUNTER
Patient called to check status of his Tramadol prescription only has 3 left. Can a prescription be sent and be placed on hold.

## 2024-08-13 DIAGNOSIS — T84.498S FAILED ORTHOPEDIC IMPLANT, SEQUELA: ICD-10-CM

## 2024-08-13 RX ORDER — TRAMADOL HYDROCHLORIDE 50 MG/1
50 TABLET ORAL EVERY 8 HOURS PRN
Qty: 30 TABLET | Refills: 0 | Status: SHIPPED | OUTPATIENT
Start: 2024-08-13

## 2024-08-13 NOTE — TELEPHONE ENCOUNTER
Reason for call:   [x] Refill   [] Prior Auth  [] Other:     Office:   [x] PCP/Provider -   [] Specialty/Provider -     Medication: Tramadol     Dose/Frequency: 50 mg tablet taken by mouth every 8 hours as needed for moderate pain     Quantity: 30    Pharmacy: [facility] #[id number] [place] [address] [phone]     Does the patient have enough for 3 days?   [] Yes   [x] No - Send as HP to POD

## 2024-08-16 NOTE — PATIENT INSTRUCTIONS
CONTINUE CURRENT TREATMENT PLAN  MEDICATION AS DIRECTED  CONSIDER PT / PAIN MANAGEMENT    RV 3M  Goals of care:  Maximize your health and quality of life by:   Increasing your level of function and activity  Decreasing the negative effects of pain on your life  Minimizing the risks and side effects of medications and ensuring safe use of opioid medication     Ways for you to help meet your goals:  Maintain a healthy lifestyle. This includes proper nutrition, regular physical activity as able, try for 8 hours of sleep per night, use stress reduction strategies, avoid triggers.      Risks and side effects of opioid use:  Prescription opioids carry serious risks of addiction and  overdose, especially with prolonged use. An opioid overdose,  often marked by slowed breathing, can cause sudden death. The  use of prescription opioids can have a number of side effects as  well, even when taken as directed:  Tolerance--meaning you might need to take more of a medication for the same pain relief  Physical dependence--meaning you have symptoms of withdrawal when a medication is stopped  Increased sensitivity to pain  Constipation  Nausea, vomiting, dry mouth  Sleepiness and dizziness   Confusion  Depression  Low levels of testosterone that can result in lower sex drive, energy, and strength  Itching and sweating    If you are prescribed opioids for pain:  Never take opioids in greater amounts or more often than prescribed.  Help prevent misuse and abuse.        - Never sell or share prescription opioids.        - Never use another person’s prescription opioids.  ‡Store prescription opioids in a secure place and out of reach of others (this may include visitors, children, friends, and family).  Safely dispose of unused prescription opioids: Find your community drug take-back program or your pharmacy mail-back program, or flush them down the toilet, following guidance from the Food and Drug Administration  (www.fda.gov/Drugs/ResourcesForYou).  ‡Visit www.cdc.gov/drugoverdose to learn about the risks of opioid abuse and overdose.  If you believe you may be struggling with addiction, tell your health care provider and ask for guidance or call [facility]’s National Helpline at 8-169-870-MPPI.

## 2024-08-16 NOTE — PROGRESS NOTES
Ambulatory Visit  Name: Juan Vargas      : 1950      MRN: [id number]  Encounter Provider: Yifan Stone MD  Encounter Date: 2024   Encounter department: [facility]    Assessment & Plan   1. Chronic midline low back pain without sciatica  2. Chronic pain syndrome  3. Obstructive sleep apnea syndrome  4. Primary osteoarthritis involving multiple joints  5. Mixed hyperlipidemia    Treatment Plan: CONTINUE STRETCHING EXERCISES  PAIN MEDICATION MANAGEMENT    Treatment Goals: IMPROVED MOBILITY  PAIN RELIEF    Opiate risks  There are risks associated with opioid medications, including dependence, addiction and tolerance. The patient understands and agrees to use these medications only as prescribed. Potential side effects of the medications include, but are not limited to, constipation, drowsiness, addiction, impaired judgment and risk of fatal overdose if not taken as prescribed. The patient was warned against driving while taking sedation medications.  Sharing medications is a felony. At this point in time, the patient is showing no signs of addiction, abuse, diversion or suicidal ideation.      PDMP review  [place] or [place]  reviewed. No red flags were identified; safe to proceed with prescription      instructions for management and impressions.           History of Present Illness     Opioid Management:   Type of visit: Follow-up    Pain related diagnoses: CHRONIC LOWER LUMBAR PAIN WITH SCIATICA    Interval history: STABLE  NO CHANGES  TOLERATING TREATMENT WELL    Aberrant behavior?: No      Screening Tools/Assessments:    PHQ-2/9:  Last PHQ-9 score: 2 (Last PHQ-9 date: 5/15/2024)      Drug Screen:  Date of last drug screen: 2024    Opioid agreement:  Active Opioid agreement on file?: Yes    Opioid agreement signed date: 5/15/2024  Opioid agreement expiration date: 5/15/2025    Naloxone:  Currently prescribed Naloxone (Narcan): No      Outpatient Morphine Milligram  Patient has upcomming appointment 07/09, requesting for lab results.     Component      Latest Ref Rng & Units 5/28/2021          12:47 PM   Albumin %      51.0 - 67.0 %    Albumin       3.2 - 4.7 g/dL    Alpha 1 %      2.0 - 4.0 %    Alpha 1      0.1 - 0.3 g/dL    Alpha 2 %      5.0 - 13.0 %    Alpha 2      0.4 - 0.9 g/dL    % Beta      10.0 - 17.0 %    Beta      0.7 - 1.2 g/dL    Gamma Globulin %      9.0 - 20.0 %    Gamma Globulin      0.6 - 1.4 g/dL    ELP Comment          Protein, Total      6.0 - 8.0 g/dL 7.0   Path ICD:          Interpreted By:          Bilirubin, Total      0.0 - 1.0 mg/dL 0.6   Bilirubin, Direct      <=0.5 mg/dL 0.2   ALBUMIN      3.5 - 5.0 g/dL 4.0   Alkaline Phosphatase      45 - 120 U/L 74   AST      0 - 40 U/L 25   ALT      0 - 45 U/L 33   Immunofixation Electrophoresis, Serum          Vitamin B-12      213 - 816 pg/mL >2,000 (H)   Vitamin B1, Whole Blood      70 - 180 nmol/L 176   MMA Serum/Plasma, Vitamin B12 Status      0.00 - 0.40 umol/L 0.16   Lyme Antibody Cascade      <0.90 Index Value 0.06   CK, Total      30 - 190 U/L 101   DANIEL Screen [place]      <=2.9 U 0.9     Component      Latest Ref Rng & Units 5/28/2021          12:47 PM   Albumin %      51.0 - 67.0 % 66.3   Albumin       3.2 - 4.7 g/dL 4.4   Alpha 1 %      2.0 - 4.0 % 2.8   Alpha 1      0.1 - 0.3 g/dL 0.2   Alpha 2 %      5.0 - 13.0 % 10.1   Alpha 2      0.4 - 0.9 g/dL 0.7   % Beta      10.0 - 17.0 % 10.2   Beta      0.7 - 1.2 g/dL 0.7   Gamma Globulin %      9.0 - 20.0 % 10.6   Gamma Globulin      0.6 - 1.4 g/dL 0.7   ELP Comment       Normal serum protein electrophoresis.   Protein, Total      6.0 - 8.0 g/dL 6.6   Path ICD:       G62.9   Interpreted By:       Aramis Yarbrough MD   Bilirubin, Total      0.0 - 1.0 mg/dL    Bilirubin, Direct      <=0.5 mg/dL    ALBUMIN      3.5 - 5.0 g/dL    Alkaline Phosphatase      45 - 120 U/L    AST      0 - 40 U/L    ALT      0 - 45 U/L    Immunofixation Electrophoresis, Serum           Vitamin B-12      213 - 816 pg/mL    Vitamin B1, Whole Blood      70 - 180 nmol/L    MMA Serum/Plasma, Vitamin B12 Status      0.00 - 0.40 umol/L    Lyme Antibody Cascade      <0.90 Index Value    CK, Total      30 - 190 U/L    DANIEL Screen [place]      <=2.9 U      Component      Latest Ref Rng & Units 5/28/2021          12:51 PM   Albumin %      51.0 - 67.0 %    Albumin       3.2 - 4.7 g/dL    Alpha 1 %      2.0 - 4.0 %    Alpha 1      0.1 - 0.3 g/dL    Alpha 2 %      5.0 - 13.0 %    Alpha 2      0.4 - 0.9 g/dL    % Beta      10.0 - 17.0 %    Beta      0.7 - 1.2 g/dL    Gamma Globulin %      9.0 - 20.0 %    Gamma Globulin      0.6 - 1.4 g/dL    ELP Comment          Protein, Total      6.0 - 8.0 g/dL    Path ICD:       G62.9   Interpreted By:       Aramis Yarbrough MD   Bilirubin, Total      0.0 - 1.0 mg/dL    Bilirubin, Direct      <=0.5 mg/dL    ALBUMIN      3.5 - 5.0 g/dL    Alkaline Phosphatase      45 - 120 U/L    AST      0 - 40 U/L    ALT      0 - 45 U/L    Immunofixation Electrophoresis, Serum       No monoclonal component identified.   Vitamin B-12      213 - 816 pg/mL    Vitamin B1, Whole Blood      70 - 180 nmol/L    MMA Serum/Plasma, Vitamin B12 Status      0.00 - 0.40 umol/L    Lyme Antibody Cascade      <0.90 Index Value    CK, Total      30 - 190 U/L    DANIEL Screen [place]      <=2.9 U       "Equivalents Per Day     8/19/24 and after 15 MME/Day    Order Name Dose Route Frequency Maximum MME/Day     traMADol (ULTRAM) 50 mg tablet 50 mg Oral Every 8 hours PRN 15 MME/Day    Total Potential Morphine Milligram Equivalents Per Day 15 MME/Day    Calculation Information        traMADol (ULTRAM) 50 mg tablet    traMADol 50 mg Tabs: single dose of 50 mg * 3 doses per day * morphine equivalence factor of 0.1 = 15 MME/Day                         PDMP Review       Value Time User    PDMP Reviewed  Yes 8/19/2024  9:40 AM Yifan Stone MD         Review of Systems   Constitutional:  Negative for chills, fatigue and fever.   HENT:  Negative for congestion, ear discharge, ear pain, mouth sores, postnasal drip, sore throat and trouble swallowing.    Eyes:  Negative for pain, discharge and visual disturbance.   Respiratory:  Negative for cough, shortness of breath and wheezing.    Cardiovascular:  Negative for chest pain, palpitations and leg swelling.   Gastrointestinal:  Negative for abdominal distention, abdominal pain, blood in stool, diarrhea and nausea.   Endocrine: Negative for polydipsia, polyphagia and polyuria.   Genitourinary:  Negative for dysuria, frequency, hematuria and urgency.   Musculoskeletal:  Positive for arthralgias. Negative for gait problem and joint swelling.   Skin:  Negative for pallor and rash.   Neurological:  Positive for light-headedness. Negative for dizziness, syncope, speech difficulty, weakness, numbness and headaches.   Hematological:  Negative for adenopathy.   Psychiatric/Behavioral:  Negative for behavioral problems, confusion and sleep disturbance. The patient is not nervous/anxious.      Objective     /100   Pulse 86   Temp 97.6 °F (36.4 °C) (Temporal)   Resp 16   Ht 5' 6.5\" (1.689 m)   Wt 68.3 kg (150 lb 9.6 oz)   SpO2 98%   BMI 23.94 kg/m²     Physical Exam  Constitutional:       General: He is not in acute distress.     Appearance: Normal appearance. He is " well-developed and normal weight. He is not ill-appearing or diaphoretic.   HENT:      Head: Normocephalic and atraumatic.      Right Ear: Tympanic membrane and external ear normal.      Left Ear: Tympanic membrane and external ear normal.      Nose: Nose normal.      Mouth/Throat:      Mouth: Mucous membranes are moist.      Pharynx: No oropharyngeal exudate.   Eyes:      General: No scleral icterus.        Right eye: No discharge.         Left eye: No discharge.      Conjunctiva/sclera: Conjunctivae normal.      Pupils: Pupils are equal, round, and reactive to light.   Neck:      Thyroid: No thyromegaly.      Vascular: No JVD.      Trachea: No tracheal deviation.   Cardiovascular:      Rate and Rhythm: Normal rate and regular rhythm.      Heart sounds: Normal heart sounds. No murmur heard.     No friction rub. No gallop.   Pulmonary:      Effort: Pulmonary effort is normal. No respiratory distress.      Breath sounds: Normal breath sounds. No stridor. No wheezing or rales.   Chest:      Chest wall: No tenderness.   Abdominal:      General: Bowel sounds are normal. There is no distension.      Palpations: Abdomen is soft. There is no mass.      Tenderness: There is no abdominal tenderness. There is no guarding or rebound.      Hernia: No hernia is present.   Genitourinary:     Penis: Normal. No tenderness.       Testes: Normal.      Prostate: Normal.      Rectum: Normal. Guaiac result negative.   Musculoskeletal:         General: No tenderness or deformity.      Cervical back: Normal range of motion and neck supple.      Comments: MODERATE DJD CHANGES     Lymphadenopathy:      Cervical: No cervical adenopathy.   Skin:     General: Skin is warm and dry.      Coloration: Skin is not pale.      Findings: No erythema or rash.   Neurological:      General: No focal deficit present.      Mental Status: He is alert and oriented to person, place, and time.      Cranial Nerves: No cranial nerve deficit.      Sensory: No  sensory deficit.      Motor: No weakness or abnormal muscle tone.      Coordination: Coordination normal.      Gait: Gait normal.      Deep Tendon Reflexes: Reflexes normal.   Psychiatric:         Mood and Affect: Mood normal.         Behavior: Behavior normal.         Thought Content: Thought content normal.         Judgment: Judgment normal.

## 2024-08-18 DIAGNOSIS — N40.1 BENIGN PROSTATIC HYPERPLASIA WITH NOCTURIA: ICD-10-CM

## 2024-08-18 DIAGNOSIS — R35.1 BENIGN PROSTATIC HYPERPLASIA WITH NOCTURIA: ICD-10-CM

## 2024-08-18 RX ORDER — ALFUZOSIN HYDROCHLORIDE 10 MG/1
10 TABLET, EXTENDED RELEASE ORAL DAILY
Qty: 90 TABLET | Refills: 1 | Status: SHIPPED | OUTPATIENT
Start: 2024-08-18

## 2024-08-19 ENCOUNTER — OFFICE VISIT (OUTPATIENT)
Dept: FAMILY MEDICINE CLINIC | Facility: CLINIC | Age: 74
End: 2024-08-19
Payer: MEDICARE

## 2024-08-19 VITALS
WEIGHT: 150.6 LBS | OXYGEN SATURATION: 98 % | DIASTOLIC BLOOD PRESSURE: 100 MMHG | TEMPERATURE: 97.6 F | BODY MASS INDEX: 23.64 KG/M2 | RESPIRATION RATE: 16 BRPM | SYSTOLIC BLOOD PRESSURE: 140 MMHG | HEART RATE: 86 BPM | HEIGHT: 67 IN

## 2024-08-19 DIAGNOSIS — E78.01 FAMILIAL HYPERCHOLESTEROLEMIA: ICD-10-CM

## 2024-08-19 DIAGNOSIS — E78.2 MIXED HYPERLIPIDEMIA: ICD-10-CM

## 2024-08-19 DIAGNOSIS — T84.498S FAILED ORTHOPEDIC IMPLANT, SEQUELA: ICD-10-CM

## 2024-08-19 DIAGNOSIS — G89.29 CHRONIC MIDLINE LOW BACK PAIN WITHOUT SCIATICA: Primary | ICD-10-CM

## 2024-08-19 DIAGNOSIS — M54.50 CHRONIC MIDLINE LOW BACK PAIN WITHOUT SCIATICA: Primary | ICD-10-CM

## 2024-08-19 DIAGNOSIS — G47.33 OBSTRUCTIVE SLEEP APNEA SYNDROME: ICD-10-CM

## 2024-08-19 DIAGNOSIS — M15.9 PRIMARY OSTEOARTHRITIS INVOLVING MULTIPLE JOINTS: ICD-10-CM

## 2024-08-19 DIAGNOSIS — G89.4 CHRONIC PAIN SYNDROME: ICD-10-CM

## 2024-08-19 PROBLEM — J34.2 DEVIATED NASAL SEPTUM: Status: RESOLVED | Noted: 2019-06-11 | Resolved: 2024-08-19

## 2024-08-19 PROCEDURE — G2211 COMPLEX E/M VISIT ADD ON: HCPCS | Performed by: FAMILY MEDICINE

## 2024-08-19 PROCEDURE — 99214 OFFICE O/P EST MOD 30 MIN: CPT | Performed by: FAMILY MEDICINE

## 2024-08-19 RX ORDER — METHYLPREDNISOLONE 4 MG
TABLET, DOSE PACK ORAL
COMMUNITY
Start: 2024-08-14

## 2024-08-20 RX ORDER — ATORVASTATIN CALCIUM 20 MG/1
20 TABLET, FILM COATED ORAL DAILY
Qty: 90 TABLET | Refills: 1 | Status: SHIPPED | OUTPATIENT
Start: 2024-08-20

## 2024-08-20 RX ORDER — AMLODIPINE BESYLATE 5 MG/1
5 TABLET ORAL DAILY
Qty: 90 TABLET | Refills: 1 | Status: SHIPPED | OUTPATIENT
Start: 2024-08-20

## 2024-09-10 DIAGNOSIS — T84.498S FAILED ORTHOPEDIC IMPLANT, SEQUELA: ICD-10-CM

## 2024-09-10 RX ORDER — TRAMADOL HYDROCHLORIDE 50 MG/1
50 TABLET ORAL EVERY 8 HOURS PRN
Qty: 30 TABLET | Refills: 0 | Status: SHIPPED | OUTPATIENT
Start: 2024-09-10

## 2024-09-10 NOTE — TELEPHONE ENCOUNTER
Reason for call:   [x] Refill   [] Prior Auth  [] Other:     Office:   [x] PCP/Provider Yifan Stone MD - [facility]   [] Specialty/Provider -     Medication:     traMADol (ULTRAM) 50 mg tablet         Take 1 tablet (50 mg total) by mouth every 8 (eight) hours as needed for moderate pain       Pharmacy: [facility] #[id number] [address]     Does the patient have enough for 3 days?   [x] Yes   [] No - Send as HP to POD

## 2024-09-19 DIAGNOSIS — M15.9 PRIMARY OSTEOARTHRITIS INVOLVING MULTIPLE JOINTS: ICD-10-CM

## 2024-09-19 RX ORDER — CELECOXIB 200 MG/1
200 CAPSULE ORAL DAILY
Qty: 30 CAPSULE | Refills: 5 | Status: SHIPPED | OUTPATIENT
Start: 2024-09-19

## 2024-09-19 NOTE — TELEPHONE ENCOUNTER
Reason for call:   [x] Refill   [] Prior Auth  [] Other:     Office:   [x] PCP/Provider - Yifan Stone MD   [] Specialty/Provider -     Medication:     celecoxib (CeleBREX) 200 mg capsule     Dose/Frequency:     Take 1 capsule (200 mg total) by mouth daily       Quantity: 30    Pharmacy: [facility] #[id number] [place] [address] [phone]     Does the patient have enough for 3 days?   [x] Yes   [] No - Send as HP to POD

## 2024-10-14 DIAGNOSIS — T84.498S FAILED ORTHOPEDIC IMPLANT, SEQUELA: ICD-10-CM

## 2024-10-14 NOTE — TELEPHONE ENCOUNTER
Reason for call:   [x] Refill   [] Prior Auth  [] Other:     Office:   [x] PCP/Provider -   [] Specialty/Provider -     Medication: traMADol (ULTRAM) 50 mg tablet     Dose/Frequency: Take 1 tablet (50 mg total) by mouth every 8 (eight) hours as needed for moderate pain     Quantity: 30 tablet     Pharmacy: [facility] #[id number] [address] [facility]     Does the patient have enough for 3 days?   [] Yes   [x] No - Send as HP to POD

## 2024-10-15 RX ORDER — TRAMADOL HYDROCHLORIDE 50 MG/1
50 TABLET ORAL EVERY 8 HOURS PRN
Qty: 30 TABLET | Refills: 0 | Status: SHIPPED | OUTPATIENT
Start: 2024-10-15

## 2024-10-30 DIAGNOSIS — E78.01 FAMILIAL HYPERCHOLESTEROLEMIA: ICD-10-CM

## 2024-10-30 DIAGNOSIS — N40.0 PROSTATIC HYPERTROPHY: ICD-10-CM

## 2024-10-30 NOTE — TELEPHONE ENCOUNTER
Pharmacy told patient there were no more refills.    Reason for call:   [x] Refill   [] Prior Auth  [] Other:     Office:   [x] PCP/Provider - Yifan Stone MD   [] Specialty/Provider -     Medication:     - finasteride (PROSCAR) 5 mg tablet. TAKE 1 TABLET(5 MG) BY MOUTH DAILY   Qty: 90    - atorvastatin (LIPITOR) 20 mg tablet. Take 1 tablet (20 mg total) by mouth daily   Qty: 90    Pharmacy: [facility] #[id number] [address] [address]     Does the patient have enough for 3 days?   [x] Yes   [] No - Send as HP to POD

## 2024-10-31 RX ORDER — FINASTERIDE 5 MG/1
5 TABLET, FILM COATED ORAL DAILY
Qty: 90 TABLET | Refills: 1 | Status: SHIPPED | OUTPATIENT
Start: 2024-10-31

## 2024-10-31 RX ORDER — ATORVASTATIN CALCIUM 20 MG/1
20 TABLET, FILM COATED ORAL DAILY
Qty: 90 TABLET | Refills: 1 | Status: SHIPPED | OUTPATIENT
Start: 2024-10-31

## 2024-11-01 DIAGNOSIS — G47.9 SLEEP DISORDER: ICD-10-CM

## 2024-11-01 DIAGNOSIS — R35.1 BENIGN PROSTATIC HYPERPLASIA WITH NOCTURIA: ICD-10-CM

## 2024-11-01 DIAGNOSIS — N40.1 BENIGN PROSTATIC HYPERPLASIA WITH NOCTURIA: ICD-10-CM

## 2024-11-01 RX ORDER — ALFUZOSIN HYDROCHLORIDE 10 MG/1
10 TABLET, EXTENDED RELEASE ORAL DAILY
Qty: 90 TABLET | Refills: 1 | Status: SHIPPED | OUTPATIENT
Start: 2024-11-01

## 2024-11-01 NOTE — TELEPHONE ENCOUNTER
Reason for call:   [x] Refill   [] Prior Auth  [] Other:     Office:   [x] PCP/Provider - [facility] - Yifan Stone MD   [] Specialty/Provider -     Medication:  alfuzosin (UROXATRAL) 10 mg 24 hr tablet    Dose/Frequency: TAKE 1 TABLET(10 MG) BY MOUTH DAILY     Quantity: 90 tablet     Medication: zolpidem (AMBIEN) 10 mg tablet     Dose/Frequency: Take 1 tablet (10 mg total) by mouth daily at bedtime as needed for sleep 1/2 to 1 PRN     Quantity:  30 tablet     Pharmacy: [facility] #[id number] [place] [address] [phone]    Does the patient have enough for 3 days?   [x] Yes   [] No - Send as HP to POD

## 2024-11-04 RX ORDER — ZOLPIDEM TARTRATE 10 MG/1
10 TABLET ORAL
Qty: 30 TABLET | Refills: 0 | Status: SHIPPED | OUTPATIENT
Start: 2024-11-04

## 2024-11-11 ENCOUNTER — OFFICE VISIT (OUTPATIENT)
Dept: FAMILY MEDICINE CLINIC | Facility: CLINIC | Age: 74
End: 2024-11-11
Payer: MEDICARE

## 2024-11-11 ENCOUNTER — NURSE TRIAGE (OUTPATIENT)
Age: 74
End: 2024-11-11

## 2024-11-11 VITALS
WEIGHT: 151 LBS | OXYGEN SATURATION: 96 % | SYSTOLIC BLOOD PRESSURE: 124 MMHG | TEMPERATURE: 98 F | DIASTOLIC BLOOD PRESSURE: 70 MMHG | RESPIRATION RATE: 18 BRPM | BODY MASS INDEX: 23.7 KG/M2 | HEIGHT: 67 IN | HEART RATE: 94 BPM

## 2024-11-11 DIAGNOSIS — T84.498S FAILED ORTHOPEDIC IMPLANT, SEQUELA: ICD-10-CM

## 2024-11-11 DIAGNOSIS — H65.21 RIGHT CHRONIC SEROUS OTITIS MEDIA: Primary | ICD-10-CM

## 2024-11-11 PROCEDURE — G2211 COMPLEX E/M VISIT ADD ON: HCPCS | Performed by: FAMILY MEDICINE

## 2024-11-11 PROCEDURE — 99213 OFFICE O/P EST LOW 20 MIN: CPT | Performed by: FAMILY MEDICINE

## 2024-11-11 RX ORDER — TRAMADOL HYDROCHLORIDE 50 MG/1
50 TABLET ORAL EVERY 8 HOURS PRN
Qty: 30 TABLET | Refills: 0 | Status: SHIPPED | OUTPATIENT
Start: 2024-11-11

## 2024-11-11 NOTE — PROGRESS NOTES
"Ambulatory Visit  Name: Juan Vargas      : 1950      MRN: [id number]  Encounter Provider: Yifan Stone MD  Encounter Date: 2024   Encounter department: [facility]    Assessment & Plan  Right chronic serous otitis media            History of Present Illness     Earache   There is pain in the right ear. This is a recurrent problem. The current episode started 1 to 4 weeks ago. The problem occurs every few minutes. The problem has been waxing and waning. There has been no fever. The pain is mild. Associated symptoms include coughing and rhinorrhea. Pertinent negatives include no abdominal pain, diarrhea, ear discharge, headaches, hearing loss, neck pain, rash, sore throat or vomiting. He has tried acetaminophen and ear drops for the symptoms. The treatment provided no relief.         Review of Systems   Constitutional:  Negative for chills, fatigue and fever.   HENT:  Positive for ear pain and rhinorrhea. Negative for ear discharge, hearing loss and sore throat.    Eyes:  Negative for discharge.   Respiratory:  Positive for cough. Negative for chest tightness.    Cardiovascular:  Negative for chest pain and palpitations.   Gastrointestinal:  Negative for abdominal pain, diarrhea, nausea and vomiting.   Musculoskeletal:  Negative for arthralgias, gait problem and neck pain.   Skin:  Negative for rash.   Neurological:  Negative for dizziness, weakness and headaches.   Hematological:  Negative for adenopathy.   Psychiatric/Behavioral:  The patient is not nervous/anxious.            Objective     /70   Pulse 94   Temp 98 °F (36.7 °C) (Temporal)   Resp 18   Ht 5' 6.5\" (1.689 m)   Wt 68.5 kg (151 lb)   SpO2 96%   BMI 24.01 kg/m²     Physical Exam  Vitals reviewed.   Constitutional:       General: He is not in acute distress.     Appearance: Normal appearance. He is well-developed and normal weight. He is not ill-appearing.   HENT:      Head: Normocephalic and " atraumatic.      Left Ear: Tympanic membrane normal.      Ears:      Comments: R TM DULL     Nose: Congestion present.      Mouth/Throat:      Mouth: Mucous membranes are moist.      Comments: CLEAR PND  Eyes:      General:         Right eye: No discharge.         Left eye: No discharge.      Conjunctiva/sclera: Conjunctivae normal.      Pupils: Pupils are equal, round, and reactive to light.   Neck:      Thyroid: No thyromegaly.      Vascular: No JVD.   Cardiovascular:      Rate and Rhythm: Normal rate and regular rhythm.      Heart sounds: Normal heart sounds. No murmur heard.  Pulmonary:      Effort: Pulmonary effort is normal.      Breath sounds: Normal breath sounds. No wheezing or rales.   Abdominal:      General: Bowel sounds are normal.      Palpations: Abdomen is soft. There is no mass.      Tenderness: There is no abdominal tenderness. There is no guarding or rebound.   Musculoskeletal:         General: No tenderness or deformity. Normal range of motion.      Cervical back: Neck supple.   Lymphadenopathy:      Cervical: No cervical adenopathy.   Skin:     General: Skin is warm and dry.      Findings: No erythema or rash.   Neurological:      General: No focal deficit present.      Mental Status: He is alert and oriented to person, place, and time.   Psychiatric:         Mood and Affect: Mood normal.         Behavior: Behavior normal.         Thought Content: Thought content normal.         Judgment: Judgment normal.

## 2024-11-11 NOTE — TELEPHONE ENCOUNTER
Patient called in reporting an earache. RN unable to reach patient to evaluate for possible OV. Please reach out to patient to follow up.    Reason for Disposition   Third attempt to contact caller AND no contact made. Phone number verified.    Protocols used: No Contact or Duplicate Contact Call-Adult-OH

## 2024-11-11 NOTE — PATIENT INSTRUCTIONS
PLENTY OF FLUIDS - HYDRATION  REST  FLONASE  BALLOONS    MAY NEED TO CONSIDER ENT EVAL  
The patient is a 52y Female complaining of foot pain/injury.

## 2024-11-11 NOTE — TELEPHONE ENCOUNTER
Reason for call: Used last pill this morning.  [x] Refill   [] Prior Auth  [] Other:     Office:   [x] PCP/Provider - Dr Stone  [] Specialty/Provider -     Medication: tramadol     Dose/Frequency: 50 mg Q8H PRN    Quantity: 30    Ph[facility]acy: [facility]     Does the patient have enough for 3 days?   [] Yes   [x] No - Send as HP to POD

## 2024-11-11 NOTE — TELEPHONE ENCOUNTER
Regarding: Ear Ache  ----- Message from Chiara STALEY sent at 11/11/2024  8:38 AM EST -----  Patient called for medication refill and indicated he have a nasty ear ache that he stated if it persist he will go to an urgent care, patient did not elaborate on how long and stated he was driving into an area where the call will drop and needed to end the call.    I asked if he would like me to send a message to dr madison and he stated that dr madison is a very busy doctor and he do not like to bother him unless its urgent, I advised I will send a message to the clinical staff and someone may call him to evaluate the situation they maybe able to bring him in if necessary for a sick visit. Patient verbalized understanding.    Please contact patient to evaluate. # [phone]

## 2024-11-11 NOTE — TELEPHONE ENCOUNTER
Spoke with patient and scheduled OV for 2:45 today with Dr. Stone for evaluation of ear pain. No further action needed

## 2024-11-13 DIAGNOSIS — J30.1 SEASONAL ALLERGIC RHINITIS DUE TO POLLEN: ICD-10-CM

## 2024-11-14 RX ORDER — MOMETASONE FUROATE MONOHYDRATE 50 UG/1
2 SPRAY, METERED NASAL DAILY
Qty: 17 G | Refills: 3 | Status: SHIPPED | OUTPATIENT
Start: 2024-11-14

## 2024-11-18 NOTE — PROGRESS NOTES
Name: Juan Vargas      : 1950      MRN: [id number]  Encounter Provider: Yifan Stone MD  Encounter Date: 2024   Encounter department: [facility]    Assessment & Plan  Chronic midline low back pain without sciatica         Chronic pain syndrome         Continuous opioid dependence (HCC)         Mixed hyperlipidemia           Treatment Plan: PASSIVE STRETCHING  PAIN MEDICATIONS    Opiate risks  There are risks associated with opioid medications, including dependence, addiction and tolerance. The patient understands and agrees to use these medications only as prescribed. Potential side effects of the medications include, but are not limited to, constipation, drowsiness, addiction, impaired judgment and risk of fatal overdose if not taken as prescribed. The patient was warned against driving while taking sedation medications.  Sharing medications is a felony. At this point in time, the patient is showing no signs of addiction, abuse, diversion or suicidal ideation.      PDMP review  [place] or [place]  reviewed. No red flags were identified; safe to proceed with prescription      instructions for management and impressions.           History of Present Illness     Opioid Management:   Type of visit: Follow-up    Pain related diagnoses: CHRONIC LOWER LUMBAR PAIN WITH SCIATICA    Interval history: STABLE  NO CHANGES    Aberrant behavior?: No      Adverse effects from medication?: No      Screening Tools/Assessments:    PHQ-2/9:  Last PHQ-9 score: 2 (Last PHQ-9 date: 5/15/2024)      Drug Screen:  Date of last drug screen: 2024    Opioid agreement:  Active Opioid agreement on file?: Yes    Opioid agreement signed date: 5/15/2024  Opioid agreement expiration date: 5/15/2025    Naloxone:  Currently prescribed Naloxone (Narcan): No      Outpatient Morphine Milligram Equivalents Per Day       24 and after 15 MME/Day      Order Name Dose Route Frequency Maximum MME/Day      traMADol (ULTRAM) 50 mg tablet 50 mg Oral Every 8 hours PRN 15 MME/Day    Total Potential Morphine Milligram Equivalents Per Day 15 MME/Day      Calculation Information          traMADol (ULTRAM) 50 mg tablet    traMADol 50 mg Tabs: single dose of 50 mg * 3 doses per day * morphine equivalence factor of 0.1 = 15 MME/Day                                 PDMP Review         Value Time User    PDMP Reviewed  Yes 11/19/2024  7:50 AM Yifan Stone MD           Review of Systems   Constitutional:  Negative for chills, fatigue and fever.   HENT:  Negative for congestion, ear discharge, ear pain, mouth sores, postnasal drip, sore throat and trouble swallowing.    Eyes:  Negative for pain, discharge and visual disturbance.   Respiratory:  Negative for cough, shortness of breath and wheezing.    Cardiovascular:  Negative for chest pain, palpitations and leg swelling.   Gastrointestinal:  Negative for abdominal distention, abdominal pain, blood in stool, diarrhea and nausea.   Endocrine: Negative for polydipsia, polyphagia and polyuria.   Genitourinary:  Negative for dysuria, frequency, hematuria and urgency.   Musculoskeletal:  Negative for arthralgias, gait problem and joint swelling.   Skin:  Negative for pallor and rash.   Neurological:  Negative for dizziness, syncope, speech difficulty, weakness, light-headedness, numbness and headaches.   Hematological:  Negative for adenopathy.   Psychiatric/Behavioral:  Negative for behavioral problems, confusion and sleep disturbance. The patient is not nervous/anxious.      Objective   There were no vitals taken for this visit.    Physical Exam  Vitals reviewed.   Constitutional:       General: He is not in acute distress.     Appearance: Normal appearance. He is well-developed and normal weight. He is not ill-appearing.   HENT:      Head: Normocephalic and atraumatic.      Nose: Nose normal.   Eyes:      General:         Right eye: No discharge.         Left eye: No  discharge.      Conjunctiva/sclera: Conjunctivae normal.      Pupils: Pupils are equal, round, and reactive to light.   Neck:      Thyroid: No thyromegaly.      Vascular: No JVD.   Cardiovascular:      Rate and Rhythm: Normal rate and regular rhythm.      Heart sounds: Normal heart sounds. No murmur heard.  Pulmonary:      Effort: Pulmonary effort is normal.      Breath sounds: Normal breath sounds. No wheezing or rales.   Abdominal:      General: Bowel sounds are normal.      Palpations: Abdomen is soft. There is no mass.      Tenderness: There is no abdominal tenderness. There is no guarding or rebound.   Musculoskeletal:         General: No tenderness or deformity. Normal range of motion.      Cervical back: Neck supple.      Comments: MILD DJD CHANGES     Lymphadenopathy:      Cervical: No cervical adenopathy.   Skin:     General: Skin is warm and dry.      Findings: No erythema or rash.   Neurological:      General: No focal deficit present.      Mental Status: He is alert and oriented to person, place, and time.   Psychiatric:         Mood and Affect: Mood normal.         Behavior: Behavior normal.         Thought Content: Thought content normal.         Judgment: Judgment normal.

## 2024-11-18 NOTE — PATIENT INSTRUCTIONS
CONTINUE CURRENT TREATMENT PLAN  MEDICATION AS DIRECTED  CONSIDER PT / PAIN MANAGEMENT    RV 3M  Goals of care:  Maximize your health and quality of life by:   Increasing your level of function and activity  Decreasing the negative effects of pain on your life  Minimizing the risks and side effects of medications and ensuring safe use of opioid medication     Ways for you to help meet your goals:  Maintain a healthy lifestyle. This includes proper nutrition, regular physical activity as able, try for 8 hours of sleep per night, use stress reduction strategies, avoid triggers.      Risks and side effects of opioid use:  Prescription opioids carry serious risks of addiction and  overdose, especially with prolonged use. An opioid overdose,  often marked by slowed breathing, can cause sudden death. The  use of prescription opioids can have a number of side effects as  well, even when taken as directed:  Tolerance--meaning you might need to take more of a medication for the same pain relief  Physical dependence--meaning you have symptoms of withdrawal when a medication is stopped  Increased sensitivity to pain  Constipation  Nausea, vomiting, dry mouth  Sleepiness and dizziness   Confusion  Depression  Low levels of testosterone that can result in lower sex drive, energy, and strength  Itching and sweating    If you are prescribed opioids for pain:  Never take opioids in greater amounts or more often than prescribed.  Help prevent misuse and abuse.        - Never sell or share prescription opioids.        - Never use another person’s prescription opioids.  ‡Store prescription opioids in a secure place and out of reach of others (this may include visitors, children, friends, and family).  Safely dispose of unused prescription opioids: Find your community drug take-back program or your pharmacy mail-back program, or flush them down the toilet, following guidance from the Food and Drug Administration  (www.fda.gov/Drugs/ResourcesForYou).  ‡Visit www.cdc.gov/drugoverdose to learn about the risks of opioid abuse and overdose.  If you believe you may be struggling with addiction, tell your health care provider and ask for guidance or call [facility]’s National Helpline at 7-146-548-EPAG.

## 2024-11-19 ENCOUNTER — OFFICE VISIT (OUTPATIENT)
Dept: FAMILY MEDICINE CLINIC | Facility: CLINIC | Age: 74
End: 2024-11-19
Payer: MEDICARE

## 2024-11-19 VITALS
BODY MASS INDEX: 23.39 KG/M2 | TEMPERATURE: 97.3 F | WEIGHT: 149 LBS | HEART RATE: 79 BPM | SYSTOLIC BLOOD PRESSURE: 118 MMHG | OXYGEN SATURATION: 98 % | HEIGHT: 67 IN | RESPIRATION RATE: 18 BRPM | DIASTOLIC BLOOD PRESSURE: 66 MMHG

## 2024-11-19 DIAGNOSIS — E78.2 MIXED HYPERLIPIDEMIA: ICD-10-CM

## 2024-11-19 DIAGNOSIS — G89.29 CHRONIC MIDLINE LOW BACK PAIN WITHOUT SCIATICA: Primary | ICD-10-CM

## 2024-11-19 DIAGNOSIS — G89.4 CHRONIC PAIN SYNDROME: ICD-10-CM

## 2024-11-19 DIAGNOSIS — M54.50 CHRONIC MIDLINE LOW BACK PAIN WITHOUT SCIATICA: Primary | ICD-10-CM

## 2024-11-19 DIAGNOSIS — F11.20 CONTINUOUS OPIOID DEPENDENCE (HCC): ICD-10-CM

## 2024-11-19 PROCEDURE — G2211 COMPLEX E/M VISIT ADD ON: HCPCS | Performed by: FAMILY MEDICINE

## 2024-11-19 PROCEDURE — 99214 OFFICE O/P EST MOD 30 MIN: CPT | Performed by: FAMILY MEDICINE

## 2024-11-22 DIAGNOSIS — G47.9 SLEEP DISORDER: ICD-10-CM

## 2024-11-22 NOTE — TELEPHONE ENCOUNTER
Reason for call:   [x] Refill   [] Prior Auth  [] Other:     Office:   [x] PCP/Provider -   [] Specialty/Provider -     Medication: zolpidem (AMBIEN) 10 mg tablet     Dose/Frequency: Take 1 tablet (10 mg total) by mouth daily at bedtime as needed for sleep 1/2 to 1 PRN     Quantity: 30    Pharmacy: [facility] #[id number] [place] [address] [phone]    Does the patient have enough for 3 days?   [x] Yes   [] No - Send as HP to POD

## 2024-11-22 NOTE — TELEPHONE ENCOUNTER
Requested medication(s) are due for refill today: No  Patient has already received a courtesy refill: No  Other reason request has been forwarded to provider: refill requested too soon

## 2024-11-23 RX ORDER — ZOLPIDEM TARTRATE 10 MG/1
10 TABLET ORAL
Qty: 30 TABLET | Refills: 0 | Status: SHIPPED | OUTPATIENT
Start: 2024-11-23

## 2024-12-12 DIAGNOSIS — T84.498S FAILED ORTHOPEDIC IMPLANT, SEQUELA: ICD-10-CM

## 2024-12-12 RX ORDER — TRAMADOL HYDROCHLORIDE 50 MG/1
50 TABLET ORAL EVERY 8 HOURS PRN
Qty: 30 TABLET | Refills: 0 | Status: SHIPPED | OUTPATIENT
Start: 2024-12-12

## 2024-12-12 NOTE — TELEPHONE ENCOUNTER
Reason for call:   [x] Refill   [] Prior Auth  [] Other:     Office:   [x] PCP/Provider - Yifan Stone MD   [] Specialty/Provider -     Medication:     traMADol (ULTRAM) 50 mg tablet       Dose/Frequency: 50 mg, Oral, Every 8 hours PRN     Quantity: 30    Pharmacy: [facility] #[id number]     Does the patient have enough for 3 days?   [x] Yes   [] No - Send as HP to POD

## 2024-12-17 ENCOUNTER — TELEPHONE (OUTPATIENT)
Age: 74
End: 2024-12-17

## 2024-12-17 NOTE — TELEPHONE ENCOUNTER
Patient would like a recommendation for an ENT in the [facility], that Dr. Stone would recommend.  Please call the patient with name and number.  Thank you.

## 2025-01-02 ENCOUNTER — SOCIAL WORK (OUTPATIENT)
Age: 75
End: 2025-01-02

## 2025-01-02 DIAGNOSIS — F43.21 ADJUSTMENT DISORDER WITH DEPRESSED MOOD: Primary | ICD-10-CM

## 2025-01-02 PROCEDURE — 90791 PSYCH DIAGNOSTIC EVALUATION: CPT | Performed by: SOCIAL WORKER

## 2025-01-02 NOTE — PSYCH
"This note was not shared with the patient due to this is a psychotherapy note    Behavioral Health Psychotherapy Assessment    Date of Initial Psychotherapy Assessment: 01/20/25  Referral Source: primary care physician  Has a release of information been signed for the referral source? No    Preferred Name: Juan Vargas  Preferred Pronouns: He/him  YOB: 1950 Age: 74 y.o.  Sex assigned at birth: male   Gender Identity: male  Race:   Preferred Language: English    Emergency Contact:  Full Name: see medical record for additional information  Relationship to Client: see medical record for additional information  Contact information:  see medical record for additional information    Primary Care Physician:  Yifan Stone MD  [address] [place] [address]  [phone]  Has a release of information been signed? No    Physical Health History:  Past surgical procedures: see medical record for additional information  Do you have a history of any of the following: other see medical record  Do you have any mobility issues? No    Relevant Family History:    On November 26, 2024, Juan was referred by his primary care physician for Integrated Behavioral Health Services.   In January 2022 to May 2022, Juan had previously been treated by the in-house [facility] female therapist.   He is now requesting \"an age appropriate counselor\" to address stressors at home.    For today's session, the undersigned therapist provided Juan with an orientation to Integrated Behavioral Health services by summarizing the counseling process, counseling experience, and philosophy of treatment.    Discussed hospital policies and paid special attention to reviewing the limitations of confidentiality and emergency services.   Began the process of establishing rapport and gaining a thorough understanding of the presenting problem by completing a comprehensive psychosocial assessment.    Prior to today's session, on November 19, " "2024, Juan had an office visit with his primary care physician for medication management.  He has a diagnosis of chronic midline low back pain without sciatica, chronic pain syndrome, opioid dependence, and mixed lipidemia.  Juan has been  to his wife for 40 years.  He reports the couple has filed for  a couple of times.    He describes his marriage as being difficult for over 20 years.   He's not sure he likes his wife.   Juan and his wife adopted their 30-year old daughter from [place] and he reports, his adoptive daughter has had and continues to exhibit significant mental health issues.   His wife suffers from bone cancer for over 13 years after given a prognosis she only had a 50/50 chance of making it past five years.  Juan describes his wife as a \"germ phobic\".   Juan lives with his wife and daughter.   His daughter can't work due to her disabilities.   Juan suffers from chronic back pain.   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   There is a significant history of using drugs and alcohol.   He admits to using marijuana lightly about five years ago.   Juan describes himself now as a social drinker.   He was sober for 25 years from alcohol, the last time he used cocaine was about 15 years ago.   Juan used to despise himself for his behavior as a youth.   He struggles to forgive himself for being a bad person and has many regrets.   Juan grew up on [place], [place].   Many years ago, Juan and his wife participated in outpatient couples counseling secondary to Juan's sexual performance issues and overall marital discord.   He claims to have participated in \"lots of mental health counseling\" in the past.         Presenting Problem (What brings you in?)  Juan is a 74-year old,  male who presented for an initial outpatient individual counseling session.   Juan is seeking counseling to address his difficulty coping with marital discord, his wife's cancer history, and his " daughter's mental health issues.    He denies depression but feels sad.   Juan denies suicidal ideation, gesture or plan.   Juan is struggling to cope with chronic pain.  Juan feels overwhelmed with his full-time care taking duties for his wife and adoptive daughter.   The undersigned therapist recommended Juan participate in outpatient counseling once per week.      Mental Health Advance Directive:  Do you currently have a Mental Health Advance Directive?no    Diagnosis:   Diagnosis ICD-10-CM Associated Orders   1. Adjustment disorder with depressed mood  F43.21           Initial Assessment:     Current Mental Status:    Appearance: appropriate      Behavior/Manner: cooperative      Affect/Mood:  Relaxed    Speech:  Normal    Sleep:  Normal    Oriented to: oriented to self, oriented to place and oriented to time       Clinical Symptoms    Have you ever been assaultive to others or the environment: No      Have you ever been self-injurious: No      Counseling History:  Previous Counseling or Treatment  (Mental Health or Drug & Alcohol): Yes    Previous Counseling Details:  Please see below for additional information  Have you previously taken psychiatric medications: Yes    Previous Medications Attempted:  Unknown    Suicide Risk Assessment  Have you ever had a suicide attempt: No    Have you had incidents of suicidal ideation: No    Are you currently experiencing suicidal thoughts: No      Substance Abuse/Addiction Assessment:  Alcohol: Yes    Age of First Use:  Unknown  Age of regular use:  Unknown  Frequency:  Daily  Amount:  Unknown  Last use:  Unknown  Heroin: No    Fentanyl: No    Opiates: No    Cocaine: Yes    Age of First Use:  Unknown  Age of regular use:  Unknown  Frequency:  Weekly  Amount:  Unknown  Method:  Nasal/snort  Amphetamines: No    Hallucinogens: No    Club Drugs: No    Benzodiazepines: No    Marijuana: Yes    Age of First Use:  69  Age of regular use:  N/a  Frequency:  Other  Other frequency:   A few times  Amount:  Unknown  Method:  Smoke/pipe  Last Use:  Five years ago  Tobacco/Nicotine: No    Have you experienced blackouts as a result of substance use: No    Have you had any periods of abstinence: Yes    Additional Abstinence information:  25 years  Have you experienced symptoms of withdrawal: No    Have you ever overdosed on any substances?: No    Are you currently using any Medication Assisted Treatment for Substance Use: No      Disordered Eating History:  Do you have a history of disordered eating: No      Social Determinants of Health:    SDOH:  Financial instability and stress    Trauma and Abuse History:    Have you ever been abused: No      Legal History:    Have you ever been arrested  or had a DUI: No      Have you been incarcerated: No      Are you currently on parole/probation: No      Any current Children and Youth involvement: No      Any pending legal charges: No      Relationship History:    Current marital status:       Natural Supports:  Other    Other natural supports:  Wife and daughter    Relationship History:  See below for additional information    Employment History    Are you currently employed: Yes      Longest period of employment:  Unknown    Employer/ Job title:  Unknown    Future work goals:  skilled nursing    Sources of income/financial support:  Work and assisted Pension     History:      Status: no history of  duty  Educational History:     Have you ever been diagnosed with a learning disability: No      Highest level of education:  High school graduate    School attended/attending:  Unknown    Have you ever had an IEP or 504-plan: No      Do you need assistance with reading or writing: No      Recommended Treatment:     Psychotherapy:  Individual sessions    Frequency:  1 time    Session frequency:  Weekly    Visit start and stop times:    01/20/25  Start Time: 0802  Stop Time: 0900  Total Visit Time: 58 minutes

## 2025-01-13 DIAGNOSIS — G47.9 SLEEP DISORDER: ICD-10-CM

## 2025-01-13 DIAGNOSIS — T84.498S FAILED ORTHOPEDIC IMPLANT, SEQUELA: ICD-10-CM

## 2025-01-13 RX ORDER — TRAMADOL HYDROCHLORIDE 50 MG/1
50 TABLET ORAL EVERY 8 HOURS PRN
Qty: 30 TABLET | Refills: 0 | Status: SHIPPED | OUTPATIENT
Start: 2025-01-13

## 2025-01-13 RX ORDER — ZOLPIDEM TARTRATE 10 MG/1
10 TABLET ORAL
Qty: 30 TABLET | Refills: 0 | Status: SHIPPED | OUTPATIENT
Start: 2025-01-13

## 2025-01-13 NOTE — TELEPHONE ENCOUNTER
Reason for call:   [x] Refill   [] Prior Auth  [] Other:     Office:   [x] PCP/Provider -   [] Specialty/Provider -     Medication:   traMADol (ULTRAM) 50 mg/Take 1 tablet (50 mg total) by mouth every 8 (eight) hours as needed     zolpidem (AMBIEN) 10 mg/Take 1 tablet (10 mg total) by mouth daily at bedtime as needed     Quantity: 30    Pharmacy: [facility] #[id number]     Does the patient have enough for 3 days?   [] Yes   [x] No - Send as HP to POD

## 2025-01-14 ENCOUNTER — SOCIAL WORK (OUTPATIENT)
Age: 75
End: 2025-01-14

## 2025-01-14 DIAGNOSIS — R21 RASH: Primary | ICD-10-CM

## 2025-01-14 DIAGNOSIS — F43.21 ADJUSTMENT DISORDER WITH DEPRESSED MOOD: Primary | ICD-10-CM

## 2025-01-14 PROCEDURE — 90837 PSYTX W PT 60 MINUTES: CPT | Performed by: SOCIAL WORKER

## 2025-01-14 RX ORDER — CLOTRIMAZOLE AND BETAMETHASONE DIPROPIONATE 10; .64 MG/G; MG/G
CREAM TOPICAL 2 TIMES DAILY
Qty: 45 G | Refills: 1 | Status: SHIPPED | OUTPATIENT
Start: 2025-01-14

## 2025-01-14 NOTE — PSYCH
Behavioral Health Clinician ([facility]) Note        Name: Juan Vargas  : 1950   Date: 25    Location: [facility], Integrated Behavioral Health - { Locations:19773}  Encounter Type: Behavioral Health Clinician Intervention     Time:   Start Time: 804    Diagnosis:   Admission Diagnosis: No diagnosis found.   Current Diagnosis: No diagnosis found.    Chief Complaint:  Juan Vargas presented for treatment due to complaints of {[facility] Chief Complaint:20277}    Assessment & Safety Planning:    Risk Assessment:    The following ratings are based on my evaluation/assessment of Juan Vargas.   Risk of Harm to Self:    Demographic risk factors include (check all that apply): {[facility] Demographics risk factors:35275}  Historical Risk Factors include (check all that apply): {[facility] Historical Risk Factors:30427}    Based on the above information, the client presents the following risk of harm to self or others: *CHECK ONE*  LOW  []  MEDIUM   []  HIGH    []  The following interventions are recommended: {INTERVENTIONS:64580}    Substance Abuse Assessment (check all that apply):   {[facility] substance abuse assessment:55028}  Planning & Goal Setting:  Juan Vargas was seen for {[facility] Planning and Goal Settin}  Treatment Modality Utilized (check all that apply):  {[facility] Treatment Modality:29574}  Results of Screening Tools:  PHQ-2/9 Depression Screening                 Was this a virtual/tele-health visit?  {[facility] Is this a virtual visit:35299}    Additional Comments  ***

## 2025-01-16 DIAGNOSIS — N40.0 PROSTATIC HYPERTROPHY: ICD-10-CM

## 2025-01-16 DIAGNOSIS — R03.0 ELEVATED BLOOD-PRESSURE READING WITHOUT DIAGNOSIS OF HYPERTENSION: ICD-10-CM

## 2025-01-16 DIAGNOSIS — K21.9 LPRD (LARYNGOPHARYNGEAL REFLUX DISEASE): ICD-10-CM

## 2025-01-16 DIAGNOSIS — E78.2 MIXED HYPERLIPIDEMIA: ICD-10-CM

## 2025-01-16 DIAGNOSIS — Z00.00 MEDICARE ANNUAL WELLNESS VISIT, SUBSEQUENT: Primary | ICD-10-CM

## 2025-01-21 ENCOUNTER — SOCIAL WORK (OUTPATIENT)
Age: 75
End: 2025-01-21

## 2025-01-21 DIAGNOSIS — F43.21 ADJUSTMENT DISORDER WITH DEPRESSED MOOD: Primary | ICD-10-CM

## 2025-01-21 PROCEDURE — 90837 PSYTX W PT 60 MINUTES: CPT | Performed by: SOCIAL WORKER

## 2025-01-28 ENCOUNTER — SOCIAL WORK (OUTPATIENT)
Age: 75
End: 2025-01-28

## 2025-01-28 DIAGNOSIS — F43.21 ADJUSTMENT DISORDER WITH DEPRESSED MOOD: Primary | ICD-10-CM

## 2025-01-28 LAB
BASOPHILS # BLD AUTO: 0.1 X10E3/UL (ref 0–0.2)
BASOPHILS NFR BLD AUTO: 2 %
CHOLEST SERPL-MCNC: 176 MG/DL (ref 100–199)
CHOLEST/HDLC SERPL: 2.1 RATIO (ref 0–5)
EOSINOPHIL # BLD AUTO: 0.3 X10E3/UL (ref 0–0.4)
EOSINOPHIL NFR BLD AUTO: 7 %
ERYTHROCYTE [DISTWIDTH] IN BLOOD BY AUTOMATED COUNT: 13.2 % (ref 11.6–15.4)
HCT VFR BLD AUTO: 45.4 % (ref 37.5–51)
HDLC SERPL-MCNC: 84 MG/DL
HGB BLD-MCNC: 15.5 G/DL (ref 13–17.7)
IMM GRANULOCYTES # BLD: 0 X10E3/UL (ref 0–0.1)
IMM GRANULOCYTES NFR BLD: 0 %
LDLC SERPL CALC-MCNC: 80 MG/DL (ref 0–99)
LYMPHOCYTES # BLD AUTO: 1 X10E3/UL (ref 0.7–3.1)
LYMPHOCYTES NFR BLD AUTO: 30 %
MCH RBC QN AUTO: 30.1 PG (ref 26.6–33)
MCHC RBC AUTO-ENTMCNC: 34.1 G/DL (ref 31.5–35.7)
MCV RBC AUTO: 88 FL (ref 79–97)
MONOCYTES # BLD AUTO: 0.2 X10E3/UL (ref 0.1–0.9)
MONOCYTES NFR BLD AUTO: 7 %
NEUTROPHILS # BLD AUTO: 1.9 X10E3/UL (ref 1.4–7)
NEUTROPHILS NFR BLD AUTO: 54 %
PLATELET # BLD AUTO: 163 X10E3/UL (ref 150–450)
RBC # BLD AUTO: 5.15 X10E6/UL (ref 4.14–5.8)
SL AMB VLDL CHOLESTEROL CALC: 12 MG/DL (ref 5–40)
TRIGL SERPL-MCNC: 65 MG/DL (ref 0–149)
WBC # BLD AUTO: 3.4 X10E3/UL (ref 3.4–10.8)

## 2025-01-28 PROCEDURE — 90837 PSYTX W PT 60 MINUTES: CPT | Performed by: SOCIAL WORKER

## 2025-01-28 NOTE — PSYCH
"This note was not shared with the patient due to this is a psychotherapy note    Behavioral Health Psychotherapy Progress Note    Psychotherapy Provided: Individual Psychotherapy     1. Adjustment disorder with depressed mood            DATA:   Juan is a 74-year old,  male who presented for a follow-up outpatient individual counseling session.   The undersigned therapist assisted Juan process his feelings about marital conflict.   The undersigned therapist assisted Juan explore his feelings about care taking duties for his adult adopted daughter.  Juan denied depression, anxiety or difficulty managing his mood. There was no evidence of a thought disorder or psychosis.   Juan denied suicidal ideation, gesture or plan.    During this session, this clinician used the following therapeutic modalities: Supportive Psychotherapy    Substance Abuse was not addressed during this session. If the client is diagnosed with a co-occurring substance use disorder, please indicate any changes in the frequency or amount of use: NA. Stage of change for addressing substance use diagnoses: No substance use/Not applicable    ASSESSMENT:  Juan Vargas presents with a  stressed  mood.    His affect is Normal range and intensity, which is congruent, with his mood and the content of the session. The client has not made progress on their goals.     Juan Vargas presents with a none risk of suicide, none risk of self-harm, and none risk of harm to others.    For any risk assessment that surpasses a \"low\" rating, a safety plan must be developed.    A safety plan was indicated: no  If yes, describe in detail:  We have discussed their safety plan and Juan agrees that if they experience unsafe thoughts that they will reach out to their supports including this office, the suicide hotline, and emergency services if necessary. Juan is aware of non-emergent and emergent mental health resources.    Depression Follow-up Plan Completed: " No    Visit start and stop times:    01/28/25  Start Time: 0805  End Time:  9:00 AM

## 2025-01-29 LAB
ALBUMIN SERPL-MCNC: 4.5 G/DL (ref 3.8–4.8)
ALP SERPL-CCNC: 85 IU/L (ref 44–121)
ALT SERPL-CCNC: 14 IU/L (ref 0–44)
AST SERPL-CCNC: 19 IU/L (ref 0–40)
BILIRUB SERPL-MCNC: 0.8 MG/DL (ref 0–1.2)
BUN SERPL-MCNC: 14 MG/DL (ref 8–27)
BUN/CREAT SERPL: 14 (ref 10–24)
CALCIUM SERPL-MCNC: 9.8 MG/DL (ref 8.6–10.2)
CHLORIDE SERPL-SCNC: 101 MMOL/L (ref 96–106)
CO2 SERPL-SCNC: 24 MMOL/L (ref 20–29)
CREAT SERPL-MCNC: 0.98 MG/DL (ref 0.76–1.27)
EGFR: 80 ML/MIN/1.73
GLOBULIN SER-MCNC: 1.9 G/DL (ref 1.5–4.5)
GLUCOSE SERPL-MCNC: 99 MG/DL (ref 70–99)
POTASSIUM SERPL-SCNC: 4.3 MMOL/L (ref 3.5–5.2)
PROT SERPL-MCNC: 6.4 G/DL (ref 6–8.5)
PSA SERPL-MCNC: 1.1 NG/ML (ref 0–4)
SL AMB REFLEX CRITERIA: NORMAL
SODIUM SERPL-SCNC: 140 MMOL/L (ref 134–144)

## 2025-01-30 NOTE — PSYCH
"This note was not shared with the patient due to this is a psychotherapy note    Behavioral Health Psychotherapy Progress Note    Psychotherapy Provided: Individual Psychotherapy     1. Adjustment disorder with depressed mood            DATA:   Juan is a 74-year old,  male who presented for a follow-up outpatient individual counseling session after an approximate two-weeks service gap as he was a no show for last week's previously-scheduled session after being confused about the day and time of his session and showing up on the wrong day.  For today's session with the undersigned therapist, Juan provided a brief summary of his employment history.   Currently, he considers himself not to be retired.  In 2024, he had a lay-off from work..  He was working as a  and allowed his license to .  Juan needs to continue to work to maintain his health insurance.  He says his wife is the longest living patient with multiple myeloma (13 years).   His wife is currently in remission.   Juan's wife leaves their home to travel 25 to 40 times per year to participate in dog agility competitions.    He prefers she is not around and likes when she travels.    Juan claims he loves his wife but is not in love with her.   He feels guilty about leaving her due to her health history.   Juan feels depressed at times due to thinking about all the \"bad things\" he has done in his life.   Juan provided additional history regarding his daughter's adoption from [place] and her current mental health and developmental disbilities.       During this session, this clinician used the following therapeutic modalities: Supportive Psychotherapy & family systems theory    Substance Abuse was not addressed during this session. If the client is diagnosed with a co-occurring substance use disorder, please indicate any changes in the frequency or amount of use: NA. Stage of change for addressing substance use diagnoses: " "No substance use/Not applicable    ASSESSMENT:  Juan Vargas presents with a Dysthymic mood.    His affect is Normal range and intensity, which is congruent, with his mood and the content of the session. The client has not made progress on their goals.     Juan Vargas presents with a none risk of suicide, none risk of self-harm, and none risk of harm to others.    For any risk assessment that surpasses a \"low\" rating, a safety plan must be developed.    A safety plan was indicate:  No.  If yes, describe in detail:  We have discussed their safety plan and Juan agrees that if they experience unsafe thoughts that they will reach out to their supports including this office, the suicide hotline, and emergency services if necessary. Juan is aware of non-emergent and emergent mental health resources    Depression Follow-up Plan Completed: No    Visit start and stop times:    01/30/25  Start Time: 0804  Stop Time: 0900  Total Visit Time: 56 minutes  "

## 2025-02-04 NOTE — PSYCH
This note was not shared with the patient due to this is a psychotherapy note    Behavioral Health Psychotherapy Progress Note    Psychotherapy Provided: Individual Psychotherapy     1. Adjustment disorder with depressed mood            DATA:   Juan is a 74-year old,  male who presented for a follow-up outpatient individual counseling session  Juan continues to struggle managing his depression and anxiety.   Juan denies suicidal ideation, gesture or plan.   There was no evidence of a thought disorder or psychosis.  Juan is struggling to cope with a multitude of psychosocial stressors including but not limited to marital discord, his care taking duties for his adult developmentally disabled and mentally ill adopted daughter, and financial stressors.   The undersigned therapist assisted Juan develop effective mood management strategies and stress management skills.  The undersigned therapist assisted Juan develop effective conflict resolution strategies secondary to his daughter's acting-out behaviors.  The undersigned therapist assisted Juan process his guilt about asking his wife for a divorce.   Although Juan is very unhappy in his marriage, he feels obligated to stay in the marriage and provide support to wife due to her significant health problems.    During this session, this clinician used the following therapeutic modalities: Cognitive Behavioral Therapy, Supportive Psychotherapy, and family systems theory    Substance Abuse was not addressed during this session. If the client is diagnosed with a co-occurring substance use disorder, please indicate any changes in the frequency or amount of use: Juan has a significant substance abuse history. Stage of change for addressing substance use diagnoses: Action    ASSESSMENT:  Juan Vargas presents with a  stressed  mood.     His affect is Normal range and intensity, which is congruent, with his mood and the content of the session. The client has not made  "progress on their goals.     Juan Vargas presents with a none risk of suicide, none risk of self-harm, and none risk of harm to others.    For any risk assessment that surpasses a \"low\" rating, a safety plan must be developed.    A safety plan was indicated: no  If yes, describe in detail:  We have discussed their safety plan and Juan agrees that if they experience unsafe thoughts that they will reach out to their supports including this office, the suicide hotline, and emergency services if necessary. Juan is aware of non-emergent and emergent mental health resources.    Depression Follow-up Plan Completed: No    Visit start and stop times:    02/04/25  Start Time: 0805  Stop Time: 0900  Total Visit Time: 55 minutes  "

## 2025-02-05 DIAGNOSIS — T84.498S FAILED ORTHOPEDIC IMPLANT, SEQUELA: ICD-10-CM

## 2025-02-05 NOTE — TELEPHONE ENCOUNTER
Reason for call:   [x] Refill   [] Prior Auth  [] Other:     Office:   [x] PCP/Provider -   [] Specialty/Provider -     Medication:   - Amlodipine 5mg- take 1 tablet by mouth daily      [facility]: [facility]    Does the patient have enough for 3 days?   [] Yes   [] No - Send as HP to POD

## 2025-02-06 RX ORDER — AMLODIPINE BESYLATE 5 MG/1
5 TABLET ORAL DAILY
Qty: 90 TABLET | Refills: 1 | Status: SHIPPED | OUTPATIENT
Start: 2025-02-06

## 2025-02-13 ENCOUNTER — SOCIAL WORK (OUTPATIENT)
Age: 75
End: 2025-02-13

## 2025-02-13 ENCOUNTER — RA CDI HCC (OUTPATIENT)
Dept: OTHER | Facility: HOSPITAL | Age: 75
End: 2025-02-13

## 2025-02-13 DIAGNOSIS — F43.21 ADJUSTMENT DISORDER WITH DEPRESSED MOOD: Primary | ICD-10-CM

## 2025-02-13 PROCEDURE — 90837 PSYTX W PT 60 MINUTES: CPT | Performed by: SOCIAL WORKER

## 2025-02-13 NOTE — PSYCH
"This note was not shared with the patient due to this is a psychotherapy note    Behavioral Health Psychotherapy Progress Note    Psychotherapy Provided: Individual Psychotherapy     1. Adjustment disorder with depressed mood            DATA:   Juan is a 74-year old,  male who presented for a follow-up outpatient individual counseling session after an approximate four-weeks service gap as one of his previously-scheduled sessions with the undersigned therapist was cancelled due to the office being closed for inclement weather.  The undersigned therapist provided Juan with psychoeducation regarding the different types of mood disorders.  The undersigned therapist assisted Juan process his feelings about his daughter's criminal history including currently being on probation.  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   Juan suffers from chronic pain.   He claims to physically hurt all the time.   Juan rates his current depression level as a \"7\" on a depression severity scale of \"1 to 10\".    There was no evidence of a thought disorder or psychosis.   Juan denied suicidal ideation, gesture or plan.  The undersigned therapist assisted Juan develop effective mood management strategies.    During this session, this clinician used the following therapeutic modalities: Cognitive Behavioral Therapy and Supportive Psychotherapy    Substance Abuse was not addressed during this session. If the client is diagnosed with a co-occurring substance use disorder, please indicate any changes in the frequency or amount of use: NA. Stage of change for addressing substance use diagnoses: No substance use/Not applicable    ASSESSMENT:  Juan Vargas presents with a Depressed mood.    His affect is Normal range and intensity, which is congruent, with his mood and the content of the session. The client has not made progress on their goals.     Juan Vargas presents with a none risk " "of suicide, none risk of self-harm, and none risk of harm to others.    For any risk assessment that surpasses a \"low\" rating, a safety plan must be developed.    A safety plan was indicated: no  If yes, describe in detail:  We have discussed their safety plan and Juan agrees that if they experience unsafe thoughts that they will reach out to their supports including this office, the suicide hotline, and emergency services if necessary. Juan is aware of non-emergent and emergent mental health resources.    Depression Follow-up Plan Completed: No    Visit start and stop times:    02/13/25  Start Time: 0805  End Time: 9:00 AM  "

## 2025-02-18 RX ORDER — PANTOPRAZOLE SODIUM 40 MG/1
40 TABLET, DELAYED RELEASE ORAL EVERY MORNING
COMMUNITY
Start: 2025-01-13

## 2025-02-19 ENCOUNTER — OFFICE VISIT (OUTPATIENT)
Dept: FAMILY MEDICINE CLINIC | Facility: CLINIC | Age: 75
End: 2025-02-19
Payer: MEDICARE

## 2025-02-19 VITALS
RESPIRATION RATE: 14 BRPM | TEMPERATURE: 95.5 F | OXYGEN SATURATION: 97 % | WEIGHT: 154 LBS | HEART RATE: 83 BPM | BODY MASS INDEX: 24.17 KG/M2 | HEIGHT: 67 IN

## 2025-02-19 DIAGNOSIS — G89.29 CHRONIC MIDLINE LOW BACK PAIN WITHOUT SCIATICA: ICD-10-CM

## 2025-02-19 DIAGNOSIS — G89.4 CHRONIC PAIN SYNDROME: ICD-10-CM

## 2025-02-19 DIAGNOSIS — R03.0 ELEVATED BLOOD-PRESSURE READING WITHOUT DIAGNOSIS OF HYPERTENSION: Primary | ICD-10-CM

## 2025-02-19 DIAGNOSIS — Z13.29 SCREENING FOR HYPOTHYROIDISM: ICD-10-CM

## 2025-02-19 DIAGNOSIS — I70.90 ATHEROSCLEROSIS: ICD-10-CM

## 2025-02-19 DIAGNOSIS — F11.20 CONTINUOUS OPIOID DEPENDENCE (HCC): ICD-10-CM

## 2025-02-19 DIAGNOSIS — Z12.5 ENCOUNTER FOR PROSTATE CANCER SCREENING: ICD-10-CM

## 2025-02-19 DIAGNOSIS — M54.50 CHRONIC MIDLINE LOW BACK PAIN WITHOUT SCIATICA: ICD-10-CM

## 2025-02-19 DIAGNOSIS — K21.9 LPRD (LARYNGOPHARYNGEAL REFLUX DISEASE): ICD-10-CM

## 2025-02-19 DIAGNOSIS — E78.2 MIXED HYPERLIPIDEMIA: ICD-10-CM

## 2025-02-19 DIAGNOSIS — Z00.00 MEDICARE ANNUAL WELLNESS VISIT, SUBSEQUENT: ICD-10-CM

## 2025-02-19 DIAGNOSIS — M15.0 PRIMARY OSTEOARTHRITIS INVOLVING MULTIPLE JOINTS: ICD-10-CM

## 2025-02-19 DIAGNOSIS — N40.0 BENIGN PROSTATIC HYPERPLASIA, UNSPECIFIED WHETHER LOWER URINARY TRACT SYMPTOMS PRESENT: ICD-10-CM

## 2025-02-19 DIAGNOSIS — I71.20 THORACIC AORTIC ANEURYSM WITHOUT RUPTURE, UNSPECIFIED PART (HCC): ICD-10-CM

## 2025-02-19 DIAGNOSIS — Z12.11 COLON CANCER SCREENING: ICD-10-CM

## 2025-02-19 PROBLEM — R06.02 SHORTNESS OF BREATH: Status: RESOLVED | Noted: 2024-08-01 | Resolved: 2025-02-19

## 2025-02-19 LAB — SL AMB POCT FECES OCC BLD: NORMAL

## 2025-02-19 PROCEDURE — 99214 OFFICE O/P EST MOD 30 MIN: CPT | Performed by: FAMILY MEDICINE

## 2025-02-19 PROCEDURE — 82270 OCCULT BLOOD FECES: CPT | Performed by: FAMILY MEDICINE

## 2025-02-19 PROCEDURE — G2211 COMPLEX E/M VISIT ADD ON: HCPCS | Performed by: FAMILY MEDICINE

## 2025-02-19 PROCEDURE — G0439 PPPS, SUBSEQ VISIT: HCPCS | Performed by: FAMILY MEDICINE

## 2025-02-19 RX ORDER — HYDROCODONE BITARTRATE AND ACETAMINOPHEN 5; 325 MG/1; MG/1
1 TABLET ORAL EVERY 6 HOURS PRN
Qty: 5 TABLET | Refills: 0 | Status: SHIPPED | OUTPATIENT
Start: 2025-02-19

## 2025-02-19 NOTE — LETTER
CIERRA ROSENBERG      Current Outpatient Medications:     pantoprazole (PROTONIX) 40 mg tablet, Take 40 mg by mouth every morning Take on an empty stomach, Disp: , Rfl:     albuterol (PROVENTIL HFA,VENTOLIN HFA) 90 mcg/act inhaler, Inhale 2 puffs every 4 (four) hours as needed for wheezing every 4 - 6 hours as needed, Disp: 18 g, Rfl: 3    alfuzosin (UROXATRAL) 10 mg 24 hr tablet, Take 1 tablet (10 mg total) by mouth daily, Disp: 90 tablet, Rfl: 1    amLODIPine (NORVASC) 5 mg tablet, Take 1 tablet (5 mg total) by mouth daily, Disp: 90 tablet, Rfl: 1    Aspirin 325 MG CAPS, Take by mouth, Disp: , Rfl:     atorvastatin (LIPITOR) 20 mg tablet, Take 1 tablet (20 mg total) by mouth daily, Disp: 90 tablet, Rfl: 1    celecoxib (CeleBREX) 200 mg capsule, Take 1 capsule (200 mg total) by mouth daily, Disp: 30 capsule, Rfl: 5    Cholecalciferol (VITAMIN D3 PO), Take 2,000 Units by mouth, Disp: , Rfl:     clotrimazole-betamethasone (LOTRISONE) 1-0.05 % cream, Apply topically 2 (two) times a day, Disp: 45 g, Rfl: 1    finasteride (PROSCAR) 5 mg tablet, Take 1 tablet (5 mg total) by mouth daily, Disp: 90 tablet, Rfl: 1    fluticasone (FLONASE) 50 mcg/act nasal spray, 1 spray into each nostril daily, Disp: 1 g, Rfl: 0    hydrOXYzine HCL (ATARAX) 10 mg tablet, Take 1 tablet (10 mg total) by mouth daily as needed for itching, Disp: 30 tablet, Rfl: 3    IRON, FERROUS SULFATE, PO, Take by mouth every other day , Disp: , Rfl:     mometasone (NASONEX) 50 mcg/act nasal spray, SHAKE LIQUID AND USE 2 SPRAYS IN EACH NOSTRIL DAILY, Disp: 17 g, Rfl: 3    Pseudoephedrine HCl (SUDAFED PO), Take by mouth as needed, Disp: , Rfl:     traMADol (ULTRAM) 50 mg tablet, Take 1 tablet (50 mg total) by mouth every 8 (eight) hours as needed for moderate pain, Disp: 30 tablet, Rfl: 0    zolpidem (AMBIEN) 10 mg tablet, Take 1 tablet (10 mg total) by mouth daily at bedtime as needed for sleep 1/2 to 1 PRN, Disp: 30 tablet, Rfl: 0      Recent Results (from the past  4 weeks)   CBC and differential    Collection Time: 01/28/25  9:48 AM   Result Value Ref Range    White Blood Cell Count 3.4 3.4 - 10.8 x10E3/uL    Red Blood Cell Count 5.15 4.14 - 5.80 x10E6/uL    Hemoglobin 15.5 13.0 - 17.7 g/dL    HCT 45.4 37.5 - 51.0 %    MCV 88 79 - 97 fL    MCH 30.1 26.6 - 33.0 pg    MCHC 34.1 31.5 - 35.7 g/dL    RDW 13.2 11.6 - 15.4 %    Platelet Count 163 150 - 450 x10E3/uL    Neutrophils 54 Not Estab. %    Lymphocytes 30 Not Estab. %    Monocytes 7 Not Estab. %    Eosinophils 7 Not Estab. %    Basophils PCT 2 Not Estab. %    Neutrophils (Absolute) 1.9 1.4 - 7.0 x10E3/uL    Lymphocytes (Absolute) 1.0 0.7 - 3.1 x10E3/uL    Monocytes (Absolute) 0.2 0.1 - 0.9 x10E3/uL    Eosinophils (Absolute) 0.3 0.0 - 0.4 x10E3/uL    Basophils ABS 0.1 0.0 - 0.2 x10E3/uL    Immature Granulocytes 0 Not Estab. %    Immature Granulocytes (Absolute) 0.0 0.0 - 0.1 x10E3/uL   Comprehensive metabolic panel    Collection Time: 01/28/25  9:48 AM   Result Value Ref Range    Glucose, Random 99 70 - 99 mg/dL    BUN 14 8 - 27 mg/dL    Creatinine 0.98 0.76 - 1.27 mg/dL    eGFR 80 >59 mL/min/1.73    SL AMB BUN/CREATININE RATIO 14 10 - 24    Sodium 140 134 - 144 mmol/L    Potassium 4.3 3.5 - 5.2 mmol/L    Chloride 101 96 - 106 mmol/L    CO2 24 20 - 29 mmol/L    CALCIUM 9.8 8.6 - 10.2 mg/dL    Protein, Total 6.4 6.0 - 8.5 g/dL    Albumin 4.5 3.8 - 4.8 g/dL    Globulin, Total 1.9 1.5 - 4.5 g/dL    TOTAL BILIRUBIN 0.8 0.0 - 1.2 mg/dL    Alk Phos Isoenzymes 85 44 - 121 IU/L    AST 19 0 - 40 IU/L    ALT 14 0 - 44 IU/L   Lipid panel    Collection Time: 01/28/25  9:48 AM   Result Value Ref Range    Cholesterol, Total 176 100 - 199 mg/dL    Triglycerides 65 0 - 149 mg/dL    HDL 84 >39 mg/dL    VLDL Cholesterol Calculated 12 5 - 40 mg/dL    LDL Calculated 80 0 - 99 mg/dL    T. Chol/HDL Ratio 2.1 0.0 - 5.0 ratio   PSA Total (Reflex To Free)    Collection Time: 01/28/25  9:48 AM   Result Value Ref Range    Prostate Specific Antigen  Total 1.1 0.0 - 4.0 ng/mL    Reflex Criteria Comment

## 2025-02-19 NOTE — PATIENT INSTRUCTIONS
DISCUSSED HEALTH MAINTENENCE ISSUES  BW WILL BE REVIEWED  ENCOURAGED HEALTHY DIET AND EXERCISE  COLONOSCOPY WILL BE ORDERED  RV FOR ANNUAL HEALTH EXAM IN 1 YEAR  RV SOONER IF THERE ARE ANY CONCERNS      Recent Results (from the past 4 weeks)   CBC and differential    Collection Time: 01/28/25  9:48 AM   Result Value Ref Range    White Blood Cell Count 3.4 3.4 - 10.8 x10E3/uL    Red Blood Cell Count 5.15 4.14 - 5.80 x10E6/uL    Hemoglobin 15.5 13.0 - 17.7 g/dL    HCT 45.4 37.5 - 51.0 %    MCV 88 79 - 97 fL    MCH 30.1 26.6 - 33.0 pg    MCHC 34.1 31.5 - 35.7 g/dL    RDW 13.2 11.6 - 15.4 %    Platelet Count 163 150 - 450 x10E3/uL    Neutrophils 54 Not Estab. %    Lymphocytes 30 Not Estab. %    Monocytes 7 Not Estab. %    Eosinophils 7 Not Estab. %    Basophils PCT 2 Not Estab. %    Neutrophils (Absolute) 1.9 1.4 - 7.0 x10E3/uL    Lymphocytes (Absolute) 1.0 0.7 - 3.1 x10E3/uL    Monocytes (Absolute) 0.2 0.1 - 0.9 x10E3/uL    Eosinophils (Absolute) 0.3 0.0 - 0.4 x10E3/uL    Basophils ABS 0.1 0.0 - 0.2 x10E3/uL    Immature Granulocytes 0 Not Estab. %    Immature Granulocytes (Absolute) 0.0 0.0 - 0.1 x10E3/uL   Comprehensive metabolic panel    Collection Time: 01/28/25  9:48 AM   Result Value Ref Range    Glucose, Random 99 70 - 99 mg/dL    BUN 14 8 - 27 mg/dL    Creatinine 0.98 0.76 - 1.27 mg/dL    eGFR 80 >59 mL/min/1.73    SL AMB BUN/CREATININE RATIO 14 10 - 24    Sodium 140 134 - 144 mmol/L    Potassium 4.3 3.5 - 5.2 mmol/L    Chloride 101 96 - 106 mmol/L    CO2 24 20 - 29 mmol/L    CALCIUM 9.8 8.6 - 10.2 mg/dL    Protein, Total 6.4 6.0 - 8.5 g/dL    Albumin 4.5 3.8 - 4.8 g/dL    Globulin, Total 1.9 1.5 - 4.5 g/dL    TOTAL BILIRUBIN 0.8 0.0 - 1.2 mg/dL    Alk Phos Isoenzymes 85 44 - 121 IU/L    AST 19 0 - 40 IU/L    ALT 14 0 - 44 IU/L   Lipid panel    Collection Time: 01/28/25  9:48 AM   Result Value Ref Range    Cholesterol, Total 176 100 - 199 mg/dL    Triglycerides 65 0 - 149 mg/dL    HDL 84 >39 mg/dL    VLDL  Cholesterol Calculated 12 5 - 40 mg/dL    LDL Calculated 80 0 - 99 mg/dL    T. Chol/HDL Ratio 2.1 0.0 - 5.0 ratio   PSA Total (Reflex To Free)    Collection Time: 01/28/25  9:48 AM   Result Value Ref Range    Prostate Specific Antigen Total 1.1 0.0 - 4.0 ng/mL    Reflex Criteria Comment        Medicare Preventive Visit Patient Instructions  Thank you for completing your Welcome to Medicare Visit or Medicare Annual Wellness Visit today. Your next wellness visit will be due in one year (2/20/2026).  The screening/preventive services that you may require over the next 5-10 years are detailed below. Some tests may not apply to you based off risk factors and/or age. Screening tests ordered at today's visit but not completed yet may show as past due. Also, please note that scanned in results may not display below.  Preventive Screenings:  Service Recommendations Previous Testing/Comments   Colorectal Cancer Screening  Colonoscopy    Fecal Occult Blood Test (FOBT)/Fecal Immunochemical Test (FIT)  Fecal DNA/Cologuard Test  Flexible Sigmoidoscopy Age: 45-75 years old   Colonoscopy: every 10 years (May be performed more frequently if at higher risk)  OR  FOBT/FIT: every 1 year  OR  Cologuard: every 3 years  OR  Sigmoidoscopy: every 5 years  Screening may be recommended earlier than age 45 if at higher risk for colorectal cancer. Also, an individualized decision between you and your healthcare provider will decide whether screening between the ages of 76-85 would be appropriate. Colonoscopy: 08/18/2015  FOBT/FIT: Not on file  Cologuard: Not on file  Sigmoidoscopy: Not on file          Prostate Cancer Screening Individualized decision between patient and health care provider in men between ages of 55-69   Medicare will cover every 12 months beginning on the day after your 50th birthday PSA: 1.1 ng/mL     Screening Not Indicated     Hepatitis C Screening Once for adults born between 1945 and 1965  More frequently in patients at  high risk for Hepatitis C Hep C Antibody: 01/19/2021    Screening Current   Diabetes Screening 1-2 times per year if you're at risk for diabetes or have pre-diabetes Fasting glucose: No results in last 5 years (No results in last 5 years)  A1C: No results in last 5 years (No results in last 5 years)  Screening Current   Cholesterol Screening Once every 5 years if you don't have a lipid disorder. May order more often based on risk factors. Lipid panel: 01/28/2025  Screening Not Indicated  History Lipid Disorder      Other Preventive Screenings Covered by Medicare:  Abdominal Aortic Aneurysm (AAA) Screening: covered once if your at risk. You're considered to be at risk if you have a family history of AAA or a male between the age of 65-75 who smoking at least 100 cigarettes in your lifetime.  Lung Cancer Screening: covers low dose CT scan once per year if you meet all of the following conditions: (1) Age 55-77; (2) No signs or symptoms of lung cancer; (3) Current smoker or have quit smoking within the last 15 years; (4) You have a tobacco smoking history of at least 20 pack years (packs per day x number of years you smoked); (5) You get a written order from a healthcare provider.  Glaucoma Screening: covered annually if you're considered high risk: (1) You have diabetes OR (2) Family history of glaucoma OR (3)  aged 50 and older OR (4)  American aged 65 and older  Osteoporosis Screening: covered every 2 years if you meet one of the following conditions: (1) Have a vertebral abnormality; (2) On glucocorticoid therapy for more than 3 months; (3) Have primary hyperparathyroidism; (4) On osteoporosis medications and need to assess response to drug therapy.  HIV Screening: covered annually if you're between the age of 15-65. Also covered annually if you are younger than 15 and older than 65 with risk factors for HIV infection. For pregnant patients, it is covered up to 3 times per  pregnancy.    Immunizations:  Immunization Recommendations   Influenza Vaccine Annual influenza vaccination during flu season is recommended for all persons aged >= 6 months who do not have contraindications   Pneumococcal Vaccine   * Pneumococcal conjugate vaccine = PCV13 (Prevnar 13), PCV15 (Vaxneuvance), PCV20 (Prevnar 20)  * Pneumococcal polysaccharide vaccine = PPSV23 (Pneumovax) Adults 19-63 yo with certain risk factors or if 65+ yo  If never received any pneumonia vaccine: recommend Prevnar 20 (PCV20)  Give PCV20 if previously received 1 dose of PCV13 or PPSV23   Hepatitis B Vaccine 3 dose series if at intermediate or high risk (ex: diabetes, end stage renal disease, liver disease)   Respiratory syncytial virus (RSV) Vaccine - COVERED BY MEDICARE PART D  * RSVPreF3 (Arexvy) CDC recommends that adults 60 years of age and older may receive a single dose of RSV vaccine using shared clinical decision-making (SCDM)   Tetanus (Td) Vaccine - COST NOT COVERED BY MEDICARE PART B Following completion of primary series, a booster dose should be given every 10 years to maintain immunity against tetanus. Td may also be given as tetanus wound prophylaxis.   Tdap Vaccine - COST NOT COVERED BY MEDICARE PART B Recommended at least once for all adults. For pregnant patients, recommended with each pregnancy.   Shingles Vaccine (Shingrix) - COST NOT COVERED BY MEDICARE PART B  2 shot series recommended in those 19 years and older who have or will have weakened immune systems or those 50 years and older     Health Maintenance Due:      Topic Date Due   • Hepatitis C Screening  Completed   • Colorectal Cancer Screening  Discontinued     Immunizations Due:      Topic Date Due   • Influenza Vaccine (1) 09/01/2024   • COVID-19 Vaccine (4 - 2024-25 season) 09/01/2024     Advance Directives   What are advance directives?  Advance directives are legal documents that state your wishes and plans for medical care. These plans are made  ahead of time in case you lose your ability to make decisions for yourself. Advance directives can apply to any medical decision, such as the treatments you want, and if you want to donate organs.   What are the types of advance directives?  There are many types of advance directives, and each state has rules about how to use them. You may choose a combination of any of the following:  Living will:  This is a written record of the treatment you want. You can also choose which treatments you do not want, which to limit, and which to stop at a certain time. This includes surgery, medicine, IV fluid, and tube feedings.   Durable power of  for healthcare (DPAHC):  This is a written record that states who you want to make healthcare choices for you when you are unable to make them for yourself. This person, called a proxy, is usually a family member or a friend. You may choose more than 1 proxy.  Do not resuscitate (DNR) order:  A DNR order is used in case your heart stops beating or you stop breathing. It is a request not to have certain forms of treatment, such as CPR. A DNR order may be included in other types of advance directives.  Medical directive:  This covers the care that you want if you are in a coma, near death, or unable to make decisions for yourself. You can list the treatments you want for each condition. Treatment may include pain medicine, surgery, blood transfusions, dialysis, IV or tube feedings, and a ventilator (breathing machine).  Values history:  This document has questions about your views, beliefs, and how you feel and think about life. This information can help others choose the care that you would choose.  Why are advance directives important?  An advance directive helps you control your care. Although spoken wishes may be used, it is better to have your wishes written down. Spoken wishes can be misunderstood, or not followed. Treatments may be given even if you do not want them. An  advance directive may make it easier for your family to make difficult choices about your care.   Narcotic (Opioid) Safety    Use narcotics safely:  Take prescribed narcotics exactly as directed  Do not give narcotics to others or take narcotics that belong to someone else  Do not mix narcotics without medicines or alcohol  Do not drive or operate heavy machinery after you take the narcotic  Monitor for side effects and notify your healthcare provider if you experienced side effects such as nausea, sleepiness, itching, or trouble thinking clearly.    Manage constipation:    Constipation is the most common side effect of narcotic medicine. Constipation is when you have hard, dry bowel movements, or you go longer than usual between bowel movements. Tell your healthcare provider about all changes in your bowel movements while you are taking narcotics. He or she may recommend laxative medicine to help you have a bowel movement. He or she may also change the kind of narcotic you are taking, or change when you take it. The following are more ways you can prevent or relieve constipation:    Drink liquids as directed.  You may need to drink extra liquids to help soften and move your bowels. Ask how much liquid to drink each day and which liquids are best for you.  Eat high-fiber foods.  This may help decrease constipation by adding bulk to your bowel movements. High-fiber foods include fruits, vegetables, whole-grain breads and cereals, and beans. Your healthcare provider or dietitian can help you create a high-fiber meal plan. Your provider may also recommend a fiber supplement if you cannot get enough fiber from food.  Exercise regularly.  Regular physical activity can help stimulate your intestines. Walking is a good exercise to prevent or relieve constipation. Ask which exercises are best for you.  Schedule a time each day to have a bowel movement.  This may help train your body to have regular bowel movements. Bend  forward while you are on the toilet to help move the bowel movement out. Sit on the toilet for at least 10 minutes, even if you do not have a bowel movement.    Store narcotics safely:   Store narcotics where others cannot easily get them.  Keep them in a locked cabinet or secure area. Do not  keep them in a purse or other bag you carry with you. A person may be looking for something else and find the narcotics.  Make sure narcotics are stored out of the reach of children.  A child can easily overdose on narcotics. Narcotics may look like candy to a small child.    The best way to dispose of narcotics:      The laws vary by country and area. In the [place], the best way is to return the narcotics through a take-back program. This program is offered by the [place] Drug Enforcement Agency ([employer]). The following are options for using the program:  Take the narcotics to a [employer] collection site.  The site is often a law enforcement center. Call your local law enforcement center for scheduled take-back days in your area. You will be given information on where to go if the collection site is in a different location.  Take the narcotics to an approved pharmacy or hospital.  A pharmacy or hospital may be set up as a collection site. You will need to ask if it is a [employer] collection site if you were not directed there. A pharmacy or doctor's office may not be able to take back narcotics unless it is a [employer] site.  Use a mail-back system.  This means you are given containers to put the narcotics into. You will then mail them in the containers.  Use a take-back drop box.  This is a place to leave the narcotics at any time. People and animals will not be able to get into the box. Your local law enforcement agency can tell you where to find a drop box in your area.    Other ways to manage pain:   Ask your healthcare provider about non-narcotic medicines to control pain.  Nonprescription medicines include NSAIDs (such as ibuprofen) and  acetaminophen. Prescription medicines include muscle relaxers, antidepressants, and steroids.  Pain may be managed without any medicines.  Some ways to relieve pain include massage, aromatherapy, or meditation. Physical or occupational therapy may also help.    For more information:   Drug Enforcement Administration  [address] [place] [address]  Phone: 0- 389 - 554-0466  Web Address: https://www.deadiversion[place] [place]drug_disposal/    [place] Food and Drug Administration  [address] [place] [address]  Phone: 5- 105 - 648-6777  Web Address: http://www.fda.gov     © Copyright [employer] 2018 Information is for End User's use only and may not be sold, redistributed or otherwise used for commercial purposes. All illustrations and images included in CareNotes® are the copyrighted property of [employer] or [employer]

## 2025-02-19 NOTE — PROGRESS NOTES
Name: Juan Vargas      : 1950      MRN: [id number]  Encounter Provider: Yifan Stone MD  Encounter Date: 2025   Encounter department: [facility]    Assessment & Plan  Elevated blood-pressure reading without diagnosis of hypertension  STABLE  DENIES ANY CP, SOB, PALPITATIONS, OR HEADACHE  NOTES NO WATER RETENTION  COMPLIANT WITH MEDICATION  NO CONCERNS    - CONTINUE CURRENT TREATMENT PLAN  - MONITOR DIETARY SODIUM INTAKE  - ENCOURAGE PHYSICAL ACTIVITY  - RV 3 MONTHS         Mixed hyperlipidemia  WATCHING CHOLESTEROL INTAKE  COMPLIANT WITH MEDICATION  NO CONCERNS    - CONTINUE TO MONITOR DIETARY CHOL INTAKE  - CONTINUE CURRENT MEDICATION  - ENCOURAGED PHYSICAL ACTIVITY         LPRD (laryngopharyngeal reflux disease)  STABLE  DENIES ANY CP, INDIGESTION, OR COUGH  NOTES NO MELENA OR HEMATOCHEZIA  NO HEMATEMESIS  COMPLIANT WITH MEDICATION  NO CONCERNS    - CONTINUE CURRENT TREATMENT PLAN  - MEDICATION AS PRESCRIBED  - AVOID CAFFEINE, ALCOHOL OR SMOKING         Primary osteoarthritis involving multiple joints  STABLE  DENIES ANY JOINT SWELLING OR REDNESS  JOINT STIFFNESS PRESENT  PAIN MANAGEMENT ADEQUATE    - CONTINUE CURRENT MANAGEMENT  - MEDICATION AS DIRECTED  - CALL / RETURN IF SYMPTOMS WORSEN         Benign prostatic hyperplasia, unspecified whether lower urinary tract symptoms present         Thoracic aortic aneurysm without rupture, unspecified part (HCC)    Orders:  •  CTA chest wo w contrast; Future    Atherosclerosis         Chronic midline low back pain without sciatica    Orders:  •  Ambulatory referral to Spine & Pain Management; Future  •  HYDROcodone-acetaminophen (Norco) 5-325 mg per tablet; Take 1 tablet by mouth every 6 (six) hours as needed for pain Max Daily Amount: 4 tablets    Chronic pain syndrome    Orders:  •  Ambulatory referral to Spine & Pain Management; Future    Continuous opioid dependence (HCC)    Orders:  •  Ambulatory referral to Spine &  Pain Management; Future    Screening for hypothyroidism         Encounter for prostate cancer screening         Colon cancer screening    Orders:  •  POCT hemoccult screening    Medicare annual wellness visit, subsequent           Depression Screening and Follow-up Plan: Patient was screened for depression during today's encounter. They screened negative with a PHQ-9 score of 4.        Preventive health issues were discussed with patient, and age appropriate screening tests were ordered as noted in patient's After Visit Summary. Personalized health advice and appropriate referrals for health education or preventive services given if needed, as noted in patient's After Visit Summary.    History of Present Illness     PATIENT RETURNS FOR ANNUAL WELLNESS EXAM AND ANNUAL EVALUATION OF PATIENT'S MEDICAL ISSUES    INDIVIDUAL MEDICAL ISSUES WITH THEIR CURRENT STATUS, ASSESSMENT AND PLANS ARE LISTED ABOVE             Patient Care Team:  Yifan Stone MD as PCP - General (Family Medicine)    Review of Systems   Constitutional:  Positive for fatigue. Negative for chills and fever.   HENT:  Negative for congestion, ear discharge, ear pain, mouth sores, postnasal drip, sore throat and trouble swallowing.    Eyes:  Negative for pain, discharge and visual disturbance.   Respiratory:  Negative for cough, shortness of breath and wheezing.    Cardiovascular:  Negative for chest pain, palpitations and leg swelling.   Gastrointestinal:  Negative for abdominal distention, abdominal pain, blood in stool, diarrhea and nausea.   Endocrine: Negative for polydipsia, polyphagia and polyuria.   Genitourinary:  Negative for dysuria, frequency, hematuria and urgency.   Musculoskeletal:  Positive for arthralgias, back pain, myalgias and neck pain. Negative for gait problem and joint swelling.   Skin:  Negative for pallor and rash.   Neurological:  Negative for dizziness, syncope, speech difficulty, weakness, light-headedness, numbness and  headaches.   Hematological:  Negative for adenopathy.   Psychiatric/Behavioral:  Negative for behavioral problems, confusion and sleep disturbance. The patient is not nervous/anxious.      Medical History Reviewed by provider this encounter:       Annual Wellness Visit Questionnaire       Health Risk Assessment:   Patient rates overall health as good. Patient feels that their physical health rating is slightly worse. Patient is satisfied with their life. Eyesight was rated as same. Hearing was rated as same. Patient feels that their emotional and mental health rating is same. Patients states they are never, rarely angry. Patient states they are sometimes unusually tired/fatigued. Pain experienced in the last 7 days has been a lot. Patient's pain rating has been 6/10. Patient states that he has experienced no weight loss or gain in last 6 months. Many    Depression Screening:   PHQ-9 Score: 4      Fall Risk Screening:   In the past year, patient has experienced: no history of falling in past year      Home Safety:  Patient does not have trouble with stairs inside or outside of their home. Patient has working smoke alarms and has working carbon monoxide detector. Home safety hazards include: loose rugs on the floor.     Nutrition:   Current diet is Regular.     Medications:   Patient is currently taking over-the-counter supplements. OTC medications include: see medication list. Patient is able to manage medications.     Activities of Daily Living (ADLs)/Instrumental Activities of Daily Living (IADLs):   Walk and transfer into and out of bed and chair?: Yes  Dress and groom yourself?: Yes    Bathe or shower yourself?: Yes    Feed yourself? Yes  Do your laundry/housekeeping?: Yes  Manage your money, pay your bills and track your expenses?: Yes  Make your own meals?: Yes    Do your own shopping?: Yes    Previous Hospitalizations:   Any hospitalizations or ED visits within the last 12 months?: No      Advance Care  Planning:   Living will: Yes    Durable POA for healthcare: Yes    Advanced directive: Yes    Provider agrees with end of life decisions: Yes      Cognitive Screening:   Provider or family/friend/caregiver concerned regarding cognition?: No    PREVENTIVE SCREENINGS      Cardiovascular Screening:    General: Screening Not Indicated and History Lipid Disorder      Diabetes Screening:     General: Screening Current      Prostate Cancer Screening:    General: Screening Not Indicated      Osteoporosis Screening:    General: Screening Not Indicated      Lung Cancer Screening:     General: Screening Not Indicated      Hepatitis C Screening:    General: Screening Current    Screening, Brief Intervention, and Referral to Treatment (SBIRT)     Screening  Typical number of drinks in a day: 0  Typical number of drinks in a week: 2  Interpretation: Low risk drinking behavior.    AUDIT-C Screenin) How often did you have a drink containing alcohol in the past year? 2 to 3 times a week  2) How many drinks did you have on a typical day when you were drinking in the past year? 1 to 2  3) How often did you have 6 or more drinks on one occasion in the past year? never    AUDIT-C Score: 3  Interpretation: Score 0-3 (male): Negative screen for alcohol misuse    Single Item Drug Screening:  How often have you used an illegal drug (including marijuana) or a prescription medication for non-medical reasons in the past year? never    Single Item Drug Screen Score: 0  Interpretation: Negative screen for possible drug use disorder    Review of Current Opioid Use    Opioid Risk Tool (ORT) Interpretation: Complete Opioid Risk Tool (ORT)    Social Drivers of Health     Financial Resource Strain: Low Risk  (2024)    Overall Financial Resource Strain (CARDIA)    • Difficulty of Paying Living Expenses: Not very hard   Food Insecurity: No Food Insecurity (2025)    Hunger Vital Sign    • Worried About Running Out of Food in the Last  "Year: Never true    • Ran Out of Food in the Last Year: Never true   Transportation Needs: No Transportation Needs (2/19/2025)    PRAPARE - Transportation    • Lack of Transportation (Medical): No    • Lack of Transportation (Non-Medical): No   Housing Stability: Low Risk  (2/19/2025)    Housing Stability Vital Sign    • Unable to Pay for Housing in the Last Year: No    • Number of Times Moved in the Last Year: 0    • Homeless in the Last Year: No   Utilities: Not At Risk (2/19/2025)    [facility] Utilities    • Threatened with loss of utilities: No     No results found.    Objective   Pulse 83   Temp (!) 95.5 °F (35.3 °C) (Temporal)   Resp 14   Ht 5' 6.5\" (1.689 m)   Wt 69.9 kg (154 lb)   SpO2 97%   BMI 24.48 kg/m²     Physical Exam  Constitutional:       General: He is not in acute distress.     Appearance: Normal appearance. He is well-developed. He is not ill-appearing or diaphoretic.   HENT:      Head: Normocephalic and atraumatic.      Right Ear: Tympanic membrane and external ear normal.      Left Ear: Tympanic membrane and external ear normal.      Nose: Nose normal.      Mouth/Throat:      Mouth: Mucous membranes are moist.      Pharynx: No oropharyngeal exudate.   Eyes:      General: No scleral icterus.        Right eye: No discharge.         Left eye: No discharge.      Conjunctiva/sclera: Conjunctivae normal.      Pupils: Pupils are equal, round, and reactive to light.   Neck:      Thyroid: No thyromegaly.      Vascular: No JVD.      Trachea: No tracheal deviation.   Cardiovascular:      Rate and Rhythm: Normal rate and regular rhythm.      Heart sounds: Normal heart sounds. No murmur heard.     No friction rub. No gallop.   Pulmonary:      Effort: Pulmonary effort is normal. No respiratory distress.      Breath sounds: Normal breath sounds. No stridor. No wheezing or rales.   Chest:      Chest wall: No tenderness.   Abdominal:      General: Bowel sounds are normal. There is no distension.      " Palpations: Abdomen is soft. There is no mass.      Tenderness: There is no abdominal tenderness. There is no guarding or rebound.      Hernia: No hernia is present.   Genitourinary:     Penis: Normal. No tenderness.       Testes: Normal.      Prostate: Normal.      Rectum: Normal. Guaiac result negative.   Musculoskeletal:         General: No tenderness or deformity.      Cervical back: Normal range of motion and neck supple.      Comments: MODERATE DJD CHANGES   Lymphadenopathy:      Cervical: No cervical adenopathy.   Skin:     General: Skin is warm and dry.      Coloration: Skin is not pale.      Findings: No erythema or rash.   Neurological:      General: No focal deficit present.      Mental Status: He is alert and oriented to person, place, and time.      Cranial Nerves: No cranial nerve deficit.      Sensory: No sensory deficit.      Motor: No weakness or abnormal muscle tone.      Coordination: Coordination normal.      Gait: Gait normal.      Deep Tendon Reflexes: Reflexes normal.   Psychiatric:         Mood and Affect: Mood normal.         Behavior: Behavior normal.         Thought Content: Thought content normal.         Judgment: Judgment normal.

## 2025-02-19 NOTE — ASSESSMENT & PLAN NOTE
Orders:  •  Ambulatory referral to Spine & Pain Management; Future  •  HYDROcodone-acetaminophen (Norco) 5-325 mg per tablet; Take 1 tablet by mouth every 6 (six) hours as needed for pain Max Daily Amount: 4 tablets

## 2025-02-20 ENCOUNTER — TELEPHONE (OUTPATIENT)
Dept: OTHER | Facility: OTHER | Age: 75
End: 2025-02-20

## 2025-02-20 NOTE — TELEPHONE ENCOUNTER
Patient called stated that he had a doctor's visit yesterday with Dr. Stone. Patient stated that him and Dr. Stone had discus him doing therapy and possible prescribing him  prednisone for his dislocated thumb.Patient is asking if Dr. Stone can prescribe him the prednisone and he is not able to the therapy until after three weeks.

## 2025-02-21 DIAGNOSIS — M79.646 THUMB PAIN, UNSPECIFIED LATERALITY: Primary | ICD-10-CM

## 2025-02-21 RX ORDER — PREDNISONE 20 MG/1
TABLET ORAL
Qty: 14 TABLET | Refills: 0 | Status: SHIPPED | OUTPATIENT
Start: 2025-02-21

## 2025-02-23 DIAGNOSIS — G47.9 SLEEP DISORDER: ICD-10-CM

## 2025-02-23 DIAGNOSIS — T84.498S FAILED ORTHOPEDIC IMPLANT, SEQUELA: ICD-10-CM

## 2025-02-23 NOTE — TELEPHONE ENCOUNTER
Medication Refill Request       Medication: traMADol (ULTRAM) 50 mg tablet     Dose/Frequency:     Take 1 tablet (50 mg total) by mouth every 8 (eight) hours as needed for moderate pain       Quantity: 30    Pharmacy: [id number] [place] [address]     Office:   [x] PCP/Provider -   [] Specialty/Provider -     Does the patient have enough for 3 days?   [] Yes   [x] No - Send as HP to POD    Is the patient completely out of the medication or does not have enough until the next business day?  [] Yes - send to [facility]  [x] No - Send as HP to POD    Medication Refill Request       Medication: zolpidem (AMBIEN) 10 mg tablet     Dose/Frequency:     Take 1 tablet (10 mg total) by mouth daily at bedtime as needed for sleep 1/2 to 1 PRN       Quantity: 30    [facility]acy[id number] [place] [address]     Office:   [x] PCP/Provider -   [] Specialty/Provider -     Does the patient have enough for 3 days?   [] Yes   [x] No - Send as HP to POD    Is the patient completely out of the medication or does not have enough until the next business day?  [] Yes - send to Call Hub  [x] No - Send as HP to POD

## 2025-02-25 ENCOUNTER — SOCIAL WORK (OUTPATIENT)
Age: 75
End: 2025-02-25

## 2025-02-25 DIAGNOSIS — F43.21 ADJUSTMENT DISORDER WITH DEPRESSED MOOD: Primary | ICD-10-CM

## 2025-02-25 PROCEDURE — 90837 PSYTX W PT 60 MINUTES: CPT | Performed by: SOCIAL WORKER

## 2025-02-25 RX ORDER — TRAMADOL HYDROCHLORIDE 50 MG/1
50 TABLET ORAL EVERY 8 HOURS PRN
Qty: 30 TABLET | Refills: 0 | Status: SHIPPED | OUTPATIENT
Start: 2025-02-25

## 2025-02-25 RX ORDER — ZOLPIDEM TARTRATE 10 MG/1
10 TABLET ORAL
Qty: 30 TABLET | Refills: 0 | Status: SHIPPED | OUTPATIENT
Start: 2025-02-25

## 2025-02-25 NOTE — PSYCH
This note was not shared with the patient due to this is a psychotherapy note    Behavioral Health Psychotherapy Progress Note    Psychotherapy Provided: Individual Psychotherapy     1. Adjustment disorder with depressed mood            DATA:   Juan is a 74-year old,  male who presented for a follow-up outpatient individual counseling session after an approximate two-weeks service gap.   Prior to today's session, on February 19, 2025, Juan had an office visit with his primary care physician for an Annual Wellness Exam.   A review of Juan's medical record revealed that Juan has chronic pain syndrome, opioid dependence, atherosclerosis, thoracic aortic aneurysm without rupture, benign prostatic hypoplasia, primary osteoarthritis involving multiple joints, LPRD, mixed hyperlipidemia, elevated blow pressure without diagnosis of hypertension.   For today's session with the undersigned therapist, Juan feels depressed and stressed.   His depression level is less since the onset of outpatient counseling services with the undersigned therapist.   Juan continues to struggle managing a multitude of psychosocial stressors including but not limited to financial stressors, marital discord, his adopted daughter's mental health issues.   The undersigned therapist assisted Juan with developing effective mood management strategies and skills.   The undersigned therapist provided Juan with psychoeducation regarding the five personality types focusing on discussing neurotic personality type.     During this session, this clinician used the following therapeutic modalities: Cognitive Behavioral Therapy and Supportive Psychotherapy    Substance Abuse was not addressed during this session. If the client is diagnosed with a co-occurring substance use disorder, please indicate any changes in the frequency or amount of use: NA. Stage of change for addressing substance use diagnoses: No substance use/Not applicable    ASSESSMENT:  Juan  "Alicia presents with a Dysthymic mood.    His affect is Normal range and intensity, which is congruent, with his mood and the content of the session. The client has not made progress on their goals.     Juan Vargas presents with a none risk of suicide, none risk of self-harm, and none risk of harm to others.    For any risk assessment that surpasses a \"low\" rating, a safety plan must be developed.    A safety plan was indicated: no  If yes, describe in detail:  We have discussed their safety plan and Juan agrees that if they experience unsafe thoughts that they will reach out to their supports including this office, the suicide hotline, and emergency services if necessary. Juan is aware of non-emergent and emergent mental health resources.    Depression Follow-up Plan Completed: No    Visit start and stop times:    02/25/25  Start Time: 0805  End Time:  9:00 AM  "

## 2025-03-10 ENCOUNTER — SOCIAL WORK (OUTPATIENT)
Age: 75
End: 2025-03-10

## 2025-03-10 DIAGNOSIS — F43.21 ADJUSTMENT DISORDER WITH DEPRESSED MOOD: Primary | ICD-10-CM

## 2025-03-10 PROCEDURE — 90837 PSYTX W PT 60 MINUTES: CPT | Performed by: SOCIAL WORKER

## 2025-03-10 NOTE — PSYCH
"This note was not shared with the patient due to this is a psychotherapy note    Behavioral Health Psychotherapy Progress Note    Psychotherapy Provided: Individual Psychotherapy     1. Adjustment disorder with depressed mood            DATA:  Juan is a 74-year old,  male who presented for a follow-up outpatient individual counseling session after an approximate two-weeks service gap.   Discussed the relationship between Juan's chronic medical problems and his mental health.   The undersigned therapist assisted Juan process his anger and frustration with his daughter's noncompliance with completing household chores.   The undersigned therapist assisted Juan explore his feelings about his wife undermining his parenting of their daughter.   Juan feels depressed and stressed.    There was no evidence of a thought disorder or psychosis.  He denied suicidal ideation, gesture or plan.    During this session, this clinician used the following therapeutic modalities: Supportive Psychotherapy and family systems theory    Substance Abuse was not addressed during this session. If the client is diagnosed with a co-occurring substance use disorder, please indicate any changes in the frequency or amount of use: NA. Stage of change for addressing substance use diagnoses: No substance use/Not applicable    ASSESSMENT:  Juan Vargas presents with a  stressed and overwhelmed  mood.    His affect is Normal range and intensity, which is congruent, with his mood and the content of the session. The client has not made progress on their goals.     Juan Vargas presents with a none risk of suicide, none risk of self-harm, and none risk of harm to others.    For any risk assessment that surpasses a \"low\" rating, a safety plan must be developed.    A safety plan was indicated: no  If yes, describe in detail:  We have discussed their safety plan and Juan agrees that if they experience unsafe thoughts that they will reach out to their " supports including this office, the suicide hotline, and emergency services if necessary. Juan is aware of non-emergent and emergent mental health resources.    Depression Follow-up Plan Completed: No    Visit start and stop times:    03/10/25  Start time:  8:02 AM  End time:  9:09 AM

## 2025-03-18 DIAGNOSIS — R35.1 BENIGN PROSTATIC HYPERPLASIA WITH NOCTURIA: ICD-10-CM

## 2025-03-18 DIAGNOSIS — N40.1 BENIGN PROSTATIC HYPERPLASIA WITH NOCTURIA: ICD-10-CM

## 2025-03-19 NOTE — TELEPHONE ENCOUNTER
Patient called today regardinmg his Medication   alfuzosin (UROXATRAL) 10 mg 24 hr tablet, Please call Patient back to discuss.

## 2025-03-19 NOTE — TELEPHONE ENCOUNTER
I called Demarco to get more info.  Demarco states he spoke to Dr Stone about doubling up Alfuzosin 10mg, but states he never got an answer from Dr Stone.  eDmarco would like to take 10mg 2x a day.  Demarco is asking if that is ok?

## 2025-03-20 NOTE — TELEPHONE ENCOUNTER
Informed Juan of Dr Stone's response.  Juan states his understanding and asked for a refill to be called into [facility]

## 2025-03-21 PROBLEM — Z00.00 MEDICARE ANNUAL WELLNESS VISIT, SUBSEQUENT: Status: RESOLVED | Noted: 2025-02-19 | Resolved: 2025-03-21

## 2025-03-21 RX ORDER — ALFUZOSIN HYDROCHLORIDE 10 MG/1
10 TABLET, EXTENDED RELEASE ORAL DAILY
Qty: 90 TABLET | Refills: 0 | Status: SHIPPED | OUTPATIENT
Start: 2025-03-21

## 2025-03-30 DIAGNOSIS — T84.498S FAILED ORTHOPEDIC IMPLANT, SEQUELA: ICD-10-CM

## 2025-03-30 DIAGNOSIS — G47.9 SLEEP DISORDER: ICD-10-CM

## 2025-03-30 NOTE — TELEPHONE ENCOUNTER
Medication Refill Request     Both medications he has 2 pills      Medication:     zolpidem (AMBIEN) 10 mg tablet       Dose/Frequency: malik 1 tablet (10 mg total) by mouth daily at bedtime as needed for sleep 1/2 to 1 PRN     Quantity: 30 tablet     Pharmacy:  [facility] #[id number] [place], [place]      Office:   [x] PCP/Provider -   [] Specialty/Provider -     Does the patient have enough for 3 days?   [] Yes   [x] No - Send as HP to POD    Is the patient completely out of the medication or does not have enough until the next business day?  [] Yes - send to Call Hub  [x] No - Send as HP to POD    Medication Refill Request       Medication:     traMADol (ULTRAM) 50 mg tablet       Dose/Frequency: Take 1 tablet (50 mg total) by mouth every 8 (eight) hours as needed for moderate pain     Quantity: 30 tablet     Pharmacy: [facility] [id number] [place], [place]     Office:   [x] PCP/Provider -   [] Specialty/Provider -     Does the patient have enough for 3 days?   [] Yes   [x] No - Send as HP to POD    Is the patient completely out of the medication or does not have enough until the next business day?  [] Yes - send to Call Hub  [x] No - Send as HP to POD

## 2025-03-31 RX ORDER — ZOLPIDEM TARTRATE 10 MG/1
10 TABLET ORAL
Qty: 30 TABLET | Refills: 0 | Status: SHIPPED | OUTPATIENT
Start: 2025-03-31

## 2025-03-31 RX ORDER — TRAMADOL HYDROCHLORIDE 50 MG/1
50 TABLET ORAL EVERY 8 HOURS PRN
Qty: 30 TABLET | Refills: 0 | Status: SHIPPED | OUTPATIENT
Start: 2025-03-31

## 2025-04-01 ENCOUNTER — SOCIAL WORK (OUTPATIENT)
Age: 75
End: 2025-04-01

## 2025-04-01 DIAGNOSIS — F43.21 ADJUSTMENT DISORDER WITH DEPRESSED MOOD: Primary | ICD-10-CM

## 2025-04-01 PROCEDURE — 90837 PSYTX W PT 60 MINUTES: CPT | Performed by: SOCIAL WORKER

## 2025-04-01 NOTE — PSYCH
"This note was not shared with the patient due to this is a psychotherapy note    Behavioral Health Psychotherapy Progress Note    Psychotherapy Provided: Individual Psychotherapy     1. Adjustment disorder with depressed mood            DATA:   Juan is a 74-year old,  male who presented for a follow-up outpatient individual counseling session after an approximate three-weeks service gap as the undersigned therapist cancelled one session due to illness and Juan cancelled at least one previously-scheduled session.    Juan has an active lifestyle.   It is difficult for Juan to relax and \"do nothing\" when he's home.   He does complain of needing more energy to complete his goals and objectives for the day.   Juan has a significant history of drug use.   He is abstaining from drugs for many years.   Juan admits if cocaine was offered to him, he'd strongly consider using it again on a one time basis.   The undersigned therapist provided Juan with substance abuse education and prevention related counseling services.    The undersigned therapist assisted Juan process his decision to stay in his marriage despite his dissatisfaction with his relationship.   Juan claims he isn't in love with his wife but loves her despite not liking her.      During this session, this clinician used the following therapeutic modalities: Supportive Psychotherapy    Substance Abuse was addressed during this session. If the client is diagnosed with a co-occurring substance use disorder, please indicate any changes in the frequency or amount of use: Juan is in recovery from drug use. Stage of change for addressing substance use diagnoses: Maintenance    ASSESSMENT:  Juan Vargas presents with a Dysthymic mood.    His affect is Normal range and intensity, which is congruent, with his mood and the content of the session. The client has not made progress on their goals.     Juan Vargas presents with a none risk of suicide, none risk of " "self-harm, and none risk of harm to others.    For any risk assessment that surpasses a \"low\" rating, a safety plan must be developed.    A safety plan was indicated: no  If yes, describe in detail:  We have discussed their safety plan and Juan agrees that if they experience unsafe thoughts that they will reach out to their supports including this office, the suicide hotline, and emergency services if necessary. Juan is aware of non-emergent and emergent mental health resources.    Depression Follow-up Plan Completed: No    Visit start and stop times:    04/01/25  Start Time: 0804  End Time:  9:00 AM  "

## 2025-04-08 ENCOUNTER — SOCIAL WORK (OUTPATIENT)
Age: 75
End: 2025-04-08

## 2025-04-08 DIAGNOSIS — F43.21 ADJUSTMENT DISORDER WITH DEPRESSED MOOD: Primary | ICD-10-CM

## 2025-04-08 PROCEDURE — 90834 PSYTX W PT 45 MINUTES: CPT | Performed by: SOCIAL WORKER

## 2025-04-08 NOTE — PSYCH
This note was not shared with the patient due to this is a psychotherapy note    Behavioral Health Psychotherapy Progress Note    Psychotherapy Provided: Individual Psychotherapy     1. Adjustment disorder with depressed mood            DATA:   Juan is a 74-year old,  male who presented for a follow-up outpatient individual counseling session.   The start of today's session was delayed by 20 minutes as the undersigned therapist was not informed Juan was in the waiting room and his appointment erroneously appeared cancelled on the undersigned therapist's schedule.  Juan continues to struggle coping with a multitude of psychosocial stressors including, financial problems, care taking duties for his developmentally delayed and mentally ill adoptive adult daughter, and long-term marital problems.   One of the primary reasons that Juan has elected to stay in his marriage is feeling obligated to take care of wife and their daughter if his wife's cancer reoccurs.   Juan is a very active person and works throughout the week.   It is hard for Juan to relax and sit still.   Juan has a history of substance use and is currently sober and in recovery.   Juan's depression and anxiety has lessened since the onset of outpatient counseling services with the undersigned therapist.   There was no evidence of a thought disorder or psychosis.   Juan denied suicidal ideation, gesture or plan.  The undersigned therapist assisted Juan develop effective stress and mood management skills by focusing on cognitive restructuring.  The undersigned therapist assisted Juan with developing effective conflict resolution skills secondary to conflict with his wife and daughter.     During this session, this clinician used the following therapeutic modalities: Cognitive Behavioral Therapy and family systems theory    Substance Abuse was not addressed during this session. If the client is diagnosed with a co-occurring substance use disorder,  "please indicate any changes in the frequency or amount of use: Juan is in recovery from drug use. Stage of change for addressing substance use diagnoses: Maintenance    ASSESSMENT:  Juan Vargas presents with a Dysthymic and stressed  mood.    His affect is Normal range and intensity, which is congruent, with his mood and the content of the session. The client has not made progress on their goals.     Juan Vargas presents with a none risk of suicide, none risk of self-harm, and none risk of harm to others.    For any risk assessment that surpasses a \"low\" rating, a safety plan must be developed.    A safety plan was indicated: no  If yes, describe in detail:  We have discussed their safety plan and Juan agrees that if they experience unsafe thoughts that they will reach out to their supports including this office, the suicide hotline, and emergency services if necessary. Juan is aware of non-emergent and emergent mental health resources.    Depression Follow-up Plan Completed: No    Visit start and stop times:    04/08/25  Start Time: 0822  End Time:  9:07 AM  "

## 2025-04-14 ENCOUNTER — TELEPHONE (OUTPATIENT)
Age: 75
End: 2025-04-14

## 2025-04-14 NOTE — TELEPHONE ENCOUNTER
Vm on appt line:    Good morning. My name is Demarco. My last name is Leeanna D as in Leonardo CEDENO, as in Alena Durán birthday May 11, 1950. I've got an 8:00 appointment tomorrow morning with Akhil Ingram. I just wish to confirm that I do apologize that I had text messages and e-mail messages and I inadvertently delivered deleted stuff and never really put anything onto my personal calendar. So I'd appreciate a phone call, [phone], [phone]. It is approximately 11:30 in the morning on Monday. I'd appreciate a phone call, earliest convenience. Thank you very much. Laure.  You received a voice mail from [phone]    Attempted to contact patient, no answer, left david.

## 2025-04-15 ENCOUNTER — SOCIAL WORK (OUTPATIENT)
Age: 75
End: 2025-04-15

## 2025-04-15 DIAGNOSIS — F43.21 ADJUSTMENT DISORDER WITH DEPRESSED MOOD: Primary | ICD-10-CM

## 2025-04-15 PROCEDURE — 90837 PSYTX W PT 60 MINUTES: CPT | Performed by: SOCIAL WORKER

## 2025-04-15 NOTE — PSYCH
This note was not shared with the patient due to this is a psychotherapy note    Behavioral Health Psychotherapy Progress Note    Psychotherapy Provided: Individual Psychotherapy     1. Adjustment disorder with depressed mood            DATA:   Juan is a 74-year old,  male who presented for a follow-up outpatient individual counseling session.   Juan continues to struggle coping with stressors regarding his care taking duties for his mentally ill and developmentally disabled adult adoptive daughter.   He describes his daughter's intelligence as low.   The undersigned therapist provided Juan with psychoeducation regarding borderline intellectual functioning.  Focused on discussing the characteristics of borderline intellectual functioning including the concepts of parroting, difficulty to adapting to changes or learning new skills, difficulty with achieving independent living, executing daily life activities, participating in social situations, difficulty managing emotions and aggression, mood swings, and low frustration tolerance.  The undersigned therapist assisted Juan with processing his difficulty co-parenting with his wife.   Discussed inviting his wife to a future counseling session with the undersigned therapist.   Juan's depression and anxiety appear well-controlled at the moment.   There was no evidence of a thought disorder or psychosis.   He denied suicidal ideation, gesture or plan.    During this session, this clinician used the following therapeutic modalities: Supportive Psychotherapy and family systems theory    Substance Abuse was not addressed during this session. If the client is diagnosed with a co-occurring substance use disorder, please indicate any changes in the frequency or amount of use: Juan abstains from use of illicit drugs and alcohol. Stage of change for addressing substance use diagnoses: Maintenance    ASSESSMENT:  Juan Vargas presents with a Euthymic/ normal mood.    His  "affect is Normal range and intensity, which is congruent, with his mood and the content of the session. The client has made progress on their goals.     Juan Vargas presents with a none risk of suicide, none risk of self-harm, and none risk of harm to others.    For any risk assessment that surpasses a \"low\" rating, a safety plan must be developed.    A safety plan was indicated: no  If yes, describe in detail: We have discussed their safety plan and Juan agrees that if they experience unsafe thoughts that they will reach out to their supports including this office, the suicide hotline, and emergency services if necessary. Juan is aware of non-emergent and emergent mental health resources.     Depression Follow-up Plan Completed: No    Visit start and stop times:    04/15/25  Start Time: 0805  End Time:  9:00 AM  "

## 2025-04-29 DIAGNOSIS — T84.498S FAILED ORTHOPEDIC IMPLANT, SEQUELA: ICD-10-CM

## 2025-04-29 RX ORDER — TRAMADOL HYDROCHLORIDE 50 MG/1
50 TABLET ORAL EVERY 8 HOURS PRN
Qty: 30 TABLET | Refills: 0 | Status: SHIPPED | OUTPATIENT
Start: 2025-04-29

## 2025-04-29 NOTE — TELEPHONE ENCOUNTER
Reason for call:   [x] Refill   [] Prior Auth  [] Other:     Office:   [x] PCP/Provider -   [] Specialty/Provider -     Medication:   traMADol (ULTRAM) 50 mg tablet    Dose/Frequency: Sig: Take 1 tablet (50 mg total) by mouth every 8 (eight) hours as needed for moderate pain      Quantity: 50    Pharmacy: [id number] [address] [address]  Phone: [phone]  Fax: [phone]    Local Pharmacy   Does the patient have enough for 3 days?   [] Yes   [x] No - Send as HP to POD    Mail Away Pharmacy   Does the patient have enough for 10 days?   [] Yes   [] No - Send as HP to POD

## 2025-04-30 ENCOUNTER — TELEPHONE (OUTPATIENT)
Dept: PSYCHIATRY | Facility: CLINIC | Age: 75
End: 2025-04-30

## 2025-05-13 ENCOUNTER — SOCIAL WORK (OUTPATIENT)
Age: 75
End: 2025-05-13

## 2025-05-13 DIAGNOSIS — F43.21 ADJUSTMENT DISORDER WITH DEPRESSED MOOD: Primary | ICD-10-CM

## 2025-05-13 PROCEDURE — 90837 PSYTX W PT 60 MINUTES: CPT | Performed by: SOCIAL WORKER

## 2025-05-13 NOTE — PSYCH
"This note was not shared with the patient due to this is a psychotherapy note    Behavioral Health Psychotherapy Progress Note    Psychotherapy Provided: Individual Psychotherapy     1. Adjustment disorder with depressed mood            DATA:   Juan is a 75-year old,  male who presented for a follow-up outpatient individual counseling session after an approximate four weeks service gap as he was a no show for two  previously-scheduled sessions with the undersigned therapist.   Juan feels depressed but he appears to be functioning at a high level at home, work, and within the community.  Currently, he rates his level of depression as a \"4\" on a depression severity scale of \"1 to 10\".   It is likely that Juan's lifelong depression was a byproduct of a traumatic childhood and poor decisions he made especially as a young adult.   As an adult, Juan's conflict with his wife, his wife's cancer diagnosis and treatment, and the adoption of his mentally ill and developmentally disabled daughter exacerbated feels of depression and anxiety.   It is likely that he attempted to self-medicate his depression and anxiety with drugs and alcohol at many times during his life.  Juan is a very intelligent and thoughtful person.   There was no evidence of a thought disorder or psychosis.   He denied suicidal ideation, gesture or plan.  Juan also displays symptomology consistent with an ADHD diagnosis probably leading to some of his struggles with decision making as a child and adolescent as well as academic under performance in school.   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   This is contrary to Juan's own political beliefs and this experience typically affects his ability to enjoy the experience of sharing his love of trains with this group of train " "enthusiasts.   Juan admits he struggles to cope with being hurt by other people and this often results in him expressing anger.   The undersigned therapist provided Juan with anger management counseling.    During this session, this clinician used the following therapeutic modalities: Supportive Psychotherapy and family systems theory, and anger management counseling    Substance Abuse was addressed during this session. If the client is diagnosed with a co-occurring substance use disorder, please indicate any changes in the frequency or amount of use: Juan mostly abstains from using substances. Stage of change for addressing substance use diagnoses: Action    ASSESSMENT:  Juan Vargas presents with a Depressed and stressed mood.    His affect is Normal range and intensity, which is congruent, with his mood and the content of the session. The client has made progress on their goals.     Juan Vargas presents with a none risk of suicide, none risk of self-harm, and none risk of harm to others.    For any risk assessment that surpasses a \"low\" rating, a safety plan must be developed.    A safety plan was indicated: no  If yes, describe in detail:  We have discussed their safety plan and Juan agrees that if they experience unsafe thoughts that they will reach out to their supports including this office, the suicide hotline, and emergency services if necessary. Juan is aware of non-emergent and emergent mental health resources.    Depression Follow-up Plan Completed: No    Visit start and stop times:    05/13/25  Start Time: 0805  End Time:  9:00 AM  "

## 2025-05-20 ENCOUNTER — SOCIAL WORK (OUTPATIENT)
Age: 75
End: 2025-05-20

## 2025-05-20 DIAGNOSIS — F43.21 ADJUSTMENT DISORDER WITH DEPRESSED MOOD: Primary | ICD-10-CM

## 2025-05-20 PROCEDURE — 90837 PSYTX W PT 60 MINUTES: CPT | Performed by: SOCIAL WORKER

## 2025-05-20 NOTE — PSYCH
This note was not shared with the patient due to this is a psychotherapy note    Behavioral Health Psychotherapy Progress Note    Psychotherapy Provided: Individual Psychotherapy     1. Adjustment disorder with depressed mood            DATA:   Juan is a 75-year old,  male who presented for a follow-up outpatient individual counseling session.   Juan is struggling in his marriage and with his relationship with his adoptive daughter.   Feeling dissatisfied with these relationships and worried about both parties' medical and mental health, Juan struggles to manage his own depression and stress.   Juan is very active in his halfway.   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   Juan is well informed about the world and politics.   He has recently struggled to cope with his friend's opposing political views.   Juan feels stressed and there is a struggle to manage underlying anger.   There was no evidence of a thought disorder or psychosis.   Juan denied suicidal ideation, gesture or plan.   The undersigned therapist assisted Juan with developing effective mood and anger management strategies by focusing on cognitive restructuring.  The undersigned therapist provided Juan with supportive counseling and encouragement to set age-appropriate boundaries, limits, and consequences secondary to his daughter's acting-out behaviors.    During this session, this clinician used the following therapeutic modalities: Cognitive Behavioral Therapy and family systems theory    Substance Abuse was not addressed during this session. If the client is diagnosed with a co-occurring substance use disorder, please indicate any changes in the frequency or amount of use: NA. Stage of change for addressing substance use diagnoses: No substance use/Not applicable    ASSESSMENT:  Juan Vargas presents with a stressed  "mood.    His affect is Normal range and intensity, which is congruent, with his mood and the content of the session. The client has made progress on their goals.     Juan Vargas presents with a none risk of suicide, none risk of self-harm, and none risk of harm to others.    For any risk assessment that surpasses a \"low\" rating, a safety plan must be developed.    A safety plan was indicated: no  If yes, describe in detail:  We have discussed their safety plan and Juan agrees that if they experience unsafe thoughts that they will reach out to their supports including this office, the suicide hotline, and emergency services if necessary. Juan is aware of non-emergent and emergent mental health resources.    Depression Follow-up Plan Completed: No    Visit start and stop times:    05/20/25  Start Time: 0805  End Time:  9:00 AM  "

## 2025-05-21 ENCOUNTER — OFFICE VISIT (OUTPATIENT)
Dept: FAMILY MEDICINE CLINIC | Facility: CLINIC | Age: 75
End: 2025-05-21
Payer: MEDICARE

## 2025-05-21 VITALS
HEIGHT: 67 IN | BODY MASS INDEX: 23.79 KG/M2 | HEART RATE: 79 BPM | DIASTOLIC BLOOD PRESSURE: 72 MMHG | SYSTOLIC BLOOD PRESSURE: 124 MMHG | OXYGEN SATURATION: 94 % | RESPIRATION RATE: 16 BRPM | WEIGHT: 151.6 LBS | TEMPERATURE: 97 F

## 2025-05-21 DIAGNOSIS — M54.50 CHRONIC MIDLINE LOW BACK PAIN WITHOUT SCIATICA: Primary | ICD-10-CM

## 2025-05-21 DIAGNOSIS — G89.29 CHRONIC MIDLINE LOW BACK PAIN WITHOUT SCIATICA: Primary | ICD-10-CM

## 2025-05-21 DIAGNOSIS — F11.20 CONTINUOUS OPIOID DEPENDENCE (HCC): ICD-10-CM

## 2025-05-21 DIAGNOSIS — E78.2 MIXED HYPERLIPIDEMIA: ICD-10-CM

## 2025-05-21 DIAGNOSIS — R03.0 ELEVATED BLOOD-PRESSURE READING WITHOUT DIAGNOSIS OF HYPERTENSION: ICD-10-CM

## 2025-05-21 DIAGNOSIS — G89.4 CHRONIC PAIN SYNDROME: ICD-10-CM

## 2025-05-21 DIAGNOSIS — M15.0 PRIMARY OSTEOARTHRITIS INVOLVING MULTIPLE JOINTS: ICD-10-CM

## 2025-05-21 PROCEDURE — 96372 THER/PROPH/DIAG INJ SC/IM: CPT

## 2025-05-21 PROCEDURE — 99214 OFFICE O/P EST MOD 30 MIN: CPT | Performed by: FAMILY MEDICINE

## 2025-05-21 RX ORDER — METHYLPREDNISOLONE ACETATE 40 MG/ML
40 INJECTION, SUSPENSION INTRA-ARTICULAR; INTRALESIONAL; INTRAMUSCULAR; SOFT TISSUE ONCE
Status: COMPLETED | OUTPATIENT
Start: 2025-05-21 | End: 2025-05-21

## 2025-05-21 RX ADMIN — METHYLPREDNISOLONE ACETATE 40 MG: 40 INJECTION, SUSPENSION INTRA-ARTICULAR; INTRALESIONAL; INTRAMUSCULAR; SOFT TISSUE at 09:09

## 2025-05-21 NOTE — PATIENT INSTRUCTIONS
CONTINUE CURRENT TREATMENT PLAN  MEDICATION AS DIRECTED  CONSIDER PT / PAIN MANAGEMENT    RV 3M  Goals of care:  Maximize your health and quality of life by:   Increasing your level of function and activity  Decreasing the negative effects of pain on your life  Minimizing the risks and side effects of medications and ensuring safe use of opioid medication     Ways for you to help meet your goals:  Maintain a healthy lifestyle. This includes proper nutrition, regular physical activity as able, try for 8 hours of sleep per night, use stress reduction strategies, avoid triggers.      Risks and side effects of opioid use:  Prescription opioids carry serious risks of addiction and  overdose, especially with prolonged use. An opioid overdose,  often marked by slowed breathing, can cause sudden death. The  use of prescription opioids can have a number of side effects as  well, even when taken as directed:  Tolerance--meaning you might need to take more of a medication for the same pain relief  Physical dependence--meaning you have symptoms of withdrawal when a medication is stopped  Increased sensitivity to pain  Constipation  Nausea, vomiting, dry mouth  Sleepiness and dizziness   Confusion  Depression  Low levels of testosterone that can result in lower sex drive, energy, and strength  Itching and sweating    If you are prescribed opioids for pain:  Never take opioids in greater amounts or more often than prescribed.  Help prevent misuse and abuse.        - Never sell or share prescription opioids.        - Never use another person’s prescription opioids.  ‡Store prescription opioids in a secure place and out of reach of others (this may include visitors, children, friends, and family).  Safely dispose of unused prescription opioids: Find your community drug take-back program or your pharmacy mail-back program, or flush them down the toilet, following guidance from the Food and Drug Administration  (www.fda.gov/Drugs/ResourcesForYou).  ‡Visit www.cdc.gov/drugoverdose to learn about the risks of opioid abuse and overdose.  If you believe you may be struggling with addiction, tell your health care provider and ask for guidance or call [facility]’s National Helpline at 4-891-060-REWN.

## 2025-05-21 NOTE — PROGRESS NOTES
Name: Juan Vargas      : 1950      MRN: [id number]  Encounter Provider: Yifan Stone MD  Encounter Date: 2025   Encounter department: [facility]    :  Assessment & Plan  Chronic midline low back pain without sciatica         Primary osteoarthritis involving multiple joints         Chronic pain syndrome         Continuous opioid dependence (HCC)         Elevated blood-pressure reading without diagnosis of hypertension         Mixed hyperlipidemia         Chronic midline low back pain without sciatica         Primary osteoarthritis involving multiple joints         Chronic pain syndrome         Continuous opioid dependence (HCC)         Elevated blood-pressure reading without diagnosis of hypertension         Mixed hyperlipidemia               Treatment Plan: STRETCHING EXERCISES  PAIN MEDICATION    Treatment Goals: IMPROVED MOBILITY  PAIN RELIEF    Opiate risks  There are risks associated with opioid medications, including dependence, addiction and tolerance. The patient understands and agrees to use these medications only as prescribed. Potential side effects of the medications include, but are not limited to, constipation, drowsiness, addiction, impaired judgment and risk of fatal overdose if not taken as prescribed. The patient was warned against driving while taking sedation medications.  Sharing medications is a felony. At this point in time, the patient is showing no signs of addiction, abuse, diversion or suicidal ideation.      PDMP review  [place] PDMP or [place]  reviewed. No red flags were identified; safe to proceed with prescription      instructions for management and impressions.           History of Present Illness     Opioid Management:   Type of visit: Follow-up    Pain related diagnoses: CHRONIC LOWER LUMBAR PAIN WITH SCIATICA    Interval history: STABLE  PAIN RELIEF ADEQUATE    Aberrant behavior?: No      Adverse effects from medication?: No       Screening Tools/Assessments:    PHQ-2/9:  Last PHQ-9 score: 4 (Last PHQ-9 date: 2/19/2025)      Drug Screen:    Drug screen result based off current prescriptions: consistent    Opioid agreement:  Active Opioid agreement on file?: No    Opioid agreement signed date: 5/15/2024  Opioid agreement expiration date: 5/15/2025    Naloxone:  Currently prescribed Naloxone (Narcan): No      Outpatient Morphine Milligram Equivalents Per Day     5/21/25 and after 35 MME/day    Order Name Dose Route Frequency Maximum MME/Day     HYDROcodone-acetaminophen (Norco) 5-325 mg per tablet 1 tablet Oral Every 6 hours PRN 20 MME/day     traMADol (ULTRAM) 50 mg tablet 50 mg Oral Every 8 hours PRN 15 MME/day    Total Potential Morphine Milligram Equivalents Per Day 35 mg    Calculation Information      HYDROcodone-acetaminophen 5-325 mg Tabs: maximum daily dose of 20 mg of opioid in combo * morphine equivalence factor of 1 = 20 MME/day             traMADol 50 mg Tabs: single dose of 50 mg * 3 doses per day * morphine equivalence factor of 0.1 = 15 MME/day                                 PDMP Review       Value Time User    PDMP Reviewed  Yes 4/29/2025  8:02 PM Yifan Stone MD         Review of Systems   Constitutional:  Negative for chills, fatigue and fever.   HENT:  Negative for congestion, ear discharge, ear pain, mouth sores, postnasal drip, sore throat and trouble swallowing.    Eyes:  Negative for pain, discharge and visual disturbance.   Respiratory:  Negative for cough, shortness of breath and wheezing.    Cardiovascular:  Negative for chest pain, palpitations and leg swelling.   Gastrointestinal:  Negative for abdominal distention, abdominal pain, blood in stool, diarrhea and nausea.   Endocrine: Negative for polydipsia, polyphagia and polyuria.   Genitourinary:  Negative for dysuria, frequency, hematuria and urgency.   Musculoskeletal:  Positive for arthralgias. Negative for gait problem and joint swelling.   Skin:   Negative for pallor and rash.   Neurological:  Negative for dizziness, syncope, speech difficulty, weakness, light-headedness, numbness and headaches.   Hematological:  Negative for adenopathy.   Psychiatric/Behavioral:  Negative for behavioral problems, confusion and sleep disturbance. The patient is not nervous/anxious.      Objective   There were no vitals taken for this visit.    Physical Exam  Vitals reviewed.   Constitutional:       General: He is not in acute distress.     Appearance: Normal appearance. He is well-developed. He is not ill-appearing.   HENT:      Head: Normocephalic and atraumatic.      Nose: Nose normal.      Mouth/Throat:      Mouth: Mucous membranes are moist.     Eyes:      General:         Right eye: No discharge.         Left eye: No discharge.      Conjunctiva/sclera: Conjunctivae normal.      Pupils: Pupils are equal, round, and reactive to light.     Neck:      Thyroid: No thyromegaly.      Vascular: No JVD.     Cardiovascular:      Rate and Rhythm: Normal rate and regular rhythm.      Heart sounds: Normal heart sounds. No murmur heard.  Pulmonary:      Effort: Pulmonary effort is normal.      Breath sounds: Normal breath sounds. No wheezing or rales.   Abdominal:      General: Bowel sounds are normal.      Palpations: Abdomen is soft. There is no mass.      Tenderness: There is no abdominal tenderness. There is no guarding or rebound.     Musculoskeletal:         General: No tenderness or deformity.      Cervical back: Neck supple.      Comments: MODERATE DJD CHANGES   Lymphadenopathy:      Cervical: No cervical adenopathy.     Skin:     General: Skin is warm and dry.      Findings: No erythema or rash.     Neurological:      General: No focal deficit present.      Mental Status: He is alert and oriented to person, place, and time.     Psychiatric:         Mood and Affect: Mood normal.         Behavior: Behavior normal.         Thought Content: Thought content normal.          Judgment: Judgment normal.

## 2025-05-26 LAB
4OH-XYLAZINE UR QL CFM: NEGATIVE NG/ML
6MAM UR QL CFM: NEGATIVE NG/ML
7AMINOCLONAZEPAM UR QL CFM: NEGATIVE NG/ML
A-OH ALPRAZ UR QL CFM: NEGATIVE NG/ML
ACCEPTABLE CREAT UR QL: NORMAL MG/DL
ACCEPTIBLE SP GR UR QL: NORMAL
AMPHET UR QL CFM: NEGATIVE NG/ML
BUPRENORPHINE UR QL CFM: NEGATIVE NG/ML
BUTALBITAL UR QL CFM: NEGATIVE NG/ML
BZE UR QL CFM: NEGATIVE NG/ML
CODEINE UR QL CFM: NEGATIVE NG/ML
EDDP UR QL CFM: NEGATIVE NG/ML
ETHYL GLUCURONIDE UR QL CFM: NEGATIVE NG/ML
ETHYL SULFATE UR QL SCN: NEGATIVE NG/ML
EUTYLONE UR QL: NEGATIVE NG/ML
FENTANYL UR QL CFM: NEGATIVE NG/ML
GLIADIN IGG SER IA-ACNC: NEGATIVE NG/ML
HYDROCODONE UR QL CFM: ABNORMAL NG/ML
HYDROMORPHONE UR QL CFM: ABNORMAL NG/ML
LORAZEPAM UR QL CFM: NEGATIVE NG/ML
ME-PHENIDATE UR QL CFM: NEGATIVE NG/ML
MEPERIDINE UR QL CFM: NEGATIVE NG/ML
METHADONE UR QL CFM: NEGATIVE NG/ML
METHAMPHET UR QL CFM: NEGATIVE NG/ML
MORPHINE UR QL CFM: NEGATIVE NG/ML
NALTREXONE UR QL CFM: NEGATIVE NG/ML
NITRITE UR QL: NORMAL UG/ML
NORBUPRENORPHINE UR QL CFM: NEGATIVE NG/ML
NORDIAZEPAM UR QL CFM: NEGATIVE NG/ML
NORFENTANYL UR QL CFM: NEGATIVE NG/ML
NORHYDROCODONE UR QL CFM: ABNORMAL NG/ML
NORMEPERIDINE UR QL CFM: NEGATIVE NG/ML
NOROXYCODONE UR QL CFM: NEGATIVE NG/ML
OXAZEPAM UR QL CFM: NEGATIVE NG/ML
OXYCODONE UR QL CFM: NEGATIVE NG/ML
OXYMORPHONE UR QL CFM: NEGATIVE NG/ML
PARA-FLUOROFENTANYL QUANTIFICATION: NORMAL NG/ML
PHENOBARB UR QL CFM: NEGATIVE NG/ML
RESULT ALL_PRESCRIBED MEDS AND SPECIAL INSTRUCTIONS: NORMAL
SECOBARBITAL UR QL CFM: NEGATIVE NG/ML
SL AMB 5F-ADB-M7 METABOLITE QUANTIFICATION: NEGATIVE NG/ML
SL AMB 7-OH-MITRAGYNINE (KRATOM ALKALOID) QUANTIFICATION: NEGATIVE NG/ML
SL AMB AB-FUBINACA-M3 METABOLITE QUANTIFICATION: NEGATIVE NG/ML
SL AMB ACETYL FENTANYL QUANTIFICATION: NORMAL NG/ML
SL AMB ACETYL NORFENTANYL QUANTIFICATION: NORMAL NG/ML
SL AMB ACRYL FENTANYL QUANTIFICATION: NORMAL NG/ML
SL AMB CARFENTANIL QUANTIFICATION: NORMAL NG/ML
SL AMB CTHC (MARIJUANA METABOLITE) QUANTIFICATION: NEGATIVE NG/ML
SL AMB DEXTRORPHAN (DEXTROMETHORPHAN METABOLITE) QUANT: NEGATIVE NG/ML
SL AMB GABAPENTIN QUANTIFICATION: NEGATIVE NG/ML
SL AMB JWH018 METABOLITE QUANTIFICATION: NEGATIVE NG/ML
SL AMB JWH073 METABOLITE QUANTIFICATION: NEGATIVE NG/ML
SL AMB MDMB-FUBINACA-M1 METABOLITE QUANTIFICATION: NEGATIVE NG/ML
SL AMB METHYLONE QUANTIFICATION: NEGATIVE NG/ML
SL AMB N-DESMETHYL-TRAMADOL QUANTIFICATION: NORMAL NG/ML
SL AMB PHENTERMINE QUANTIFICATION: NEGATIVE NG/ML
SL AMB PREGABALIN QUANTIFICATION: NEGATIVE NG/ML
SL AMB RCS4 METABOLITE QUANTIFICATION: NEGATIVE NG/ML
SL AMB RITALINIC ACID QUANTIFICATION: NEGATIVE NG/ML
SMOOTH MUSCLE AB TITR SER IF: NEGATIVE NG/ML
SPECIMEN DRAWN SERPL: NEGATIVE NG/ML
SPECIMEN PH ACCEPTABLE UR: NORMAL
TAPENTADOL UR QL CFM: NEGATIVE NG/ML
TEMAZEPAM UR QL CFM: NEGATIVE NG/ML
TRAMADOL UR QL CFM: NORMAL NG/ML
URATE/CREAT 24H UR: NORMAL NG/ML
XYLAZINE UR QL CFM: NEGATIVE NG/ML

## 2025-06-02 DIAGNOSIS — T84.498S FAILED ORTHOPEDIC IMPLANT, SEQUELA: ICD-10-CM

## 2025-06-02 RX ORDER — TRAMADOL HYDROCHLORIDE 50 MG/1
50 TABLET ORAL EVERY 8 HOURS PRN
Qty: 30 TABLET | Refills: 0 | Status: SHIPPED | OUTPATIENT
Start: 2025-06-02

## 2025-06-02 NOTE — TELEPHONE ENCOUNTER
Reason for call:   [x] Refill   [] Prior Auth  [] Other:     Office:   [x] PCP/Provider - Yifan Stone   [] Specialty/Provider -     Medication: Tramadol     Dose/Frequency: 50mg every 8 hours PRN    Quantity: 30    Pharmacy: [id number] #[id number]    Local Pharmacy   Does the patient have enough for 3 days?   [] Yes   [x] No - Send as HP to POD

## 2025-06-10 ENCOUNTER — SOCIAL WORK (OUTPATIENT)
Age: 75
End: 2025-06-10

## 2025-06-10 DIAGNOSIS — F43.21 ADJUSTMENT DISORDER WITH DEPRESSED MOOD: Primary | ICD-10-CM

## 2025-06-10 PROCEDURE — 90837 PSYTX W PT 60 MINUTES: CPT | Performed by: SOCIAL WORKER

## 2025-06-10 NOTE — PSYCH
"This note was not shared with the patient due to this is a psychotherapy note    Behavioral Health Psychotherapy Progress Note    Psychotherapy Provided: Individual Psychotherapy     1. Adjustment disorder with depressed mood            Data:   Juan is a 75-year old,  male who presented for a follow-up outpatient individual counseling session after an approximate three-weeks service gap.   For today's session, Juan is concerned about aging and his physical appearance.   He complains about the bags that are under his eyes.   Juan continues to struggle managing his mood secondary to dissatisfaction with the policies of the current presidential administration.   He admits to feeling depressed.   There was no evidence of a thought disorder or psychosis.   Juan denies suicidal ideation, gesture or plan.   The undersigned therapist assisted Juan process his feelings about marital discord.   He says his moral compass tells him not to get .  The undersigned therapist assisted Juan with developing effective mood management strategies by focusing on cognitive restructuring.    During this session, this clinician used the following therapeutic modalities: Cognitive Behavioral Therapy and family systems theory    Substance Abuse was not addressed during this session. If the client is diagnosed with a co-occurring substance use disorder, please indicate any changes in the frequency or amount of use: NA. Stage of change for addressing substance use diagnoses: No substance use/Not applicable    ASSESSMENT:  Juan Vargas presents with a Depressed mood.    His affect is Normal range and intensity, which is congruent, with his mood and the content of the session. The client has not made progress on their goals.     Juan Vargas presents with a none risk of suicide, none risk of self-harm, and none risk of harm to others.    For any risk assessment that surpasses a \"low\" rating, a safety plan must be developed.    A " safety plan was indicated: no  If yes, describe in detail:  Juan's safety plan was discussed and she agreed that if he experiences unsafe thoughts that she will reach out to their supports including this office, the suicide hotline, and emergency services, if necessary. Juan is aware of non-emergent and emergent mental health resources.      Depression Follow-up Plan Completed: No    Visit start and stop times:    06/10/25  Start Time: 0809  End Time:  9:02 AM

## 2025-06-26 ENCOUNTER — SOCIAL WORK (OUTPATIENT)
Age: 75
End: 2025-06-26

## 2025-06-26 DIAGNOSIS — F43.21 ADJUSTMENT DISORDER WITH DEPRESSED MOOD: Primary | ICD-10-CM

## 2025-06-26 PROCEDURE — 90837 PSYTX W PT 60 MINUTES: CPT | Performed by: SOCIAL WORKER

## 2025-06-30 DIAGNOSIS — G47.9 SLEEP DISORDER: ICD-10-CM

## 2025-06-30 DIAGNOSIS — T84.498S FAILED ORTHOPEDIC IMPLANT, SEQUELA: ICD-10-CM

## 2025-06-30 NOTE — TELEPHONE ENCOUNTER
Medication Refill Request       Medication:   traMADol (ULTRAM) 50 mg tablet      Dose/Frequency: malik 1 tablet (50 mg total) by mouth every 8 (eight) hours as needed for moderate pain    Quantity:        30 tablet (10 day supply)                Pharmacy:   [id number] [address] [address]  Phone: [phone]  Fax: [phone]       Office:   [x] PCP/Provider -   [] Specialty/Provider -     Does the patient have enough for 3 days?   [x] Yes   [] No - Send as HP to POD    Is the patient completely out of the medication or does not have enough until the next business day?  [] Yes - send to [facility] Hub  [x] No - Send as HP to POD        Medication Refill Request       Medication:   zolpidem (AMBIEN) 10 mg tablet      Dose/Frequency: Take 1 tablet (10 mg total) by mouth daily at bedtime as needed for sleep 1/2 to 1 PRN     Quantity: 30 tablet (30 day supply)     Pharmacy:   [facility] #[id number] [address] [address]  Phone: [phone]  Fax: [phone]       Office:   [x] PCP/Provider -   [] Specialty/Provider -     Does the patient have enough for 3 days?   [x] Yes   [] No - Send as HP to POD    Is the patient completely out of the medication or does not have enough until the next business day?  [] Yes - send to [facility] Hub  [x] No - Send as HP to POD

## 2025-07-01 RX ORDER — TRAMADOL HYDROCHLORIDE 50 MG/1
50 TABLET ORAL EVERY 8 HOURS PRN
Qty: 30 TABLET | Refills: 0 | Status: SHIPPED | OUTPATIENT
Start: 2025-07-01

## 2025-07-01 RX ORDER — ZOLPIDEM TARTRATE 10 MG/1
10 TABLET ORAL
Qty: 30 TABLET | Refills: 0 | Status: SHIPPED | OUTPATIENT
Start: 2025-07-01

## 2025-07-08 ENCOUNTER — SOCIAL WORK (OUTPATIENT)
Age: 75
End: 2025-07-08

## 2025-07-08 DIAGNOSIS — F43.21 ADJUSTMENT DISORDER WITH DEPRESSED MOOD: Primary | ICD-10-CM

## 2025-07-08 PROCEDURE — 90837 PSYTX W PT 60 MINUTES: CPT | Performed by: SOCIAL WORKER

## 2025-07-08 NOTE — PSYCH
This note was not shared with the patient due to this is a psychotherapy note    Behavioral Health Psychotherapy Progress Note    Psychotherapy Provided: Individual Psychotherapy     1. Adjustment disorder with depressed mood            DATA:   Juan is a 75-year old,  male who presented for a follow-up outpatient individual counseling session after an approximate two-weeks service gap.   For today's session with the undersigned therapist, Juan is complaining about back, knee, and neck pain.   He is tired of the pain.   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   The undersigned therapist suggested Juan consider cutting back on some of his more laborious work.  Juan is struggling to cope with stressors secondary to marital discord and relationship conflict with his adopted daughter.   He feels disrespected and lacks control because his wife always sides with his daughter.   Juan has felt increasingly depressed and questions the purpose of life.   There was no evidence of a thought disorder or psychosis.   Juan denied suicidal ideation, gesture or plan.  Juan indicated he has considered placing his daughter in an adult group home but she refuses consent.  He refuses to put her on the street.   The undersigned therapist assisted Juan develop effective mood management strategies by Identifying and challenging cognitive distortions, practicing reframing negative thoughts, and developing and implementing positive self-talk strategies.  The undersigned therapist assisted Juan with reaching a level of reduced tension, increased satisfaction, and improved communication with family members.      During this session, this clinician used the following therapeutic modalities: Cognitive Behavioral Therapy and family systems theory    Substance Abuse was not addressed during this session. If the client is diagnosed with a co-occurring substance use disorder,  "please indicate any changes in the frequency or amount of use: NA. Stage of change for addressing substance use diagnoses: No substance use/Not applicable    ASSESSMENT:  Jaun Vargas presents with a Depressed and stressed mood.    His affect is Normal range and intensity, which is congruent, with his mood and the content of the session. The client has made progress on their goals.    Juan Vargas presents with a none risk of suicide, none risk of self-harm, and none risk of harm to others.    For any risk assessment that surpasses a \"low\" rating, a safety plan must be developed.    A safety plan was indicated: no  If yes, describe in detail:  Juan's safety plan was discussed and she agreed that if she experiences unsafe thoughts that he will reach out to their supports including this office, the suicide hotline, and emergency services, if necessary. Juan is aware of non-emergent and emergent mental health resources.      Depression Follow-up Plan Completed: No    Visit start and stop times:    07/08/25  Start Time: 0805  End Time:  9:00 AM  "

## 2025-07-15 ENCOUNTER — SOCIAL WORK (OUTPATIENT)
Age: 75
End: 2025-07-15

## 2025-07-15 DIAGNOSIS — F43.21 ADJUSTMENT DISORDER WITH DEPRESSED MOOD: Primary | ICD-10-CM

## 2025-07-15 PROCEDURE — 90837 PSYTX W PT 60 MINUTES: CPT | Performed by: SOCIAL WORKER

## 2025-07-22 ENCOUNTER — SOCIAL WORK (OUTPATIENT)
Age: 75
End: 2025-07-22

## 2025-07-31 DIAGNOSIS — R35.1 BENIGN PROSTATIC HYPERPLASIA WITH NOCTURIA: ICD-10-CM

## 2025-07-31 DIAGNOSIS — N40.1 BENIGN PROSTATIC HYPERPLASIA WITH NOCTURIA: ICD-10-CM

## 2025-07-31 DIAGNOSIS — T84.498S FAILED ORTHOPEDIC IMPLANT, SEQUELA: ICD-10-CM

## 2025-07-31 RX ORDER — TRAMADOL HYDROCHLORIDE 50 MG/1
50 TABLET ORAL EVERY 8 HOURS PRN
Qty: 30 TABLET | Refills: 0 | Status: SHIPPED | OUTPATIENT
Start: 2025-07-31

## 2025-08-01 RX ORDER — ALFUZOSIN HYDROCHLORIDE 10 MG/1
10 TABLET, EXTENDED RELEASE ORAL DAILY
Qty: 90 TABLET | Refills: 1 | Status: SHIPPED | OUTPATIENT
Start: 2025-08-01

## 2025-08-14 ENCOUNTER — TELEPHONE (OUTPATIENT)
Dept: ADMINISTRATIVE | Facility: OTHER | Age: 75
End: 2025-08-14

## 2025-08-18 ENCOUNTER — OFFICE VISIT (OUTPATIENT)
Dept: FAMILY MEDICINE CLINIC | Facility: CLINIC | Age: 75
End: 2025-08-18
Payer: MEDICARE

## 2025-08-18 VITALS
SYSTOLIC BLOOD PRESSURE: 130 MMHG | RESPIRATION RATE: 16 BRPM | DIASTOLIC BLOOD PRESSURE: 70 MMHG | TEMPERATURE: 97.5 F | HEIGHT: 67 IN | WEIGHT: 152.2 LBS | OXYGEN SATURATION: 98 % | HEART RATE: 74 BPM | BODY MASS INDEX: 23.89 KG/M2

## 2025-08-18 DIAGNOSIS — M54.50 CHRONIC MIDLINE LOW BACK PAIN WITHOUT SCIATICA: Primary | ICD-10-CM

## 2025-08-18 DIAGNOSIS — F11.20 CONTINUOUS OPIOID DEPENDENCE (HCC): ICD-10-CM

## 2025-08-18 DIAGNOSIS — G89.4 CHRONIC PAIN SYNDROME: ICD-10-CM

## 2025-08-18 DIAGNOSIS — G89.29 CHRONIC MIDLINE LOW BACK PAIN WITHOUT SCIATICA: Primary | ICD-10-CM

## 2025-08-18 DIAGNOSIS — T84.498S FAILED ORTHOPEDIC IMPLANT, SEQUELA: ICD-10-CM

## 2025-08-18 PROCEDURE — 96372 THER/PROPH/DIAG INJ SC/IM: CPT

## 2025-08-18 PROCEDURE — 99214 OFFICE O/P EST MOD 30 MIN: CPT | Performed by: FAMILY MEDICINE

## 2025-08-18 RX ORDER — METHYLPREDNISOLONE ACETATE 40 MG/ML
40 INJECTION, SUSPENSION INTRA-ARTICULAR; INTRALESIONAL; INTRAMUSCULAR; SOFT TISSUE ONCE
Status: COMPLETED | OUTPATIENT
Start: 2025-08-18 | End: 2025-08-18

## 2025-08-18 RX ADMIN — METHYLPREDNISOLONE ACETATE 40 MG: 40 INJECTION, SUSPENSION INTRA-ARTICULAR; INTRALESIONAL; INTRAMUSCULAR; SOFT TISSUE at 09:42
